# Patient Record
Sex: MALE | Race: BLACK OR AFRICAN AMERICAN | Employment: UNEMPLOYED | URBAN - METROPOLITAN AREA
[De-identification: names, ages, dates, MRNs, and addresses within clinical notes are randomized per-mention and may not be internally consistent; named-entity substitution may affect disease eponyms.]

---

## 2018-05-08 ENCOUNTER — APPOINTMENT (OUTPATIENT)
Dept: GENERAL RADIOLOGY | Age: 68
DRG: 871 | End: 2018-05-08
Attending: INTERNAL MEDICINE
Payer: MEDICARE

## 2018-05-08 ENCOUNTER — ANESTHESIA (OUTPATIENT)
Dept: ICU | Age: 68
DRG: 871 | End: 2018-05-08
Payer: MEDICARE

## 2018-05-08 ENCOUNTER — APPOINTMENT (OUTPATIENT)
Dept: MRI IMAGING | Age: 68
DRG: 871 | End: 2018-05-08
Attending: EMERGENCY MEDICINE
Payer: MEDICARE

## 2018-05-08 ENCOUNTER — APPOINTMENT (OUTPATIENT)
Dept: GENERAL RADIOLOGY | Age: 68
DRG: 871 | End: 2018-05-08
Attending: EMERGENCY MEDICINE
Payer: MEDICARE

## 2018-05-08 ENCOUNTER — HOSPITAL ENCOUNTER (INPATIENT)
Age: 68
LOS: 8 days | Discharge: NURSING, REHAB OR LONG-TERM CARE | DRG: 871 | End: 2018-05-16
Attending: EMERGENCY MEDICINE | Admitting: FAMILY MEDICINE
Payer: MEDICARE

## 2018-05-08 ENCOUNTER — APPOINTMENT (OUTPATIENT)
Dept: CT IMAGING | Age: 68
DRG: 871 | End: 2018-05-08
Attending: EMERGENCY MEDICINE
Payer: MEDICARE

## 2018-05-08 ENCOUNTER — ANESTHESIA EVENT (OUTPATIENT)
Dept: ICU | Age: 68
DRG: 871 | End: 2018-05-08
Payer: MEDICARE

## 2018-05-08 DIAGNOSIS — N17.9 ACUTE RENAL FAILURE, UNSPECIFIED ACUTE RENAL FAILURE TYPE (HCC): ICD-10-CM

## 2018-05-08 DIAGNOSIS — I63.9 ACUTE CVA (CEREBROVASCULAR ACCIDENT) (HCC): Primary | ICD-10-CM

## 2018-05-08 DIAGNOSIS — T68.XXXA HYPOTHERMIA, INITIAL ENCOUNTER: ICD-10-CM

## 2018-05-08 DIAGNOSIS — N19 UREMIA: ICD-10-CM

## 2018-05-08 DIAGNOSIS — J96.01 ACUTE RESPIRATORY FAILURE WITH HYPOXIA (HCC): ICD-10-CM

## 2018-05-08 DIAGNOSIS — E87.1 HYPONATREMIA: ICD-10-CM

## 2018-05-08 PROBLEM — J96.90 RESPIRATORY FAILURE (HCC): Status: ACTIVE | Noted: 2018-05-08

## 2018-05-08 PROBLEM — E87.20 LACTIC ACIDOSIS: Status: ACTIVE | Noted: 2018-05-08

## 2018-05-08 PROBLEM — E86.0 DEHYDRATION: Status: ACTIVE | Noted: 2018-05-08

## 2018-05-08 PROBLEM — G93.40 ENCEPHALOPATHY: Status: ACTIVE | Noted: 2018-05-08

## 2018-05-08 PROBLEM — E87.0 HYPERNATREMIA: Status: ACTIVE | Noted: 2018-05-08

## 2018-05-08 LAB
ABO + RH BLD: NORMAL
ALBUMIN SERPL-MCNC: 1.5 G/DL (ref 3.4–5)
ALBUMIN/GLOB SERPL: 0.3 {RATIO} (ref 0.8–1.7)
ALP SERPL-CCNC: 104 U/L (ref 45–117)
ALT SERPL-CCNC: 42 U/L (ref 16–61)
AMPHET UR QL SCN: NEGATIVE
ANION GAP BLD CALC-SCNC: 18 MMOL/L (ref 10–20)
ANION GAP SERPL CALC-SCNC: 7 MMOL/L (ref 3–18)
APPEARANCE UR: CLEAR
ARTERIAL PATENCY WRIST A: ABNORMAL
ASPIRIN TEST, ASPIRN: 415 ARU (ref 620–672)
AST SERPL-CCNC: 72 U/L (ref 15–37)
BACTERIA URNS QL MICRO: NEGATIVE /HPF
BARBITURATES UR QL SCN: NEGATIVE
BASE DEFICIT BLD-SCNC: 3 MMOL/L
BDY SITE: ABNORMAL
BENZODIAZ UR QL: NEGATIVE
BILIRUB SERPL-MCNC: 0.6 MG/DL (ref 0.2–1)
BILIRUB UR QL: ABNORMAL
BLOOD GROUP ANTIBODIES SERPL: NORMAL
BODY TEMPERATURE: 94.7
BUN BLD-MCNC: 101 MG/DL (ref 7–18)
BUN SERPL-MCNC: 105 MG/DL (ref 7–18)
BUN/CREAT SERPL: 29 (ref 12–20)
CA-I BLD-MCNC: 1.2 MMOL/L (ref 1.12–1.32)
CALCIUM SERPL-MCNC: 8.5 MG/DL (ref 8.5–10.1)
CANNABINOIDS UR QL SCN: NEGATIVE
CHLORIDE BLD-SCNC: 121 MMOL/L (ref 100–108)
CHLORIDE SERPL-SCNC: 125 MMOL/L (ref 100–108)
CO2 BLD-SCNC: 25 MMOL/L (ref 19–24)
CO2 SERPL-SCNC: 26 MMOL/L (ref 21–32)
COCAINE UR QL SCN: NEGATIVE
COLOR UR: ABNORMAL
CREAT SERPL-MCNC: 3.56 MG/DL (ref 0.6–1.3)
CREAT UR-MCNC: 3.3 MG/DL (ref 0.6–1.3)
EPITH CASTS URNS QL MICRO: NORMAL /LPF (ref 0–5)
ERYTHROCYTE [DISTWIDTH] IN BLOOD BY AUTOMATED COUNT: 15.3 % (ref 11.6–14.5)
FIBRINOGEN PPP-MCNC: 441 MG/DL (ref 210–451)
GAS FLOW.O2 O2 DELIVERY SYS: ABNORMAL L/MIN
GAS FLOW.O2 SETTING OXYMISER: 15 BPM
GLOBULIN SER CALC-MCNC: 5.3 G/DL (ref 2–4)
GLUCOSE BLD STRIP.AUTO-MCNC: 180 MG/DL (ref 74–106)
GLUCOSE SERPL-MCNC: 177 MG/DL (ref 74–99)
GLUCOSE UR STRIP.AUTO-MCNC: NEGATIVE MG/DL
HCO3 BLD-SCNC: 22.5 MMOL/L (ref 22–26)
HCT VFR BLD AUTO: 37 % (ref 36–48)
HCT VFR BLD CALC: 39 % (ref 36–49)
HDSCOM,HDSCOM: NORMAL
HGB BLD-MCNC: 11.7 G/DL (ref 13–16)
HGB BLD-MCNC: 13.3 G/DL (ref 12–16)
HGB UR QL STRIP: NEGATIVE
INR PPP: 1.2 (ref 0.8–1.2)
INSPIRATION.DURATION SETTING TIME VENT: 0.9 SEC
KETONES UR QL STRIP.AUTO: NEGATIVE MG/DL
LACTATE BLD-SCNC: 3.1 MMOL/L (ref 0.4–2)
LEUKOCYTE ESTERASE UR QL STRIP.AUTO: ABNORMAL
MCH RBC QN AUTO: 28.5 PG (ref 24–34)
MCHC RBC AUTO-ENTMCNC: 31.6 G/DL (ref 31–37)
MCV RBC AUTO: 90.2 FL (ref 74–97)
METHADONE UR QL: NEGATIVE
NITRITE UR QL STRIP.AUTO: NEGATIVE
O2/TOTAL GAS SETTING VFR VENT: 0.5 %
OPIATES UR QL: NEGATIVE
PCO2 BLD: 35.2 MMHG (ref 35–45)
PCP UR QL: NEGATIVE
PEEP RESPIRATORY: 7 CMH2O
PH BLD: 7.41 [PH] (ref 7.35–7.45)
PH UR STRIP: 5 [PH] (ref 5–8)
PIP ISTAT,IPIP: 21
PLATELET # BLD AUTO: 133 K/UL (ref 135–420)
PMV BLD AUTO: 11.1 FL (ref 9.2–11.8)
PO2 BLD: 112 MMHG (ref 80–100)
POTASSIUM BLD-SCNC: 4.7 MMOL/L (ref 3.5–5.5)
POTASSIUM SERPL-SCNC: 4.7 MMOL/L (ref 3.5–5.5)
PROT SERPL-MCNC: 6.8 G/DL (ref 6.4–8.2)
PROT UR STRIP-MCNC: 30 MG/DL
PROTHROMBIN TIME: 14.7 SEC (ref 11.5–15.2)
RBC # BLD AUTO: 4.1 M/UL (ref 4.7–5.5)
RBC #/AREA URNS HPF: 0 /HPF (ref 0–5)
SAO2 % BLD: 99 % (ref 92–97)
SERVICE CMNT-IMP: ABNORMAL
SODIUM BLD-SCNC: 159 MMOL/L (ref 136–145)
SODIUM SERPL-SCNC: 158 MMOL/L (ref 136–145)
SP GR UR REFRACTOMETRY: 1.02 (ref 1–1.03)
SPECIMEN EXP DATE BLD: NORMAL
SPECIMEN TYPE: ABNORMAL
THROMBIN TIME: 18.5 SECS (ref 13.8–18.2)
TOTAL RESP. RATE, ITRR: 15
UROBILINOGEN UR QL STRIP.AUTO: 1 EU/DL (ref 0.2–1)
VENTILATION MODE VENT: ABNORMAL
VOLUME CONTROL PLUS IVLCP: YES
VT SETTING VENT: 450 ML
WBC # BLD AUTO: 12.7 K/UL (ref 4.6–13.2)
WBC URNS QL MICRO: NORMAL /HPF (ref 0–4)

## 2018-05-08 PROCEDURE — 74011250637 HC RX REV CODE- 250/637: Performed by: EMERGENCY MEDICINE

## 2018-05-08 PROCEDURE — 05HN33Z INSERTION OF INFUSION DEVICE INTO LEFT INTERNAL JUGULAR VEIN, PERCUTANEOUS APPROACH: ICD-10-PCS | Performed by: FAMILY MEDICINE

## 2018-05-08 PROCEDURE — 83605 ASSAY OF LACTIC ACID: CPT

## 2018-05-08 PROCEDURE — 74011000258 HC RX REV CODE- 258: Performed by: FAMILY MEDICINE

## 2018-05-08 PROCEDURE — 80053 COMPREHEN METABOLIC PANEL: CPT | Performed by: EMERGENCY MEDICINE

## 2018-05-08 PROCEDURE — 80047 BASIC METABLC PNL IONIZED CA: CPT

## 2018-05-08 PROCEDURE — 81001 URINALYSIS AUTO W/SCOPE: CPT | Performed by: EMERGENCY MEDICINE

## 2018-05-08 PROCEDURE — 85027 COMPLETE CBC AUTOMATED: CPT | Performed by: EMERGENCY MEDICINE

## 2018-05-08 PROCEDURE — 85384 FIBRINOGEN ACTIVITY: CPT | Performed by: EMERGENCY MEDICINE

## 2018-05-08 PROCEDURE — 74011250636 HC RX REV CODE- 250/636: Performed by: INTERNAL MEDICINE

## 2018-05-08 PROCEDURE — 36415 COLL VENOUS BLD VENIPUNCTURE: CPT | Performed by: FAMILY MEDICINE

## 2018-05-08 PROCEDURE — 87077 CULTURE AEROBIC IDENTIFY: CPT | Performed by: EMERGENCY MEDICINE

## 2018-05-08 PROCEDURE — 77030008683 HC TU ET CUF COVD -A

## 2018-05-08 PROCEDURE — 94002 VENT MGMT INPAT INIT DAY: CPT

## 2018-05-08 PROCEDURE — 5A1945Z RESPIRATORY VENTILATION, 24-96 CONSECUTIVE HOURS: ICD-10-PCS | Performed by: ANESTHESIOLOGY

## 2018-05-08 PROCEDURE — 70450 CT HEAD/BRAIN W/O DYE: CPT

## 2018-05-08 PROCEDURE — 0BH17EZ INSERTION OF ENDOTRACHEAL AIRWAY INTO TRACHEA, VIA NATURAL OR ARTIFICIAL OPENING: ICD-10-PCS | Performed by: ANESTHESIOLOGY

## 2018-05-08 PROCEDURE — 74011250636 HC RX REV CODE- 250/636: Performed by: FAMILY MEDICINE

## 2018-05-08 PROCEDURE — 31500 INSERT EMERGENCY AIRWAY: CPT

## 2018-05-08 PROCEDURE — 51702 INSERT TEMP BLADDER CATH: CPT

## 2018-05-08 PROCEDURE — 82803 BLOOD GASES ANY COMBINATION: CPT

## 2018-05-08 PROCEDURE — 65610000006 HC RM INTENSIVE CARE

## 2018-05-08 PROCEDURE — 82550 ASSAY OF CK (CPK): CPT | Performed by: INTERNAL MEDICINE

## 2018-05-08 PROCEDURE — 99285 EMERGENCY DEPT VISIT HI MDM: CPT

## 2018-05-08 PROCEDURE — 96368 THER/DIAG CONCURRENT INF: CPT

## 2018-05-08 PROCEDURE — 96365 THER/PROPH/DIAG IV INF INIT: CPT

## 2018-05-08 PROCEDURE — 77030013079 HC BLNKT BAIR HGGR 3M -A

## 2018-05-08 PROCEDURE — 71045 X-RAY EXAM CHEST 1 VIEW: CPT

## 2018-05-08 PROCEDURE — 74011000250 HC RX REV CODE- 250: Performed by: INTERNAL MEDICINE

## 2018-05-08 PROCEDURE — C1751 CATH, INF, PER/CENT/MIDLINE: HCPCS | Performed by: ANESTHESIOLOGY

## 2018-05-08 PROCEDURE — 84443 ASSAY THYROID STIM HORMONE: CPT | Performed by: FAMILY MEDICINE

## 2018-05-08 PROCEDURE — 85610 PROTHROMBIN TIME: CPT | Performed by: EMERGENCY MEDICINE

## 2018-05-08 PROCEDURE — 94762 N-INVAS EAR/PLS OXIMTRY CONT: CPT

## 2018-05-08 PROCEDURE — 93005 ELECTROCARDIOGRAM TRACING: CPT

## 2018-05-08 PROCEDURE — 65610000009 HC RM ICU NEURO

## 2018-05-08 PROCEDURE — 36600 WITHDRAWAL OF ARTERIAL BLOOD: CPT

## 2018-05-08 PROCEDURE — 87040 BLOOD CULTURE FOR BACTERIA: CPT | Performed by: EMERGENCY MEDICINE

## 2018-05-08 PROCEDURE — 84439 ASSAY OF FREE THYROXINE: CPT | Performed by: FAMILY MEDICINE

## 2018-05-08 PROCEDURE — 85576 BLOOD PLATELET AGGREGATION: CPT | Performed by: EMERGENCY MEDICINE

## 2018-05-08 PROCEDURE — 80307 DRUG TEST PRSMV CHEM ANLYZR: CPT | Performed by: EMERGENCY MEDICINE

## 2018-05-08 PROCEDURE — 86900 BLOOD TYPING SEROLOGIC ABO: CPT | Performed by: EMERGENCY MEDICINE

## 2018-05-08 PROCEDURE — 83605 ASSAY OF LACTIC ACID: CPT | Performed by: FAMILY MEDICINE

## 2018-05-08 PROCEDURE — 85670 THROMBIN TIME PLASMA: CPT | Performed by: EMERGENCY MEDICINE

## 2018-05-08 PROCEDURE — 87186 SC STD MICRODIL/AGAR DIL: CPT | Performed by: EMERGENCY MEDICINE

## 2018-05-08 PROCEDURE — 74011250636 HC RX REV CODE- 250/636: Performed by: EMERGENCY MEDICINE

## 2018-05-08 PROCEDURE — 83735 ASSAY OF MAGNESIUM: CPT | Performed by: FAMILY MEDICINE

## 2018-05-08 PROCEDURE — 74011000258 HC RX REV CODE- 258: Performed by: INTERNAL MEDICINE

## 2018-05-08 PROCEDURE — 74011000258 HC RX REV CODE- 258: Performed by: EMERGENCY MEDICINE

## 2018-05-08 PROCEDURE — 77030005534 HC CATH URETH FOL TEMP SENS SIMS -B

## 2018-05-08 RX ORDER — FENTANYL CITRATE 50 UG/ML
50 INJECTION, SOLUTION INTRAMUSCULAR; INTRAVENOUS
Status: DISCONTINUED | OUTPATIENT
Start: 2018-05-08 | End: 2018-05-16 | Stop reason: HOSPADM

## 2018-05-08 RX ORDER — DEXTROSE MONOHYDRATE AND SODIUM CHLORIDE 5; .45 G/100ML; G/100ML
125 INJECTION, SOLUTION INTRAVENOUS CONTINUOUS
Status: DISCONTINUED | OUTPATIENT
Start: 2018-05-08 | End: 2018-05-09

## 2018-05-08 RX ORDER — HYDROCORTISONE SODIUM SUCCINATE 100 MG/2ML
50 INJECTION, POWDER, FOR SOLUTION INTRAMUSCULAR; INTRAVENOUS EVERY 6 HOURS
Status: DISCONTINUED | OUTPATIENT
Start: 2018-05-08 | End: 2018-05-10

## 2018-05-08 RX ORDER — ASPIRIN 300 MG/1
300 SUPPOSITORY RECTAL DAILY
Status: DISCONTINUED | OUTPATIENT
Start: 2018-05-09 | End: 2018-05-11

## 2018-05-08 RX ORDER — SODIUM CHLORIDE 9 MG/ML
125 INJECTION, SOLUTION INTRAVENOUS CONTINUOUS
Status: DISCONTINUED | OUTPATIENT
Start: 2018-05-08 | End: 2018-05-08

## 2018-05-08 RX ORDER — SODIUM CHLORIDE 9 MG/ML
1000 INJECTION, SOLUTION INTRAVENOUS ONCE
Status: COMPLETED | OUTPATIENT
Start: 2018-05-08 | End: 2018-05-08

## 2018-05-08 RX ORDER — NOREPINEPHRINE BIT/0.9 % NACL 8 MG/250ML
2-16 INFUSION BOTTLE (ML) INTRAVENOUS
Status: DISCONTINUED | OUTPATIENT
Start: 2018-05-08 | End: 2018-05-08

## 2018-05-08 RX ORDER — MIDAZOLAM HYDROCHLORIDE 1 MG/ML
1 INJECTION, SOLUTION INTRAMUSCULAR; INTRAVENOUS
Status: DISCONTINUED | OUTPATIENT
Start: 2018-05-08 | End: 2018-05-13

## 2018-05-08 RX ORDER — SODIUM CHLORIDE 9 MG/ML
1000 INJECTION, SOLUTION INTRAVENOUS ONCE
Status: COMPLETED | OUTPATIENT
Start: 2018-05-08 | End: 2018-05-12

## 2018-05-08 RX ORDER — NOREPINEPHRINE BIT/0.9 % NACL 8 MG/250ML
2-30 INFUSION BOTTLE (ML) INTRAVENOUS
Status: DISCONTINUED | OUTPATIENT
Start: 2018-05-08 | End: 2018-05-10

## 2018-05-08 RX ORDER — SODIUM CHLORIDE 0.9 % (FLUSH) 0.9 %
5-10 SYRINGE (ML) INJECTION AS NEEDED
Status: DISCONTINUED | OUTPATIENT
Start: 2018-05-08 | End: 2018-05-16 | Stop reason: HOSPADM

## 2018-05-08 RX ORDER — HEPARIN SODIUM 5000 [USP'U]/ML
5000 INJECTION, SOLUTION INTRAVENOUS; SUBCUTANEOUS EVERY 8 HOURS
Status: DISCONTINUED | OUTPATIENT
Start: 2018-05-08 | End: 2018-05-14

## 2018-05-08 RX ORDER — ASPIRIN 300 MG/1
300 SUPPOSITORY RECTAL
Status: COMPLETED | OUTPATIENT
Start: 2018-05-08 | End: 2018-05-08

## 2018-05-08 RX ORDER — SODIUM CHLORIDE 0.9 % (FLUSH) 0.9 %
5-10 SYRINGE (ML) INJECTION EVERY 8 HOURS
Status: DISCONTINUED | OUTPATIENT
Start: 2018-05-08 | End: 2018-05-16 | Stop reason: HOSPADM

## 2018-05-08 RX ORDER — NOREPINEPHRINE BIT/0.9 % NACL 8 MG/250ML
INFUSION BOTTLE (ML) INTRAVENOUS
Status: DISPENSED
Start: 2018-05-08 | End: 2018-05-09

## 2018-05-08 RX ORDER — LEVOFLOXACIN 5 MG/ML
750 INJECTION, SOLUTION INTRAVENOUS
Status: DISCONTINUED | OUTPATIENT
Start: 2018-05-08 | End: 2018-05-10

## 2018-05-08 RX ORDER — VASOPRESSIN 20 U/ML
INJECTION PARENTERAL
Status: DISPENSED
Start: 2018-05-08 | End: 2018-05-09

## 2018-05-08 RX ORDER — PHENYLEPHRINE HCL IN 0.9% NACL 30MG/250ML
10-300 PLASTIC BAG, INJECTION (ML) INTRAVENOUS
Status: DISCONTINUED | OUTPATIENT
Start: 2018-05-08 | End: 2018-05-10

## 2018-05-08 RX ADMIN — SODIUM CHLORIDE 1000 ML: 900 INJECTION, SOLUTION INTRAVENOUS at 19:11

## 2018-05-08 RX ADMIN — NOREPINEPHRINE BITARTRATE 30 MCG/MIN: 1 INJECTION INTRAVENOUS at 23:34

## 2018-05-08 RX ADMIN — HEPARIN SODIUM 5000 UNITS: 5000 INJECTION, SOLUTION INTRAVENOUS; SUBCUTANEOUS at 21:07

## 2018-05-08 RX ADMIN — Medication 10 ML: at 21:01

## 2018-05-08 RX ADMIN — DEXTROSE MONOHYDRATE AND SODIUM CHLORIDE 125 ML/HR: 5; .45 INJECTION, SOLUTION INTRAVENOUS at 21:01

## 2018-05-08 RX ADMIN — LEVOFLOXACIN 750 MG: 5 INJECTION, SOLUTION INTRAVENOUS at 15:47

## 2018-05-08 RX ADMIN — ASPIRIN 300 MG: 300 SUPPOSITORY RECTAL at 15:33

## 2018-05-08 RX ADMIN — SODIUM CHLORIDE 1000 ML: 900 INJECTION, SOLUTION INTRAVENOUS at 23:20

## 2018-05-08 RX ADMIN — NOREPINEPHRINE BITARTRATE 30 MCG/MIN: 1 INJECTION INTRAVENOUS at 23:23

## 2018-05-08 RX ADMIN — Medication 30 MCG/MIN: at 19:48

## 2018-05-08 RX ADMIN — HYDROCORTISONE SODIUM SUCCINATE 50 MG: 100 INJECTION, POWDER, FOR SOLUTION INTRAMUSCULAR; INTRAVENOUS at 23:19

## 2018-05-08 RX ADMIN — NOREPINEPHRINE BITARTRATE 30 MCG/MIN: 1 INJECTION INTRAVENOUS at 23:50

## 2018-05-08 RX ADMIN — PIPERACILLIN SODIUM,TAZOBACTAM SODIUM 3.38 G: 3; .375 INJECTION, POWDER, FOR SOLUTION INTRAVENOUS at 21:08

## 2018-05-08 RX ADMIN — SODIUM CHLORIDE 1000 MG: 900 INJECTION, SOLUTION INTRAVENOUS at 15:46

## 2018-05-08 RX ADMIN — FAMOTIDINE 20 MG: 10 INJECTION INTRAVENOUS at 21:07

## 2018-05-08 RX ADMIN — SODIUM CHLORIDE 1000 ML: 900 INJECTION, SOLUTION INTRAVENOUS at 14:54

## 2018-05-08 RX ADMIN — SODIUM CHLORIDE 2178 ML: 900 INJECTION, SOLUTION INTRAVENOUS at 15:15

## 2018-05-08 RX ADMIN — SODIUM CHLORIDE 750 MG: 900 INJECTION, SOLUTION INTRAVENOUS at 17:38

## 2018-05-08 RX ADMIN — VASOPRESSIN 0.04 UNITS/MIN: 20 INJECTION INTRAVENOUS at 20:37

## 2018-05-08 RX ADMIN — PIPERACILLIN SODIUM,TAZOBACTAM SODIUM 3.38 G: 3; .375 INJECTION, POWDER, FOR SOLUTION INTRAVENOUS at 15:36

## 2018-05-08 NOTE — ED NOTES
Initial delay in transfer related to needing family member for MRI screening. After finding family member, patient was noted to have low systolic BP. Awaited return call per hospitalist for orders. Bolus administered. Patient has been positively responsive to fluids. Will transfer to MRI then to ICU after that. Patient remains intubated with no new issues noted. Will continue to monitor.

## 2018-05-08 NOTE — IP AVS SNAPSHOT
[address] [address] 
[phone] Patient: Eli Tan MRN: [id number] TMD:0/8/5631 About your hospitalization You were admitted on: May 8, 2018 You last received care in the:  [address] NEURO [facility] You were discharged on:  May 16, 2018 Why you were hospitalized Your primary diagnosis was:  Encephalopathy Your diagnoses also included:  Respiratory Failure (Hcc), Dehydration, Lactic Acidosis, Hypernatremia, Rohan (Acute Kidney Injury) (Hcc), Cva (Cerebral Vascular Accident) (Hcc), Sepsis (Hcc), Elevated Troponin, Postprocedural Pneumothorax, Hyperglycemia, Elevated Ck, Bacteremia, Advance Care Planning, Altered Mental Status, Peg (Percutaneous Endoscopic Gastrostomy) Status (Hcc), Debility, Incontinence Of Feces, Malnutrition (Hcc), Dependent For Personal Hygiene, Palliative Care Encounter Follow-up Information Follow up With Details Comments Contact Info Chacho Mayorgamyriam ([facility])  on arrival  [address] [facility] [phone] [id number] [phone] Rosa Ernst MD In 1 week Reveiw recent hospitalization [phone] Discharge Orders None A check familia indicates which time of day the medication should be taken. My Medications START taking these medications Instructions Each Dose to Equal  
 Morning Noon Evening Bedtime  
 amLODIPine 10 mg tablet Commonly known as:  Caralee Dupes Start taking on:  5/17/2018 Your last dose was: Your next dose is: Take 1 Tab by mouth daily. 10 mg  
    
   
   
   
  
 apixaban 5 mg tablet Commonly known as:  Shreya Foster Your last dose was: Your next dose is: Take 10 mg BID x 7 days then 5 mg BID  
     
   
   
   
  
 aspirin 325 mg tablet Commonly known as:  ASPIRIN Start taking on:  5/17/2018 Your last dose was: Your next dose is: Take 1 Tab by mouth daily. 325 mg  
    
   
   
   
  
 collagenase 250 unit/gram ointment Commonly known as:  SANTYL Start taking on:  5/17/2018 Your last dose was: Your next dose is:    
   
   
 Apply  to affected area daily. predniSONE 20 mg tablet Commonly known as:  Emely Burgos Your last dose was: Your next dose is: Take 2 Tabs by mouth daily (with breakfast) for 5 days. 40 mg Where to Get Your Medications Information on where to get these meds will be given to you by the nurse or doctor. ! Ask your nurse or doctor about these medications  
  amLODIPine 10 mg tablet  
 apixaban 5 mg tablet  
 aspirin 325 mg tablet  
 collagenase 250 unit/gram ointment  
 predniSONE 20 mg tablet Discharge Instructions DISCHARGE SUMMARY from Nurse PATIENT INSTRUCTIONS: 
 
 
F-face looks uneven A-arms unable to move or move unevenly S-speech slurred or non-existent T-time-call 911 as soon as signs and symptoms begin-DO NOT go Back to bed or wait to see if you get better-TIME IS BRAIN. Warning Signs of HEART ATTACK Call 911 if you have these symptoms: 
? Chest discomfort. Most heart attacks involve discomfort in the center of the chest that lasts more than a few minutes, or that goes away and comes back. It can feel like uncomfortable pressure, squeezing, fullness, or pain. ? Discomfort in other areas of the upper body. Symptoms can include pain or discomfort in one or both arms, the back, neck, jaw, or stomach. ? Shortness of breath with or without chest discomfort. ? Other signs may include breaking out in a cold sweat, nausea, or lightheadedness. Don't wait more than five minutes to call [address]! Fast action can save your life. Calling 911 is almost always the fastest way to get lifesaving treatment. Emergency Medical Services staff can begin treatment when they arrive  up to an hour sooner than if someone gets to the hospital by car. The discharge information has been reviewed with the patient. The patient verbalize understanding but no signs of learning. Discharge medications reviewed with the patient and appropriate educational materials and side effects teaching were provided. Patient armband removed and shredded Altered Mental Status: Care Instructions Your Care Instructions Altered mental status is a change in how well your brain is working. As a result, you may be confused, be less alert than usual, or act in odd ways. This may include seeing or hearing things that aren't really there (hallucinations). A mental status change has many possible causes. For example, it may be the result of an infection, an imbalance of chemicals in the body, or a chronic disease such as diabetes or COPD. It can also be caused by things such as a head injury, taking certain medicines, or using alcohol or drugs. The doctor may do tests to look for the cause. These tests may include urine tests, blood tests, and imaging tests such as a CT scan. Sometimes a clear cause isn't found. But tests can help the doctor rule out a serious cause of your symptoms. A change in mental status can be scary. But mental status will often return to normal when the cause is treated. So it is important to get any follow-up testing or treatment the doctor has suggested. The doctor has checked you carefully, but problems can develop later. If you notice any problems or new symptoms, get medical treatment right away. Follow-up care is a key part of your treatment and safety. Be sure to make and go to all appointments, and call your doctor if you are having problems. It's also a good idea to know your test results and keep a list of the medicines you take. How can you care for yourself at home? · Be safe with medicines. Take your medicines exactly as prescribed. Call your doctor if you think you are having a problem with your medicine. · Have another adult stay with you until you are better. This can help keep you safe. Ask that person to watch for signs that your mental status is getting worse. When should you call for help? Call 911 anytime you think you may need emergency care. For example, call if: 
· You passed out (lost consciousness). Call your doctor now or seek immediate medical care if: 
· Your mental status is getting worse. · You have new symptoms, such as a fever, chills, or shortness of breath. · You do not feel safe. Watch closely for changes in your health, and be sure to contact your doctor if: 
· You do not get better as expected. Where can you learn more? Go to [employer].be Enter P165 in the search box to learn more about \"Altered Mental Status: Care Instructions. \"  
© 0699-0207 Healthwise, Incorporated. Care instructions adapted under license by [facility] [employer] [facility] (which disclaims liability or warranty for this information). This care instruction is for use with your licensed healthcare professional. If you have questions about a medical condition or this instruction, always ask your healthcare professional. [address] [place] [address] any warranty or liability for your use of this information. Content Version: 58.3.686034; Current as of: November 20, 2015 Dehydration: Care Instructions Your Care Instructions Dehydration happens when your body loses too much fluid.  This might happen when you do not drink enough water or you lose large amounts of fluids from your body because of diarrhea, vomiting, or sweating. Severe dehydration can be life-threatening. Water and minerals called electrolytes help put your body fluids back in balance. Learn the early signs of fluid loss, and drink more fluids to prevent dehydration. Follow-up care is a key part of your treatment and safety. Be sure to make and go to all appointments, and call your doctor if you are having problems. It's also a good idea to know your test results and keep a list of the medicines you take. How can you care for yourself at home? · To prevent dehydration, drink plenty of fluids, enough so that your urine is light yellow or clear like water. Choose water and other caffeine-free clear liquids until you feel better. If you have kidney, heart, or liver disease and have to limit fluids, talk with your doctor before you increase the amount of fluids you drink. · If you do not feel like eating or drinking, try taking small sips of water, sports drinks, or other rehydration drinks. · Get plenty of rest. 
To prevent dehydration · Add more fluids to your diet and daily routine, unless your doctor has told you not to. · During hot weather, drink more fluids. Drink even more fluids if you exercise a lot. Stay away from drinks with alcohol or caffeine. · Watch for the symptoms of dehydration. These include: ¨ A dry, sticky mouth. ¨ Dark yellow urine, and not much of it. ¨ Dry and sunken eyes. ¨ Feeling very tired. · Learn what problems can lead to dehydration. These include: ¨ Diarrhea, fever, and vomiting. ¨ Any illness with a fever, such as pneumonia or the flu. ¨ Activities that cause heavy sweating, such as endurance races and heavy outdoor work in hot or humid weather. ¨ Alcohol or drug abuse or withdrawal. 
¨ Certain medicines, such as cold and allergy pills (antihistamines), diet pills (diuretics), and laxatives. ¨ Certain diseases, such as diabetes, cancer, and heart or kidney disease. When should you call for help? Call 911 anytime you think you may need emergency care. For example, call if: 
? · You passed out (lost consciousness). ?Call your doctor now or seek immediate medical care if: 
? · You are confused and cannot think clearly. ? · You are dizzy or lightheaded, or you feel like you may faint. ? · You have signs of needing more fluids. You have sunken eyes and a dry mouth, and you pass only a little dark urine. ? · You cannot keep fluids down. ? Watch closely for changes in your health, and be sure to contact your doctor if: 
? · You are not making tears. ? · Your skin is very dry and sags slowly back into place after you pinch it. ? · Your mouth and eyes are very dry. Where can you learn more? Go to http://roxy-paulino.info/. Enter A642 in the search box to learn more about \"Dehydration: Care Instructions. \" Current as of: March 20, 2017 Content Version: 11.4 © 0818-4452 [employer]. Care instructions adapted under license by [facility] (which disclaims liability or warranty for this information). If you have questions about a medical condition or this instruction, always ask your healthcare professional. [address] any warranty or liability for your use of this information. Electrolyte Imbalance: Care Instructions Your Care Instructions Electrolytes are minerals in your blood. They include sodium, potassium, calcium, and magnesium. When they are not at the right levels, you can feel very ill. You may not know what is causing it, but you know something is wrong. You may feel weak or numb, have muscle spasms, or twitch. Your heart may beat fast. Symptoms are different with each mineral. Too much is as bad as too little. Minerals help keep your body working as it should. Vomiting, diarrhea, and fever can cause you to lose minerals.  A problem with your kidneys can tip a mineral out of balance. So can taking certain medicines. Your doctor may do more tests. He or she may change your medicine and diet. If you are low in one or more minerals, they may be given through a tube into your vein (IV). Your doctor may have you take or drink special fluids or pills. If your kidneys are failing, your blood may be filtered. This is called dialysis. It can put the minerals back in balance. Follow-up care is a key part of your treatment and safety. Be sure to make and go to all appointments, and call your doctor if you are having problems. It's also a good idea to know your test results and keep a list of the medicines you take. How can you care for yourself at home? · Take your medicines exactly as prescribed. Call your doctor if you have any problems with your medicines. You will get more details on the specific medicines your doctor prescribes. · Do not take any medicine without talking to your doctor first. This includes prescription, over-the-counter, and herbal medicines. · If you have kidney, heart, or liver disease and have to limit fluids, talk with your doctor before you increase the amount of fluids you drink. · Your doctor or dietitian may give you a diet plan to help balance your minerals. Follow the diet carefully. When should you call for help? Call 911 anytime you think you may need emergency care. For example, call if: 
? · You passed out (lost consciousness). ? · Your heartbeat seems to be irregular. It might be speeding up and then slowing down or skipping beats. ?Call your doctor now or seek immediate medical care if: 
? · You have muscle aches. ? · You feel very weak. ? · You are confused or cannot think clearly. ? Watch closely for changes in your health, and be sure to contact your doctor if: 
? · You do not get better as expected. Where can you learn more? Go to http://roxy-paulino.info/. Enter N201 in the search box to learn more about \"Electrolyte Imbalance: Care Instructions. \" Current as of: May 12, 2017 Content Version: 11.4 © 2439-6096 [employer]. Care instructions adapted under license by [facility] (which disclaims liability or warranty for this information). If you have questions about a medical condition or this instruction, always ask your healthcare professional. [address] any warranty or liability for your use of this information. Hyponatremia: Care Instructions Your Care Instructions Hyponatremia (say \"bf-rx-mgj-TREE-ian-uh\") means that you don't have enough sodium in your blood. It can cause nausea, vomiting, and headaches. Or you may not feel hungry. In serious cases, it can cause seizures, a coma, or even death. Hyponatremia is not a disease. It is a problem caused by something else, such as medicines or exercising for a long time in hot weather. You can get hyponatremia if you lose a lot of fluids and then you drink a lot of water or other liquids that don't have much sodium. You can also get it if you have kidney, liver, heart, or other health problems. Treatment is focused on getting your sodium levels back to normal. 
Follow-up care is a key part of your treatment and safety. Be sure to make and go to all appointments, and call your doctor if you are having problems. It's also a good idea to know your test results and keep a list of the medicines you take. How can you care for yourself at home? · If your doctor recommends it, drink fluids that have sodium. Sports drinks are a good choice. Or you can eat salty foods. · If your doctor recommends it, limit the amount of water you drink. And limit fluids that are mostly water. These include tea, coffee, and juice. · Take your medicines exactly as prescribed. Call your doctor if you have any problems with your medicine. · Get your sodium levels tested when your doctor tells you to. When should you call for help? Call 911 anytime you think you may need emergency care. For example, call if: 
? · You have a seizure. ? · You passed out (lost consciousness). ?Call your doctor now or seek immediate medical care if: 
? · You are confused or it is hard to focus. ? · You have little or no appetite. ? · You feel sick to your stomach or you vomit. ? · You have a headache. ? · You have mood changes. ? · You feel more tired than usual. ? Watch closely for changes in your health, and be sure to contact your doctor if: 
? · You do not get better as expected. Where can you learn more? Go to http://roxy-paulino.info/. Enter F869 in the search box to learn more about \"Hyponatremia: Care Instructions. \" Current as of: October 14, 2016 Content Version: 11.4 © 9874-8555 [employer]. Care instructions adapted under license by [employer] (which disclaims liability or warranty for this information). If you have questions about a medical condition or this instruction, always ask your healthcare professional. [place] [address] any warranty or liability for your use of this information. Learning About an Ischemic Stroke What is an ischemic stroke? An ischemic (say \"bpx-CJW-fodv\") stroke occurs when a blood clot blocks a blood vessel in the brain. This means that blood cannot flow to some part of the brain. Without blood and the oxygen it carries, this part of the brain starts to die. The part of the body controlled by the damaged area of the brain cannot work properly. This is different from a hemorrhagic (say \"msc-umc-QC-jick\") stroke, which happens when a blood vessel in the brain has burst open or has started to leak. The brain damage from a stroke starts within minutes. Quick treatment can help limit damage to the brain and make recovery more likely. People who have had a stroke may have a hard time talking, understanding things, and making decisions. They may have to relearn daily activities, such as how to eat, bathe, and dress. How well someone recovers from a stroke depends on how quickly the person gets to the hospital, where in the brain the stroke happened, and how severe it was. Training and therapy also make a difference in how well people recover. What are the symptoms? If you have any of these symptoms, call 911 or other emergency services right away: 
· You have symptoms of a stroke. These may include: 
¨ Sudden numbness, tingling, weakness, or loss of movement in your face, arm, or leg, especially on only one side of your body. ¨ Sudden vision changes. ¨ Sudden trouble speaking. ¨ Sudden confusion or trouble understanding simple statements. ¨ Sudden problems with walking or balance. ¨ A sudden, severe headache that is different from past headaches. See your doctor if you have symptoms that seem like a stroke, even if they go away quickly. You may have had a transient ischemic attack (TIA), sometimes called a mini-stroke. A TIA is a warning that a stroke may happen soon. Getting early treatment for a TIA can help prevent a stroke. What causes an ischemic stroke? An ischemic stroke is caused by a blood clot that blocks blood flow in the brain. The most common causes of blood clots include: 
· Hardening of the arteries (atherosclerosis). This is caused by high blood pressure, diabetes, high cholesterol, or smoking. · Atrial fibrillation. How is ischemic stroke treated? You may have to take several medicines, depending on what caused your stroke. Ask your doctor if a stroke rehab program is right for you. Rehab increases your chances of getting back some of the abilities you lost. 
How can you prevent another stroke? · Work with your doctor to treat any health problems you have.  High blood pressure, high cholesterol, atrial fibrillation, and diabetes all raise your chances of having a stroke. · Be safe with medicines. Take your medicine exactly as prescribed. Call your doctor if you think you are having a problem with your medicine. · Have a healthy lifestyle. ¨ Do not smoke or allow others to smoke around you. If you need help quitting, talk to your doctor about stop-smoking programs and medicines. These can increase your chances of quitting for good. Smoking makes a stroke more likely. ¨ Limit alcohol to 2 drinks a day for men and 1 drink a day for women. ¨ Lose weight if you need to. A healthy weight will help you keep your heart and body healthy. ¨ Be active. Ask your doctor what type and level of activity is safe for you. ¨ Eat heart-healthy foods, like fruits, vegetables, and high-fiber foods. Follow-up care is a key part of your treatment and safety. Be sure to make and go to all appointments, and call your doctor if you are having problems. It's also a good idea to know your test results and keep a list of the medicines you take. Where can you learn more? Go to http://roxyGlacier Baypaulino.info/. Enter D161 in the search box to learn more about \"Learning About an Ischemic Stroke. \" Current as of: March 20, 2017 Content Version: 11.4 © 5061-1262 Healthwise, [email or web address]. Care instructions adapted under license by [facility] (which disclaims liability or warranty for this information). If you have questions about a medical condition or this instruction, always ask your healthcare professional. [place] [address] any warranty or liability for your use of this information. Acute Kidney Injury: Care Instructions Your Care Instructions Acute kidney injury (SIMIN) is a sudden decrease in kidney function. This can happen over a period of hours, days or, in some cases, weeks.  SIMIN used to be called acute renal failure, but kidney failure doesn't always happen with SIMIN. Common causes of SIMIN are dehydration, blood loss, and medicines. When SIMIN happens, the kidneys have trouble removing waste and excess fluids from the body. The waste and fluids build up and become harmful. Kidney function may return to normal if the cause of SIMIN is treated quickly. Your chance of a full recovery depends on what caused the problem, how quickly the cause was treated, and what other medical problems you have. A machine may be used to help your kidneys remove waste and fluids for a short period of time. This is called dialysis. Follow-up care is a key part of your treatment and safety. Be sure to make and go to all appointments, and call your doctor if you are having problems. It's also a good idea to know your test results and keep a list of the medicines you take. How can you care for yourself at home? · Talk to your doctor about how much fluid you should drink. · Eat a balanced diet. Talk to your doctor or a dietitian about what type of diet may be best for you. You may need to limit sodium, potassium, and phosphorus. · If you need dialysis, follow the instructions and schedule for dialysis that your doctor gives you. · Do not smoke. Smoking can make your condition worse. If you need help quitting, talk to your doctor about stop-smoking programs and medicines. These can increase your chances of quitting for good. · Do not drink alcohol. · Review all of your medicines with your doctor. Do not take any medicines, including nonsteroidal anti-inflammatory drugs (NSAIDs) such as ibuprofen (Advil, Motrin) or naproxen (Aleve), unless your doctor says it is safe for you to do so. · Make sure that anyone treating you for any health problem knows that you have had SIMIN. When should you call for help? Call 911 anytime you think you may need emergency care. For example, call if: ? · You passed out (lost consciousness). ?Call your doctor now or seek immediate medical care if: 
? · You have new or worse nausea and vomiting. ? · You have much less urine than normal, or you have no urine. ? · You are feeling confused or cannot think clearly. ? · You have new or more blood in your urine. ? · You have new swelling. ? · You are dizzy or lightheaded, or feel like you may faint. ? Watch closely for changes in your health, and be sure to contact your doctor if: 
? · You do not get better as expected. Where can you learn more? Go to http://roxy-paulino.info/. Enter M371 in the search box to learn more about \"Acute Kidney Injury: Care Instructions. \" Current as of: May 12, 2017 Content Version: 11.4 © 8718-7714 [employer]. Care instructions adapted under license by [employer] (which disclaims liability or warranty for this information). If you have questions about a medical condition or this instruction, always ask your healthcare professional. [place] [address] any warranty or liability for your use of this information. Learning About Long-Term Care for Stroke What is long-term care for stroke? Long-term care for stroke is care for people who are leaving the hospital after a stroke but who still need some medical care or other help while they work on getting better. Choosing the right type of long-term care is a very personal decision. It's important to look carefully at your options. You want to be sure that the level of care is right and that you will feel comfortable. Your doctor or other members of your medical team can help you find which type of long-term care would be best for you. What are the types of long-term care for stroke patients? Each type of care center offers a different level of care. These centers may have shared or private rooms. An assisted living center or residential care center: · Has a range of services. These may include meals, cleaning, and laundry. And they may offer help with personal needs like bathing, grooming, and dressing. · Often includes oversight by a nurse. You may be able to get help with basic care, such as getting medicines. A skilled nursing center: · Offers nursing care up to 24 hours a day. · Provides meals and laundry. · Provides help with dressing, bathing, using the toilet, and other daily tasks. · Offers rehab therapy. A long-term acute care hospital: · Is for stroke patients who have special medical problems. This may include things like chronic pain or not being able to breathe on your own. Follow-up care is a key part of your treatment and safety. Be sure to make and go to all appointments, and call your doctor if you are having problems. It's also a good idea to know your test results and keep a list of the medicines you take. Where can you learn more? Go to http://roxy-paulino.info/. Enter X804 in the search box to learn more about \"Learning About Long-Term Care for Stroke. \" Current as of: March 20, 2017 Content Version: 11.4 © 0436-0293 Healthwise, [employer]. Care instructions adapted under license by [employer] (which disclaims liability or warranty for this information). If you have questions about a medical condition or this instruction, always ask your healthcare professional. [address] any warranty or liability for your use of this information. ___________________________________________________________________________________________________________________________________ MyChart Announcement We are excited to announce that we are making your provider's discharge notes available to you in Neventumhart.   You will see these notes when they are completed and signed by the physician that discharged you from your recent hospital stay. If you have any questions or concerns about any information you see in VetCompare, please call the Health Information Department where you were seen or reach out to your Primary Care Provider for more information about your plan of care. Introducing [facility]! [employer] introduces VetCompare patient portal. Now you can access parts of your medical record, email your doctor's office, and request medication refills online. 1. In your internet browser, go to [email or web address] [email or web address] Software 2. Click on the First Time User? Click Here link in the Sign In box. You will see the New Member Sign Up page. 3. Enter your VetCompare Access Code exactly as it appears below. You will not need to use this code after youve completed the sign-up process. If you do not sign up before the expiration date, you must request a new code. · VetCompare Access Code: [email or web address] Expires: 8/6/2018  2:11 PM 
 
4. Enter the last four digits of your Social Security Number (xxxx) and Date of Birth (mm/dd/yyyy) as indicated and click Submit. You will be taken to the next sign-up page. 5. Create a VetCompare ID. This will be your VetCompare login ID and cannot be changed, so think of one that is secure and easy to remember. 6. Create a VetCompare password. You can change your password at any time. 7. Enter your Password Reset Question and Answer. This can be used at a later time if you forget your password. 8. Enter your e-mail address. You will receive e-mail notification when new information is available in [address] 9. Click Sign Up. You can now view and download portions of your medical record. 10. Click the Download Summary menu link to download a portable copy of your medical information. If you have questions, please visit the Frequently Asked Questions section of the VetCompare website. Remember, VetCompare is NOT to be used for urgent needs. For medical emergencies, dial 911. Now available from your iPhone and Android! Introducing Hilton Joshi As a Rumson Lesschanel patient, I wanted to make you aware of our electronic visit tool called Hilton Joshi. Rodney Joe 24/7 allows you to connect within minutes with a medical provider 24 hours a day, seven days a week via a mobile device or tablet or logging into a secure website from your computer. You can access Hilton Joshi from anywhere in the [place]. A virtual visit might be right for you when you have a simple condition and feel like you just dont want to get out of bed, or cant get away from work for an appointment, when your regular Rodney Lessen provider is not available (evenings, weekends or holidays), or when youre out of town and need minor care. Electronic visits cost only $49 and if the Rodneygayle Joe 24/7 provider determines a prescription is needed to treat your condition, one can be electronically transmitted to a nearby pharmacy*. Please take a moment to enroll today if you have not already done so. The enrollment process is free and takes just a few minutes. To enroll, please download the Rodney Leevia 24/7 vanessa to your tablet or phone, or visit [email or web address] to enroll on your computer. And, as an [address] [place] [address] patient with a CultureMap account, the results of your visits will be scanned into your electronic medical record and your primary care provider will be able to view the scanned results. We urge you to continue to see your regular Rumson Lessen provider for your ongoing medical care. And while your primary care provider may not be the one available when you seek a Hilton Joshi virtual visit, the peace of mind you get from getting a real diagnosis real time can be priceless. For more information on Hilton Joshi, view our Frequently Asked Questions (FAQs) at [email or web address]. Sincerely, 
 
Atiya Lewis MD 
Chief Medical Officer Vanessa Rivas *:  certain medications cannot be prescribed via Hilton Joshi Unresulted Labs-Please follow up with your PCP about these lab tests Order Current Status CULTURE, BLOOD Preliminary result CULTURE, BLOOD Preliminary result Providers Seen During Your Hospitalization Provider Specialty Primary office phone Mitch Bella MD Emergency Medicine [phone] Mihaela Martinez MD Family Practice [phone] Emir Riley, [address] [place] [address] Internal Medicine [phone] Valentino Soles, DO Internal Medicine [phone] Your Primary Care Physician (PCP) Primary Care Physician Office Phone Office Fax Marly Sotelo [phone] You are allergic to the following No active allergies Recent Documentation Height Weight BMI  
  
  
 1.829 m 79.4 kg 23.73 kg/m2 Emergency Contacts Name Discharge Info Relation Home Work Mobile [facility] DISCHARGE CAREGIVER [3] Child [2]   [phone] Chava Primer CAREGIVER [3] Sister [23]   [phone] Kevin Alexandre     [phone] Vergil Bur     [phone] Patient Belongings The following personal items are in your possession at time of discharge: 
                             
 
  
  
 Please provide this summary of care documentation to your next provider. Signatures-by signing, you are acknowledging that this After Visit Summary has been reviewed with you and you have received a copy. Patient Signature:  ____________________________________________________________ Date:  ____________________________________________________________  
  
Larri Hazard Provider Signature:  ____________________________________________________________ Date:  ____________________________________________________________

## 2018-05-08 NOTE — PROGRESS NOTES
responded to Code Stemi Call for patient. Provided support for family. See Flow Sheet for to the inverventions.  Chaplains will continue to follow and will provide pastoral careChaplain  on an as needed/requested basis    [address] [place] [address]   Staff [address] [place] [address]   [phone]

## 2018-05-08 NOTE — ROUTINE PROCESS
1950: Assisting Dr. Hugo Frost with Emergent CVL placement. 2300: Spoke with Dr. Alicia Sharma Radiology Resident. Stated there is a 4cm Left lung base pneumothorax. Patient is asymptomatic. Notified Dr. Hugo Frost. 2350: Patient transferred to [address] [place] [address]. Report given to Nicol Hoffman RN. Family at bedside, updated, 4 digit privacy code provided. Bedside shift change report given to Nicol Hoffman RN (oncoming nurse) by [facility], RN (offgoing nurse). Report included the following information SBAR, Kardex, ED Summary, Procedure Summary, Intake/Output, MAR, Accordion, Recent Results and Cardiac Rhythm NSR.

## 2018-05-08 NOTE — PROGRESS NOTES
Patient came in and intubated by EMS. Pt taken to CT then placed on vent AC/VC+ 15/450/7/100% and is tolerating well. Will continue to monitor. 1600 called to ED bed 14 to advance ET tube 1cm as per MD. Michelle Almanzar at 27 at the lips size 7.0mm size tube.

## 2018-05-08 NOTE — ED TRIAGE NOTES
Patient arrived from nursing facility. Unknown last known well time. Patient was intubated by EMS in the field. Level 1 stroke upgraded to level 2.  Immediate transport to CT per MD.

## 2018-05-08 NOTE — H&P
History and Physical    Patient: Baljeet Leavitt               Sex: male          DOA: 5/8/2018       YOB: 1950      Age:  76 y.o.        LOS:  LOS: 0 days        Chief Complaint   [facility]         HPI:     Baljeet Leavitt is a 76 y.o. male who presents with via EMS unresponsive . Per report he is resident at [address] with hx stroke with Left hemiparesis and G tube. He was sating 40's in the field and was intubated by EMS PTA. In the ED code S was called. Seen by tele neurology. CT head was suspect for acute/subacute infarct. Patient MRI brain pending . Patient will be admitted to ICU for further stroke work up. Patient was also noted to be dehydrated , hypernatremic and acute renal failure . He was given bolus IVF 30 ml/kg stat by ED and started on antibiotics for possible sepsis . No past medical history on file. Domitila Thorne No past surgical history on file. No current facility-administered medications on file prior to encounter. No current outpatient prescriptions on file prior to encounter. Social History     Social History    Marital status:      Spouse name: N/A    Number of children: N/A    Years of education: N/A     Occupational History    Not on file. Social History Main Topics    Smoking status: Not on file    Smokeless tobacco: Not on file    Alcohol use Not on file    Drug use: Not on file    Sexual activity: Not on file     Other Topics Concern    Not on file     Social History Narrative       None       No family history on file. No Known Allergies    Review of Systems: Unable to obtain Unresponsive       Physical Exam:       Visit Vitals    BP 98/67    Pulse 61    Temp 95 °F (35 °C)    Resp 12    Ht 6' (1.829 m)    Wt 72.6 kg (160 lb)    SpO2 100%    BMI 21.7 kg/m2       Physical Exam   Constitutional: He is intubated. HENT:   Head: Normocephalic. Eyes: Conjunctivae are normal. No scleral icterus. Cardiovascular: Normal rate, regular rhythm and normal heart sounds. No murmur heard. Pulmonary/Chest: Effort normal and breath sounds normal. He is intubated. Abdominal: Soft. Bowel sounds are normal.   Musculoskeletal: He exhibits no edema. Neurological: He is unresponsive. Skin: Skin is warm. No rash noted. No erythema. No pallor. Psychiatric:   Unresponsive        Ancillary Studies: All lab and imaging reviewed for the past 24 hours. Recent Results (from the past 24 hour(s))   TYPE & SCREEN    Collection Time: 05/08/18  2:15 PM   Result Value Ref Range    Crossmatch Expiration 05/11/2018     ABO/Rh(D) A POSITIVE     Antibody screen NEG    POC CHEM8    Collection Time: 05/08/18  2:43 PM   Result Value Ref Range    CO2, POC 25 (H) 19 - 24 MMOL/L    Glucose,  (H) 74 - 106 MG/DL    BUN,  (H) 7 - 18 MG/DL    Creatinine, POC 3.3 (H) 0.6 - 1.3 MG/DL    GFRAA, POC 23 (L) >60 ml/min/1.73m2    GFRNA, POC 19 (L) >60 ml/min/1.73m2    Sodium,  (H) 136 - 145 MMOL/L    Potassium, POC 4.7 3.5 - 5.5 MMOL/L    Calcium, ionized (POC) 1.20 1. 12 - 1.32 mmol/L    Chloride,  (H) 100 - 108 MMOL/L    Anion gap, POC 18 10 - 20      Hematocrit, POC 39 36 - 49 %    Hemoglobin, POC 13.3 12 - 16 G/DL   CBC W/O DIFF    Collection Time: 05/08/18  2:44 PM   Result Value Ref Range    WBC 12.7 4.6 - 13.2 K/uL    RBC 4.10 (L) 4.70 - 5.50 M/uL    HGB 11.7 (L) 13.0 - 16.0 g/dL    HCT 37.0 36.0 - 48.0 %    MCV 90.2 74.0 - 97.0 FL    MCH 28.5 24.0 - 34.0 PG    MCHC 31.6 31.0 - 37.0 g/dL    RDW 15.3 (H) 11.6 - 14.5 %    PLATELET 803 (L) 252 - 420 K/uL    MPV 11.1 9.2 - 49.2 FL   METABOLIC PANEL, COMPREHENSIVE    Collection Time: 05/08/18  2:44 PM   Result Value Ref Range    Sodium 158 (H) 136 - 145 mmol/L    Potassium 4.7 3.5 - 5.5 mmol/L    Chloride 125 (H) 100 - 108 mmol/L    CO2 26 21 - 32 mmol/L    Anion gap 7 3.0 - 18 mmol/L    Glucose 177 (H) 74 - 99 mg/dL     (H) 7.0 - 18 MG/DL    Creatinine 3.56 (H) 0.6 - 1.3 MG/DL    BUN/Creatinine ratio 29 (H) 12 - 20      GFR est AA 21 (L) >60 ml/min/1.73m2    GFR est non-AA 17 (L) >60 ml/min/1.73m2    Calcium 8.5 8.5 - 10.1 MG/DL    Bilirubin, total 0.6 0.2 - 1.0 MG/DL    ALT (SGPT) 42 16 - 61 U/L    AST (SGOT) 72 (H) 15 - 37 U/L    Alk.  phosphatase 104 45 - 117 U/L    Protein, total 6.8 6.4 - 8.2 g/dL    Albumin 1.5 (L) 3.4 - 5.0 g/dL    Globulin 5.3 (H) 2.0 - 4.0 g/dL    A-G Ratio 0.3 (L) 0.8 - 1.7     PROTHROMBIN TIME + INR    Collection Time: 05/08/18  2:44 PM   Result Value Ref Range    Prothrombin time 14.7 11.5 - 15.2 sec    INR 1.2 0.8 - 1.2     THROMBIN TIME    Collection Time: 05/08/18  2:44 PM   Result Value Ref Range    Thrombin time 18.5 (H) 13.8 - 18.2 SECS   FIBRINOGEN    Collection Time: 05/08/18  2:44 PM   Result Value Ref Range    Fibrinogen 441 210 - 451 mg/dL   ASPIRIN TEST    Collection Time: 05/08/18  2:44 PM   Result Value Ref Range    Aspirin test 415 (L) 620 - 672 ARU   POC LACTIC ACID    Collection Time: 05/08/18  2:45 PM   Result Value Ref Range    Lactic Acid (POC) 3.1 (HH) 0.4 - 2.0 mmol/L   URINALYSIS W/ RFLX MICROSCOPIC    Collection Time: 05/08/18  3:00 PM   Result Value Ref Range    Color DARK YELLOW      Appearance CLEAR      Specific gravity 1.023 1.005 - 1.030      pH (UA) 5.0 5.0 - 8.0      Protein 30 (A) NEG mg/dL    Glucose NEGATIVE  NEG mg/dL    Ketone NEGATIVE  NEG mg/dL    Bilirubin SMALL (A) NEG      Blood NEGATIVE  NEG      Urobilinogen 1.0 0.2 - 1.0 EU/dL    Nitrites NEGATIVE  NEG      Leukocyte Esterase TRACE (A) NEG     DRUG SCREEN, URINE    Collection Time: 05/08/18  3:00 PM   Result Value Ref Range    BENZODIAZEPINES NEGATIVE  NEG      BARBITURATES NEGATIVE  NEG      THC (TH-CANNABINOL) NEGATIVE  NEG      OPIATES NEGATIVE  NEG      PCP(PHENCYCLIDINE) NEGATIVE  NEG      COCAINE NEGATIVE  NEG      AMPHETAMINES NEGATIVE  NEG      METHADONE NEGATIVE  NEG      HDSCOM (NOTE)    URINE MICROSCOPIC ONLY    Collection Time: 05/08/18  3:00 PM   Result Value Ref Range    WBC 0 to 2 0 - 4 /hpf    RBC 0 0 - 5 /hpf    Epithelial cells FEW 0 - 5 /lpf    Bacteria NEGATIVE  NEG /hpf   POC G3    Collection Time: 05/08/18  3:02 PM   Result Value Ref Range    Device: VENT      FIO2 (POC) 0.50 %    pH (POC) 7.405 7.35 - 7.45      pCO2 (POC) 35.2 35.0 - 45.0 MMHG    pO2 (POC) 112 (H) 80 - 100 MMHG    HCO3 (POC) 22.5 22 - 26 MMOL/L    sO2 (POC) 99 (H) 92 - 97 %    Base deficit (POC) 3 mmol/L    Mode ASSIST CONTROL      Tidal volume 450 ml    Set Rate 15 bpm    PEEP/CPAP (POC) 7 cmH2O    PIP (POC) 21      Allens test (POC) N/A      Inspiratory Time 0.90 sec    Total resp. rate 15      Site LEFT RADIAL      Patient temp.  94.7      Specimen type (POC) ARTERIAL      Performed by Demetri Bhatti     Volume control plus YES     EKG, 12 LEAD, INITIAL    Collection Time: 05/08/18  3:06 PM   Result Value Ref Range    Ventricular Rate 65 BPM    Atrial Rate 65 BPM    P-R Interval 162 ms    QRS Duration 102 ms    Q-T Interval 462 ms    QTC Calculation (Bezet) 480 ms    Calculated P Axis 76 degrees    Calculated R Axis -4 degrees    Calculated T Axis 55 degrees    Diagnosis       Sinus rhythm with occasional premature ventricular complexes  Possible Left atrial enlargement  Septal infarct , age undetermined  Abnormal ECG  No previous ECGs available         Assessment/Plan     Principal Problem:    Encephalopathy (5/8/2018)    Active Problems:    Respiratory failure ([address] (5/8/2018)      Dehydration (5/8/2018)      Lactic acidosis (5/8/2018)      Hypernatremia (5/8/2018)      SIMIN (acute kidney injury) ([address] (5/8/2018)      CVA (cerebral vascular accident) ([facility] (5/8/2018)        PLAN:    Encephalopathy   - possible metabolic vs stroke   - CT head shows acute / subacute stroke   - MRI brain , MRA brain and neck pending   - ECHO ordered  - Neurology consult in AM     CVA   - CT head noted   - MRI brain pending  - echo PENDING   - Permissive BP for 24 hr  - Aspirin supp       Respiratory Failure   - intubated PTA   - Intensivist consulted     Dehydration   - IVF   - BMP in AM     Hypernatremia   - 2/2 above   - Dextrose in 1/2 NS   - Monitor Na level q6     Lactic acidosis   - trend   - 2/2 hypovolemia , sepsis not likely     SIMIN  - Prerenal   - IVF  - Recheck BMP in AM     Elevated troponin  - trend     Hx stroke     DVT prophylaxis     Full code       Yojana Michael MD  5/8/2018  4:02 PM

## 2018-05-08 NOTE — ED PROVIDER NOTES
EMERGENCY DEPARTMENT HISTORY AND PHYSICAL EXAM    2:04 PM      Date: 5/8/2018  Patient Name: Marly Coyle    History of Presenting Illness     Chief Complaint   Patient presents with    Unresponsive         History Provided By: EMS; limited by pt's condition: unresponsive    Chief Complaint: Unresponsive  Duration: 1 Hours  Timing:  Acute  Location: N/A  Quality: N/A  Severity: N/A  Modifying Factors: N/A  Associated Symptoms: N/A      Additional History (Context): Marly Coyle is a 76 y.o. male with stroke who presents unresponsive per EMS from [facility] starting approx 1 hr PTA. On EMS arrival pt with NSR, spO2 64% with short, shallow, irregular breathing. Pt intubated with 7.0 ET, 22 at lips. EMS report fixed dilated R pupils, so Code S activated. Per EMS pt is full code. Hx CVA. Hx drug abuse per pt's daughter. PCP: Mic So MD        Past History     Past Medical History:  No past medical history on file. Past Surgical History:  No past surgical history on file. Family History:  No family history on file. Social History:  Social History   Substance Use Topics    Smoking status: Not on file    Smokeless tobacco: Not on file    Alcohol use Not on file       Allergies:  No Known Allergies      Review of Systems       Review of Systems   Unable to perform ROS: Patient unresponsive         Physical Exam     Visit Vitals    /81    Pulse 64    Temp 95 °F (35 °C)    Resp 15    Ht 6' (1.829 m)    Wt 72.6 kg (160 lb)    SpO2 (!) 88%    BMI 21.7 kg/m2         Physical Exam   Constitutional: He is chemically paralyzed and intubated. HENT:   Head: Normocephalic and atraumatic. Eyes: Conjunctivae are normal. No scleral icterus. Pinpoint pupils   Neck: Neck supple. Cardiovascular: Normal rate and normal heart sounds. No murmur heard. Pulmonary/Chest: Breath sounds normal. He is intubated. Abdominal: Soft.  Bowel sounds are normal.   G tube in place Musculoskeletal: He exhibits no edema. Lymphadenopathy:     He has no cervical adenopathy. Neurological: He is unresponsive. Skin: Skin is dry. No rash noted. Nursing note and vitals reviewed. Exam limited by pt's condition: unresponsive, chemically paralized. Diagnostic Study Results     Labs -  Recent Results (from the past 12 hour(s))   TYPE & SCREEN    Collection Time: 05/08/18  2:15 PM   Result Value Ref Range    Crossmatch Expiration 05/11/2018     ABO/Rh(D) PENDING     Antibody screen PENDING    POC CHEM8    Collection Time: 05/08/18  2:43 PM   Result Value Ref Range    CO2, POC 25 (H) 19 - 24 MMOL/L    Glucose,  (H) 74 - 106 MG/DL    BUN,  (H) 7 - 18 MG/DL    Creatinine, POC 3.3 (H) 0.6 - 1.3 MG/DL    GFRAA, POC 23 (L) >60 ml/min/1.73m2    GFRNA, POC 19 (L) >60 ml/min/1.73m2    Sodium,  (H) 136 - 145 MMOL/L    Potassium, POC 4.7 3.5 - 5.5 MMOL/L    Calcium, ionized (POC) 1.20 1. 12 - 1.32 mmol/L    Chloride,  (H) 100 - 108 MMOL/L    Anion gap, POC 18 10 - 20      Hematocrit, POC 39 36 - 49 %    Hemoglobin, POC 13.3 12 - 16 G/DL   CBC W/O DIFF    Collection Time: 05/08/18  2:44 PM   Result Value Ref Range    WBC 12.7 4.6 - 13.2 K/uL    RBC 4.10 (L) 4.70 - 5.50 M/uL    HGB 11.7 (L) 13.0 - 16.0 g/dL    HCT 37.0 36.0 - 48.0 %    MCV 90.2 74.0 - 97.0 FL    MCH 28.5 24.0 - 34.0 PG    MCHC 31.6 31.0 - 37.0 g/dL    RDW 15.3 (H) 11.6 - 14.5 %    PLATELET 070 (L) 529 - 420 K/uL    MPV 11.1 9.2 - 44.6 FL   METABOLIC PANEL, COMPREHENSIVE    Collection Time: 05/08/18  2:44 PM   Result Value Ref Range    Sodium 158 (H) 136 - 145 mmol/L    Potassium 4.7 3.5 - 5.5 mmol/L    Chloride 125 (H) 100 - 108 mmol/L    CO2 26 21 - 32 mmol/L    Anion gap 7 3.0 - 18 mmol/L    Glucose 177 (H) 74 - 99 mg/dL     (H) 7.0 - 18 MG/DL    Creatinine 3.56 (H) 0.6 - 1.3 MG/DL    BUN/Creatinine ratio 29 (H) 12 - 20      GFR est AA 21 (L) >60 ml/min/1.73m2    GFR est non-AA 17 (L) >60 ml/min/1.73m2    Calcium 8.5 8.5 - 10.1 MG/DL    Bilirubin, total 0.6 0.2 - 1.0 MG/DL    ALT (SGPT) 42 16 - 61 U/L    AST (SGOT) 72 (H) 15 - 37 U/L    Alk. phosphatase 104 45 - 117 U/L    Protein, total 6.8 6.4 - 8.2 g/dL    Albumin 1.5 (L) 3.4 - 5.0 g/dL    Globulin 5.3 (H) 2.0 - 4.0 g/dL    A-G Ratio 0.3 (L) 0.8 - 1.7     PROTHROMBIN TIME + INR    Collection Time: 05/08/18  2:44 PM   Result Value Ref Range    Prothrombin time 14.7 11.5 - 15.2 sec    INR 1.2 0.8 - 1.2     THROMBIN TIME    Collection Time: 05/08/18  2:44 PM   Result Value Ref Range    Thrombin time 18.5 (H) 13.8 - 18.2 SECS   FIBRINOGEN    Collection Time: 05/08/18  2:44 PM   Result Value Ref Range    Fibrinogen 441 210 - 451 mg/dL   ASPIRIN TEST    Collection Time: 05/08/18  2:44 PM   Result Value Ref Range    Aspirin test 415 (L) 620 - 672 ARU   POC LACTIC ACID    Collection Time: 05/08/18  2:45 PM   Result Value Ref Range    Lactic Acid (POC) 3.1 (HH) 0.4 - 2.0 mmol/L   POC G3    Collection Time: 05/08/18  3:02 PM   Result Value Ref Range    Device: VENT      FIO2 (POC) 0.50 %    pH (POC) 7.405 7.35 - 7.45      pCO2 (POC) 35.2 35.0 - 45.0 MMHG    pO2 (POC) 112 (H) 80 - 100 MMHG    HCO3 (POC) 22.5 22 - 26 MMOL/L    sO2 (POC) 99 (H) 92 - 97 %    Base deficit (POC) 3 mmol/L    Mode ASSIST CONTROL      Tidal volume 450 ml    Set Rate 15 bpm    PEEP/CPAP (POC) 7 cmH2O    PIP (POC) 21      Allens test (POC) N/A      Inspiratory Time 0.90 sec    Total resp. rate 15      Site LEFT RADIAL      Patient temp.  94.7      Specimen type (POC) ARTERIAL      Performed by Lazarus Bras     Volume control plus YES     EKG, 12 LEAD, INITIAL    Collection Time: 05/08/18  3:06 PM   Result Value Ref Range    Ventricular Rate 65 BPM    Atrial Rate 65 BPM    P-R Interval 162 ms    QRS Duration 102 ms    Q-T Interval 462 ms    QTC Calculation (Bezet) 480 ms    Calculated P Axis 76 degrees    Calculated R Axis -4 degrees    Calculated T Axis 55 degrees    Diagnosis Sinus rhythm with occasional premature ventricular complexes  Possible Left atrial enlargement  Septal infarct , age undetermined  Abnormal ECG  No previous ECGs available         Radiologic Studies -     Ct Head Wo Cont    Result Date: 5/8/2018  EXAM: CT head INDICATION: Unresponsive COMPARISON: No prior comparison study. TECHNIQUE: Axial CT imaging of the head was performed without intravenous contrast.  Additional coronal and sagittal reconstructions were performed. One or more dose reduction techniques were used on this CT: automated exposure control, adjustment of the mAs and/or kVp according to patient's size, and iterative reconstruction techniques. The specific techniques utilized on this CT exam have been documented in the patient's electronic medical record. _______________ FINDINGS: VENTRICLES/EXTRA-AXIAL SPACES: The ventricles and sulci are normal in their size and configuration. BRAIN PARENCHYMA: There is a large confluent area of abnormal parenchymal hypodensity involving much of the right cerebral hemisphere involving both cortex and white matter. There is involvement of right occipital lobe extending into the parietal lobe and perhaps portion of the posterior temporal lobe. Some of this area has suggestion of cortical volume loss, although not diffusely. There may be mild amount of adjacent sulcal effacement in the parietal region. More clearly delineated and cephalized changes are seen more anteriorly in the right frontotemporal region with cortical atrophy and volume loss and underlying hypodensity. Elongated hypodensity is seen involving the lateral aspect of the right basal ganglia involving external capsule, portion of the lentiform nucleus, and extending into the adjacent corona radiata suggesting chronic infarct. Discrete small area of encephalomalacia seen in the contralateral posterior left frontal cortex towards the vertex, such as axial image 26 suggesting small chronic cortical infarct. Elongated hypodensity is seen involving the right central diana suggesting axonal degeneration from chronic infarct. There is no evidence of acute intracranial hemorrhage, midline shift, or herniation. ORBITS: The visualized orbits are unremarkable. PARANASAL SINUSES/MASTOIDS: Visualized paranasal sinuses are clear. Visualized mastoid air cells are clear. OSSEOUS STRUCTURES: No fracture is seen. OTHER: None.  _______________     IMPRESSION: 1. No acute intracranial hemorrhage, midline shift, or herniation. 2. Right frontotemporal and lateral basal ganglia encephalomalacic changes most likely from chronic infarct. Additional small chronic posterior left frontal cortical infarct was the vertex. 3. Right parieto-occipital parenchymal hypodensity very suspicious for infarct, although more age indeterminate, suspect portions acute/subacute in age, although there may be small areas of more chronic infarct present. No prior comparison study. Please note that noncontrast head CT may be normal in early acute infarct. 4. Mild nonspecific white matter disease likely representing chronic small vessel changes. CRITICAL RESULT:  These critical results on this CODE S patient were directly communicated to Dr. Jo Lux at 2:21 PM on 5/8/2018. Results understood and acknowledged. Xr Chest Port    Result Date: 5/8/2018  Portable Chest  History: Suspected stroke, unresponsive Comparison: No prior study Portable view of the chest demonstrates the cardiomediastinal silhouette is within normal limits. Endotracheal tube is present with estimated just below the level the clavicular heads within the tracheal airway in satisfactory position. Mild linear opacities are seen in the left lung base suggesting subsegmental atelectasis or scarring. Calcified granuloma is seen in the right lung base, alternatively in the hepatic dome. Right costophrenic angle is slightly blunted potentially representing small effusion.  No consolidation or pneumothorax is seen. Degenerative changes are seen in the spine. IMPRESSION: 1. No focal consolidation. Probable left lung base subsegmental atelectasis versus scarring. 2. Possible very small right pleural effusion. 3. Endotracheal tube in satisfactory radiographic position. Medical Decision Making   I am the first provider for this patient. I reviewed the vital signs, available nursing notes, past medical history, past surgical history, family history and social history. Vital Signs-Reviewed the patient's vital signs. Pulse Oximetry Analysis -  VentIlated     Cardiac Monitor:  Rate: 65      Records Reviewed: Nursing Notes and Old Medical Records (Time of Review: 2:04 PM)    ED Course: Progress Notes, Reevaluation, and Consults:    14:24 Consult:  Discussed care with Dr. Claude Gay (Teleneurology). Standard discussion; including history of patients chief complaint, available diagnostic results, and treatment course. Recommends MRI/MRA, no intervention due to age and unknown time. 14:39 Pt's daughter reports L sided residual weakness from prior CVA. 15:10 Temperature 95F rectal.     15:30 ET tube a little too high, will advance 1 cm. 15:50 Consult:  Discussed care with Dr. Jesi Zamudio (Intensivist). Standard discussion; including history of patients chief complaint, available diagnostic results, and treatment course. Will consult. 15:57 Consult:  Discussed care with Dr. Louise Xiong (Hospitalist). Standard discussion; including history of patients chief complaint, available diagnostic results, and treatment course. Accepts for admission. Provider Notes (Medical Decision Making):   MDM  Number of Diagnoses or Management Options  Acute CVA (cerebrovascular accident) [facility]):   Acute renal failure, unspecified acute renal failure type [facility]):   Acute respiratory failure with hypoxia ([facility] [address]):    Hyponatremia:   Hypothermia, initial encounter:   Uremia:   Diagnosis management comments: Unresponsive in ED intubated for respiratory failure by ems with sat's 42's code S onarrival with Ct possible acute vs subacute CVA R parietal/occipital asa rectal given , noted hypothermic fluids abx started for possible sepsis , sawant placed for renal failure     ett adjusted IV X 2 established with US guidance pt admitted for further care       Procedures:     Indication: unable to get IV access/blood  Skin Preparation: Hand hygiene performed prior to venous catheter insertion. The area was cleansed and prepped with alcohol. Procedure: 18 gauge IV placed in R AC location. Vascular probe used in live manner. Vein identified. 1 attempts. IV secured. Good venous flow. No complication. Assessment: Good patency and blood return. Post-procedure: Patient tolerated the procedure well with no immediate complications. Performed by Augusto Burgess MD 14:30    Indication: unable to get IV access/blood  Skin Preparation: Hand hygiene performed prior to venous catheter insertion. The area was cleansed and prepped with alcohol. Procedure: 18 gauge IV placed in L AC location. Vascular probe used in live manner. Vein identified. 1 attempts. IV secured. Good venous flow. No complication. Assessment: Good patency and blood return. Post-procedure: Patient tolerated the procedure well with no immediate complications. Performed by Augusto Burgess MD 14:41      Core Measures:     Code S initiated at [id number]. Critical Care Time:    CRITICAL CARE:  15:45  I have spent 60 minutes of critical care time involved in lab review, consultations with specialist, family decision-making, and documentation. During this entire length of time I was immediately available to the patient. Critical Care:   The reason for providing this level of medical care for this critically ill patient was due a critical illness that impaired one or more vital organ systems such that there was a high probability of imminent or life threatening deterioration in the patients condition. This care involved high complexity decision making to assess, manipulate, and support vital system functions, to treat this degreee vital organ system failure and to prevent further life threatening deterioration of the patients condition. For Hospitalized Patients:    1. Hospitalization Decision Time:  The decision to hospitalize the patient was made by Dr. Ely Golden at 14:10 on 5/8/2018    2. Aspirin: Aspirin was given. Diagnosis     Clinical Impression: No diagnosis found. Disposition: Admit          Patient's Medications    No medications on file     _______________________________    Attestations:  Scribe Attestation     Marsha Liu acting as a scribe for and in the presence of Carlo Gerard MD      May 08, 2018 at 4:08 PM       Provider Attestation:      I personally performed the services described in the documentation, reviewed the documentation, as recorded by the scribe in my presence, and it accurately and completely records my words and actions.  May 08, 2018 at 4:08 PM - Carlo Gerard MD    _______________________________

## 2018-05-08 NOTE — IP AVS SNAPSHOT
[address] [address] 
[phone] Patient: Kwaku Russo MRN: [id number] JDO:[birth year] A check familia indicates which time of day the medication should be taken. My Medications START taking these medications Instructions Each Dose to Equal  
 Morning Noon Evening Bedtime  
 amLODIPine 10 mg tablet Commonly known as:  Raji Albert Start taking on:  5/17/2018 Your last dose was: Your next dose is: Take 1 Tab by mouth daily. 10 mg  
    
   
   
   
  
 apixaban 5 mg tablet Commonly known as:  Huntervictor m Valenzuela Your last dose was: Your next dose is: Take 10 mg BID x 7 days then 5 mg BID  
     
   
   
   
  
 aspirin 325 mg tablet Commonly known as:  ASPIRIN Start taking on:  5/17/2018 Your last dose was: Your next dose is: Take 1 Tab by mouth daily. 325 mg  
    
   
   
   
  
 collagenase 250 unit/gram ointment Commonly known as:  SANTYL Start taking on:  5/17/2018 Your last dose was: Your next dose is:    
   
   
 Apply  to affected area daily. predniSONE 20 mg tablet Commonly known as:  Esperanza Jean Your last dose was: Your next dose is: Take 2 Tabs by mouth daily (with breakfast) for 5 days. 40 mg Where to Get Your Medications Information on where to get these meds will be given to you by the nurse or doctor. ! Ask your nurse or doctor about these medications  
  amLODIPine 10 mg tablet  
 apixaban 5 mg tablet  
 aspirin 325 mg tablet  
 collagenase 250 unit/gram ointment  
 predniSONE 20 mg tablet

## 2018-05-08 NOTE — PROGRESS NOTES
Pharmacy Dosing Services: Vancomycin    Indication: Sepsis of Unknown Etiology    Day of therapy: 0    Other Antimicrobials (Include dose, start day & day of therapy):  Piperacillin-Tazobactam 3.375 grams every 6 hours, 2018 ER patient, renally adjusted from 4.5 grams every 6 hours    Loading dose (date given): 1,750 mg  Current Maintenance dose: New Start    Goal Vancomycin Level: 15-20 mcg/mL  (Trough 15-20 for most infections, 20 for meningitis/osteomyelitis, pre-HD level ~25)    Vancomycin Level (if drawn): N/A     Significant Cultures:   N/A    Renal function stable? (unstable defined as SCr increase of 0.5 mg/dL or > 50% increase from baseline, whichever is greater) (Y/N): N     CAPD, Hemodialysis or Renal Replacement Therapy (Y/N): N     Recent Labs      18   1444   CREA  3.56*   BUN  105*   WBC  12.7     Temp (24hrs), Av.1 °F (35.1 °C), Min:94.9 °F (34.9 °C), Max:95.4 °F (35.2 °C)    Creatinine Clearance (Creatinine Clearance (ml/min)): 20.4     Regimen assessment: New start sepsis patient  Maintenance dose: Pharmacy dosing per level  Next scheduled level: 2018 at 1500       Pharmacy will follow daily and adjust medications as appropriate for renal function and/or serum levels.     Thank you,  Reggie Atkins, PHARMD

## 2018-05-08 NOTE — ED NOTES
Due to MRI being occupied at time of transfer, this patient was taken to ICU 2604. Report provided to Darrell Gonzalez [place]. Patient remains only responsive to tactile stimuli, intubated, pressure is above 90 SBP, per orders, Norepinephrine is to be hung if SBP is less than 90. Bag provided to oncoming ICU nurse. Patient's skin reviewed and notification of 1500 draw lactic acid given with 4 hour repeat time. Temp sawant in place. Patient's temperature has begun to rise, now 96.2 F, from 95 prior reading.

## 2018-05-08 NOTE — PROGRESS NOTES
2320 Received patient from the ED, on vent, plugged into red outlet. Current vent settings: ACVC+, RATE 15, , FiO2 35%, PEEP 7. SpO2 97%. Suction setup and ambu bag at bedside.  -PSS

## 2018-05-08 NOTE — ED NOTES
This nurse [facility] and notified [facility]coming ICU nurse that patient needs MRI. Screening completed with patient's daughter.

## 2018-05-08 NOTE — PROGRESS NOTES
Patient not candidate for tPA due to unknown last known normal (facility states a range of last known normal) or intervention due to high pre-hospital mRS

## 2018-05-08 NOTE — CONSULTS
[facility]  Pulmonary, Critical Care, and Sleep Medicine    Name: Delroy Nolasco MRN: [id number]  : 1950 Hospital: [address] [place] [address]  Date: 2018       [facility] Initial Patient Consult                                              Consult requesting physician: Shelley Burgess MD    Reason for Consult: VDRF    I have reviewed the flowsheet and notes. Events, vitals, medications and notes from last 24 hours reviewed. Care plan discussed with staff    Subjective:  2018     IMPRESSION:  VDRF - Pt's ABG on the vent looks ok. Cont current settings. Cxr shows ETT is in good position. No acute infiltrate on Cxr  Encephalopathy - ? Etiology. Pt's CT head shows poss new CVA. MRI/MRA brain already ordered, still pending. Tele neurology has been consulted by ER MD.  CVA - Pt has hx of previous CVA. CT head shows poss acute CVA. Pt is scheduled for MRI/MRA brain. He has received ASA and Echo has been ordered. Defer to neurology  Elevated LA - Pt has been treated with IVF and will trend LA  Sepsis - Pt has been empirically started on broad spectrum abx. No inf on Cxr, U/a looks negative, Pt has multiple sores which could be the source. SIMIN with Hypernatremia - This appears to be due to dehydration. Continue hydration with IVF  Hypotension - this is likely due to hypovolemia. Give more boluses of NSS  DVT and GI proph - treat with heparin and pepcid    Code status: Full    OTHER:  Glycemic Control. Glucose stabilizer per ICU protocol when on insulin drip. Maintain blood glucose 140-180. Replace electrolytes per ICU electrolyte replacement protocol  Ventilator bundle & Sedation protocol followed. Daily morning sedation holiday, assessment for readiness for SBT & weaning from ventilator; and then re-titrate if required. Aim to keep peak plateau pressure 96-03TD H2O in ARDS patient. Crosby tube to suction at 20-30 cm H2O, Maintain Crosby tube with 5-10ml air every 4 hours.  Chlorhexidine mouth washes and routine oral care every 4 hours. Stress ulcer and DVT prophylaxis. HOB >=30 degree elevation all the time. HOB >=30 degree elevation all the time. Aggressive pulmonary toileting. Incentive spirometry when appropriate. Aspiration precautions. Sepsis bundle and protocol followed. Follow serial lactic acid when >2, fluid resuscitation. Draw cultures before antibiotic. Follow cultures. Antibiotic choice. Administer antibiotic within 1 hr ICU admission and 3 hours of ED arrival. Deescalate antibiotic when appropriate. Vasopressor when appropriate with MAP goal >65 mmHg. Sawant bundle followed, remove sawant catheter when not critically ill. Skin & Wound care. PT/OT eval and treat. OOB/IS when appropriate. Further recommendations will be based on the patient's response to recommended treatment and results of the investigation ordered. Quality Care: Stress ulcer prophylaxis, DVT prophylaxis, HOB elevated, Infection control all reviewed and addressed. Events and notes from last 24 hours reviewed. Care plan discussed with nursing. See my orders for detail. CC TIME: >50 min   Further management depending on test results and work up as outlined above. History of Present Illness:    Sivakumar Bridges has been seen and evaluated in ICU. Patient is a 76 y.o. male with PMHx significant for CVA, Peg tube who presents via EMS with Altered mental status . Per report he is resident at [address] with hx stroke with Left hemiparesis and G tube. He was sating 40's in the field and was intubated by EMS PTA. In the ED, code S was called. Seen by tele neurology. CT head was suspect for acute/subacute infarct. Patient MRI brain pending. On my assessment, pt is intubated on the vent. No response to voice. The patient is critically ill and can not provide additional history due to Ventilated.    History taken from chart    Review of Systems:  Review of systems not obtained due to patient factors. Medication Reviewed      No Known Allergies   No past medical history on file. No past surgical history on file. Social History   Substance Use Topics    Smoking status: Not on file    Smokeless tobacco: Not on file    Alcohol use Not on file      No family history on file.    Prior to Admission medications    Not on File     Current Facility-Administered Medications   Medication Dose Route Frequency    Please update patient allergy  1 Each Other DAILY    levoFLOXacin (LEVAQUIN) 750 mg in D5W IVPB  750 mg IntraVENous Q48H    piperacillin-tazobactam (ZOSYN) 3.375 g in 0.9% sodium chloride (MBP/ADV) 100 mL MBP  3.375 g IntraVENous Q6H    vancomycin (VANCOCIN) 750 mg in 0.9% sodium chloride (MBP/ADV) 250 mL ADV  750 mg IntraVENous ONCE    [START ON 5/9/2018] VANCOMYCIN INFORMATION NOTE   Other ONCE    Vancomyicn pharmacy dosing per level   Other Rx Dosing/Monitoring    sodium chloride (NS) flush 5-10 mL  5-10 mL IntraVENous Q8H    heparin (porcine) injection 5,000 Units  5,000 Units SubCUTAneous Q8H    dextrose 5 % - 0.45% NaCl infusion  125 mL/hr IntraVENous CONTINUOUS    [START ON 5/9/2018] aspirin (ASA) suppository 300 mg  300 mg Rectal DAILY    NOREPINephrine (LEVOPHED) 8 mg in 0.9% NS 250ml infusion  2-16 mcg/min IntraVENous TITRATE    Norepinephrine Bitartrate (LEVOPHED) 8 mg/250 mL (32 mcg/mL) in NS 0.9% 250 ml infusion          Peripheral Intravenous Line:  Peripheral IV 05/08/18 Right Antecubital (Active)   Site Assessment Clean, dry, & intact 5/8/2018  4:51 PM   Phlebitis Assessment 0 5/8/2018  4:51 PM   Infiltration Assessment 0 5/8/2018  4:51 PM   Dressing Status Clean, dry, & intact 5/8/2018  4:51 PM   Hub Color/Line Status Green 5/8/2018  4:51 PM       Peripheral IV 05/08/18 Left Antecubital (Active)   Site Assessment Clean, dry, & intact 5/8/2018  4:51 PM   Phlebitis Assessment 0 5/8/2018  4:51 PM   Infiltration Assessment 0 5/8/2018  4:51 PM   Dressing Status Clean, dry, & intact 2018  4:51 PM   Hub Color/Line Status Green 2018  4:51 PM     Drain(s):  PEG/Gastrostomy Tube 18 (Active)     Airway:  Airway - Endotracheal Tube 18 Oral (Active)   Insertion Depth (cm) 27 cm 2018  4:06 PM   Line Gee Lips 2018  4:06 PM   Side Secured Right 2018  4:06 PM   Site Assessment Clean, dry, & intact 2018  4:06 PM       Objective:  Vital Signs:    Visit Vitals    /65    Pulse 71    Temp 96.4 °F (35.8 °C)    Resp 21    Ht 6' (1.829 m)    Wt 72.6 kg (160 lb)    SpO2 98%    BMI 21.7 kg/m2            Temp (24hrs), Av.3 °F (35.2 °C), Min:94.9 °F (34.9 °C), Max:96.4 °F (35.8 °C)    Ventilator Settings:  Mode Rate Tidal Volume Pressure FiO2 PEEP  Assist control, VC+   450 ml    30 % (titrated down from 40% fio2) 7 cm H20    Peak airway pressure: 19 cm H2O   Plateau pressure:    Tidal volume:   Minute ventilation: 6.94 l/min  SPO2     ARDS network Guidelines: Lung protective strategy and Plateau pressure goals less than or equal to 30      Physical Exam:   General/Neurology: On vent, no response to voice commands  Head:   Normocephalic, without obvious abnormality  Eye:   no scleral icterus, no pallor  Oral:   Mucus membranes moist  Neck:   Supple  Lung:   B/l air entry is fair  Heart:   Regular rate & rhythm. S1 S2 present.   Abdomen: Soft, non tender, BS+nt  Extremities:  No pedal edema    Data:      Recent Results (from the past 24 hour(s))   TYPE & SCREEN    Collection Time: 18  2:15 PM   Result Value Ref Range    Crossmatch Expiration 2018     ABO/Rh(D) A POSITIVE     Antibody screen NEG    POC CHEM8    Collection Time: 18  2:43 PM   Result Value Ref Range    CO2, POC 25 (H) 19 - 24 MMOL/L    Glucose,  (H) 74 - 106 MG/DL    BUN,  (H) 7 - 18 MG/DL    Creatinine, POC 3.3 (H) 0.6 - 1.3 MG/DL    GFRAA, POC 23 (L) >60 ml/min/1.73m2    GFRNA, POC 19 (L) >60 ml/min/1.73m2    Sodium,  (H) 136 - 145 MMOL/L    Potassium, POC 4.7 3.5 - 5.5 MMOL/L    Calcium, ionized (POC) 1.20 1. 12 - 1.32 mmol/L    Chloride,  (H) 100 - 108 MMOL/L    Anion gap, POC 18 10 - 20      Hematocrit, POC 39 36 - 49 %    Hemoglobin, POC 13.3 12 - 16 G/DL   CBC W/O DIFF    Collection Time: 05/08/18  2:44 PM   Result Value Ref Range    WBC 12.7 4.6 - 13.2 K/uL    RBC 4.10 (L) 4.70 - 5.50 M/uL    HGB 11.7 (L) 13.0 - 16.0 g/dL    HCT 37.0 36.0 - 48.0 %    MCV 90.2 74.0 - 97.0 FL    MCH 28.5 24.0 - 34.0 PG    MCHC 31.6 31.0 - 37.0 g/dL    RDW 15.3 (H) 11.6 - 14.5 %    PLATELET 059 (L) 105 - 420 K/uL    MPV 11.1 9.2 - 92.8 FL   METABOLIC PANEL, COMPREHENSIVE    Collection Time: 05/08/18  2:44 PM   Result Value Ref Range    Sodium 158 (H) 136 - 145 mmol/L    Potassium 4.7 3.5 - 5.5 mmol/L    Chloride 125 (H) 100 - 108 mmol/L    CO2 26 21 - 32 mmol/L    Anion gap 7 3.0 - 18 mmol/L    Glucose 177 (H) 74 - 99 mg/dL     (H) 7.0 - 18 MG/DL    Creatinine 3.56 (H) 0.6 - 1.3 MG/DL    BUN/Creatinine ratio 29 (H) 12 - 20      GFR est AA 21 (L) >60 ml/min/1.73m2    GFR est non-AA 17 (L) >60 ml/min/1.73m2    Calcium 8.5 8.5 - 10.1 MG/DL    Bilirubin, total 0.6 0.2 - 1.0 MG/DL    ALT (SGPT) 42 16 - 61 U/L    AST (SGOT) 72 (H) 15 - 37 U/L    Alk.  phosphatase 104 45 - 117 U/L    Protein, total 6.8 6.4 - 8.2 g/dL    Albumin 1.5 (L) 3.4 - 5.0 g/dL    Globulin 5.3 (H) 2.0 - 4.0 g/dL    A-G Ratio 0.3 (L) 0.8 - 1.7     PROTHROMBIN TIME + INR    Collection Time: 05/08/18  2:44 PM   Result Value Ref Range    Prothrombin time 14.7 11.5 - 15.2 sec    INR 1.2 0.8 - 1.2     THROMBIN TIME    Collection Time: 05/08/18  2:44 PM   Result Value Ref Range    Thrombin time 18.5 (H) 13.8 - 18.2 SECS   FIBRINOGEN    Collection Time: 05/08/18  2:44 PM   Result Value Ref Range    Fibrinogen 441 210 - 451 mg/dL   ASPIRIN TEST    Collection Time: 05/08/18  2:44 PM   Result Value Ref Range    Aspirin test 415 (L) 620 - 672 ARU   POC LACTIC ACID    Collection Time: 05/08/18  2:45 PM   Result Value Ref Range    Lactic Acid (POC) 3.1 (HH) 0.4 - 2.0 mmol/L   URINALYSIS W/ RFLX MICROSCOPIC    Collection Time: 05/08/18  3:00 PM   Result Value Ref Range    Color DARK YELLOW      Appearance CLEAR      Specific gravity 1.023 1.005 - 1.030      pH (UA) 5.0 5.0 - 8.0      Protein 30 (A) NEG mg/dL    Glucose NEGATIVE  NEG mg/dL    Ketone NEGATIVE  NEG mg/dL    Bilirubin SMALL (A) NEG      Blood NEGATIVE  NEG      Urobilinogen 1.0 0.2 - 1.0 EU/dL    Nitrites NEGATIVE  NEG      Leukocyte Esterase TRACE (A) NEG     DRUG SCREEN, URINE    Collection Time: 05/08/18  3:00 PM   Result Value Ref Range    BENZODIAZEPINES NEGATIVE  NEG      BARBITURATES NEGATIVE  NEG      THC (TH-CANNABINOL) NEGATIVE  NEG      OPIATES NEGATIVE  NEG      PCP(PHENCYCLIDINE) NEGATIVE  NEG      COCAINE NEGATIVE  NEG      AMPHETAMINES NEGATIVE  NEG      METHADONE NEGATIVE  NEG      HDSCOM (NOTE)    URINE MICROSCOPIC ONLY    Collection Time: 05/08/18  3:00 PM   Result Value Ref Range    WBC 0 to 2 0 - 4 /hpf    RBC 0 0 - 5 /hpf    Epithelial cells FEW 0 - 5 /lpf    Bacteria NEGATIVE  NEG /hpf   POC G3    Collection Time: 05/08/18  3:02 PM   Result Value Ref Range    Device: VENT      FIO2 (POC) 0.50 %    pH (POC) 7.405 7.35 - 7.45      pCO2 (POC) 35.2 35.0 - 45.0 MMHG    pO2 (POC) 112 (H) 80 - 100 MMHG    HCO3 (POC) 22.5 22 - 26 MMOL/L    sO2 (POC) 99 (H) 92 - 97 %    Base deficit (POC) 3 mmol/L    Mode ASSIST CONTROL      Tidal volume 450 ml    Set Rate 15 bpm    PEEP/CPAP (POC) 7 cmH2O    PIP (POC) 21      Allens test (POC) N/A      Inspiratory Time 0.90 sec    Total resp. rate 15      Site LEFT RADIAL      Patient temp.  94.7      Specimen type (POC) ARTERIAL      Performed by Carlene Enriquez     Volume control plus YES     EKG, 12 LEAD, INITIAL    Collection Time: 05/08/18  3:06 PM   Result Value Ref Range    Ventricular Rate 65 BPM    Atrial Rate 65 BPM    P-R Interval 162 ms    QRS Duration 102 ms    Q-T Interval 462 ms    QTC Calculation (Bezet) 480 ms    Calculated P Axis 76 degrees    Calculated R Axis -4 degrees    Calculated T Axis 55 degrees    Diagnosis       Sinus rhythm with occasional premature ventricular complexes  Possible Left atrial enlargement  Septal infarct , age undetermined  Abnormal ECG  No previous ECGs available           Chemistry Recent Labs      05/08/18   1444   GLU  177*   NA  158*   K  4.7   CL  125*   CO2  26   BUN  105*   CREA  3.56*   CA  8.5   AGAP  7   BUCR  29*   AP  104   TP  6.8   ALB  1.5*   GLOB  5.3*   AGRAT  0.3*       CBC w/Diff Recent Labs      05/08/18   1444   WBC  12.7   RBC  4.10*   HGB  11.7*   HCT  37.0   PLT  133*       ABG  Recent Labs      05/08/18   1502   PHI  7.405   PCO2I  35.2   PO2I  112*   HCO3I  22.5   FIO2I  0.50     CT (Most Recent)    Results from Hospital Encounter encounter on 05/08/18   CT HEAD WO CONT   Narrative EXAM: CT head    INDICATION: Unresponsive    COMPARISON: No prior comparison study. TECHNIQUE: Axial CT imaging of the head was performed without intravenous  contrast.  Additional coronal and sagittal reconstructions were performed. One  or more dose reduction techniques were used on this CT: automated exposure  control, adjustment of the mAs and/or kVp according to patient's size, and  iterative reconstruction techniques. The specific techniques utilized on this CT  exam have been documented in the patient's electronic medical record.  _______________    FINDINGS:    VENTRICLES/EXTRA-AXIAL SPACES: The ventricles and sulci are normal in their size  and configuration. BRAIN PARENCHYMA: There is a large confluent area of abnormal parenchymal  hypodensity involving much of the right cerebral hemisphere involving both  cortex and white matter. There is involvement of right occipital lobe extending  into the parietal lobe and perhaps portion of the posterior temporal lobe. Some  of this area has suggestion of cortical volume loss, although not diffusely.   There may be mild amount of adjacent sulcal effacement in the parietal region. More clearly delineated and cephalized changes are seen more anteriorly in the  right frontotemporal region with cortical atrophy and volume loss and underlying  hypodensity. Elongated hypodensity is seen involving the lateral aspect of the  right basal ganglia involving external capsule, portion of the lentiform  nucleus, and extending into the adjacent corona radiata suggesting chronic  infarct. Discrete small area of encephalomalacia seen in the contralateral posterior left  frontal cortex towards the vertex, such as axial image 26 suggesting small  chronic cortical infarct. Elongated hypodensity is seen involving the right  central diana suggesting axonal degeneration from chronic infarct. There is no evidence of acute intracranial hemorrhage, midline shift, or  herniation. ORBITS: The visualized orbits are unremarkable. PARANASAL SINUSES/MASTOIDS: Visualized paranasal sinuses are clear. Visualized  mastoid air cells are clear. OSSEOUS STRUCTURES: No fracture is seen. OTHER: None.      _______________         Impression IMPRESSION:    1. No acute intracranial hemorrhage, midline shift, or herniation. 2. Right frontotemporal and lateral basal ganglia encephalomalacic changes most  likely from chronic infarct. Additional small chronic posterior left frontal  cortical infarct was the vertex. 3. Right parieto-occipital parenchymal hypodensity very suspicious for infarct,  although more age indeterminate, suspect portions acute/subacute in age,  although there may be small areas of more chronic infarct present. No prior  comparison study. Please note that noncontrast head CT may be normal in early  acute infarct. 4. Mild nonspecific white matter disease likely representing chronic small  vessel changes.      CRITICAL RESULT:    These critical results on this CODE S patient were directly communicated to Dr. Mackenzie Ott at 2:21 PM on 5/8/2018. Results understood and acknowledged. XR (Most Recent). CXR reviewed by me and compared with previous CXR   Results from Hospital Encounter encounter on 05/08/18   XR CHEST PORT   Narrative Portable Chest      History: Suspected stroke, unresponsive    Comparison: No prior study    Portable view of the chest demonstrates the cardiomediastinal silhouette is  within normal limits. Endotracheal tube is present with estimated just below the  level the clavicular heads within the tracheal airway in satisfactory position. Mild linear opacities are seen in the left lung base suggesting subsegmental  atelectasis or scarring. Calcified granuloma is seen in the right lung base,  alternatively in the hepatic dome. Right costophrenic angle is slightly blunted  potentially representing small effusion. No consolidation or pneumothorax is  seen. Degenerative changes are seen in the spine. Impression IMPRESSION:    1. No focal consolidation. Probable left lung base subsegmental atelectasis  versus scarring. 2. Possible very small right pleural effusion. 3. Endotracheal tube in satisfactory radiographic position. High complexity decision making was performed during the evaluation of this patient at high risk for decompensation with multiple organ involvement     Above mentioned total time spent on reviewing the case/medical record/data/notes/EMR/patient examination/documentation/coordinating care with nurse/consultants, exclusive of procedures with complex decision making performed and > 50% time spent in face to face evaluation. Scooter Decker MD  5/8/2018     Addendum - 10pm - Pt re-evaluated multiple times. Pt is now on 3 pressors, Levophed, khai and Vaso maxed out on all 3 pressors. This is likely septic shock. 3 sets of cardiac enzymes ordered. Echo is also ordered.    MRI/MRA are still pending as pt is not hemodynamically stable to go down for the MRI    I talked to patients daughter and updated her about pt's condition. I informed her that he is critically ill and has a very high risk of mortality.    Additional critical care time - 60mins

## 2018-05-09 ENCOUNTER — APPOINTMENT (OUTPATIENT)
Dept: GENERAL RADIOLOGY | Age: 68
DRG: 871 | End: 2018-05-09
Attending: SURGERY
Payer: MEDICARE

## 2018-05-09 ENCOUNTER — APPOINTMENT (OUTPATIENT)
Dept: MRI IMAGING | Age: 68
DRG: 871 | End: 2018-05-09
Attending: EMERGENCY MEDICINE
Payer: MEDICARE

## 2018-05-09 ENCOUNTER — APPOINTMENT (OUTPATIENT)
Dept: GENERAL RADIOLOGY | Age: 68
DRG: 871 | End: 2018-05-09
Attending: INTERNAL MEDICINE
Payer: MEDICARE

## 2018-05-09 PROBLEM — J95.811 POSTPROCEDURAL PNEUMOTHORAX: Status: ACTIVE | Noted: 2018-05-09

## 2018-05-09 PROBLEM — R77.8 ELEVATED TROPONIN: Status: ACTIVE | Noted: 2018-05-09

## 2018-05-09 PROBLEM — A41.9 SEPSIS (HCC): Status: ACTIVE | Noted: 2018-05-09

## 2018-05-09 PROBLEM — R73.9 HYPERGLYCEMIA: Status: ACTIVE | Noted: 2018-05-09

## 2018-05-09 PROBLEM — R74.8 ELEVATED CK: Status: ACTIVE | Noted: 2018-05-09

## 2018-05-09 LAB
AMMONIA PLAS-SCNC: 21 UMOL/L (ref 11–32)
ANION GAP SERPL CALC-SCNC: 11 MMOL/L (ref 3–18)
ARTERIAL PATENCY WRIST A: ABNORMAL
ATRIAL RATE: 64 BPM
ATRIAL RATE: 65 BPM
BASE DEFICIT BLD-SCNC: 7 MMOL/L
BASOPHILS # BLD: 0 K/UL (ref 0–0.06)
BASOPHILS NFR BLD: 0 % (ref 0–2)
BDY SITE: ABNORMAL
BODY TEMPERATURE: 37.5
BUN SERPL-MCNC: 95 MG/DL (ref 7–18)
BUN/CREAT SERPL: 34 (ref 12–20)
CALCIUM SERPL-MCNC: 7.1 MG/DL (ref 8.5–10.1)
CALCULATED P AXIS, ECG09: 76 DEGREES
CALCULATED P AXIS, ECG09: 78 DEGREES
CALCULATED R AXIS, ECG10: -4 DEGREES
CALCULATED R AXIS, ECG10: -6 DEGREES
CALCULATED T AXIS, ECG11: 55 DEGREES
CALCULATED T AXIS, ECG11: 55 DEGREES
CHLORIDE SERPL-SCNC: 126 MMOL/L (ref 100–108)
CK MB CFR SERPL CALC: 0.9 % (ref 0–4)
CK MB CFR SERPL CALC: 1.1 % (ref 0–4)
CK MB CFR SERPL CALC: 1.2 % (ref 0–4)
CK MB CFR SERPL CALC: 1.2 % (ref 0–4)
CK MB SERPL-MCNC: 10.9 NG/ML (ref 5–25)
CK MB SERPL-MCNC: 3.8 NG/ML (ref 5–25)
CK MB SERPL-MCNC: 5.4 NG/ML (ref 5–25)
CK MB SERPL-MCNC: 7.6 NG/ML (ref 5–25)
CK SERPL-CCNC: 405 U/L (ref 39–308)
CK SERPL-CCNC: 510 U/L (ref 39–308)
CK SERPL-CCNC: 611 U/L (ref 39–308)
CK SERPL-CCNC: 874 U/L (ref 39–308)
CO2 SERPL-SCNC: 18 MMOL/L (ref 21–32)
CREAT SERPL-MCNC: 2.79 MG/DL (ref 0.6–1.3)
CREAT UR-MCNC: 123 MG/DL (ref 30–125)
DIAGNOSIS, 93000: NORMAL
DIAGNOSIS, 93000: NORMAL
DIFFERENTIAL METHOD BLD: ABNORMAL
EOSINOPHIL # BLD: 0 K/UL (ref 0–0.4)
EOSINOPHIL NFR BLD: 0 % (ref 0–5)
ERYTHROCYTE [DISTWIDTH] IN BLOOD BY AUTOMATED COUNT: 15.8 % (ref 11.6–14.5)
EST. AVERAGE GLUCOSE BLD GHB EST-MCNC: 140 MG/DL
GAS FLOW.O2 O2 DELIVERY SYS: ABNORMAL L/MIN
GAS FLOW.O2 SETTING OXYMISER: 15 BPM
GLUCOSE BLD STRIP.AUTO-MCNC: 133 MG/DL (ref 70–110)
GLUCOSE BLD STRIP.AUTO-MCNC: 155 MG/DL (ref 70–110)
GLUCOSE BLD STRIP.AUTO-MCNC: 234 MG/DL (ref 70–110)
GLUCOSE BLD STRIP.AUTO-MCNC: 262 MG/DL (ref 70–110)
GLUCOSE SERPL-MCNC: 209 MG/DL (ref 74–99)
HBA1C MFR BLD: 6.5 % (ref 4.2–5.6)
HCO3 BLD-SCNC: 17.8 MMOL/L (ref 22–26)
HCT VFR BLD AUTO: 35.5 % (ref 36–48)
HGB BLD-MCNC: 10.9 G/DL (ref 13–16)
INSPIRATION.DURATION SETTING TIME VENT: 0.9 SEC
LACTATE SERPL-SCNC: 2.1 MMOL/L (ref 0.4–2)
LACTATE SERPL-SCNC: 2.2 MMOL/L (ref 0.4–2)
LACTATE SERPL-SCNC: 2.2 MMOL/L (ref 0.4–2)
LACTATE SERPL-SCNC: 2.4 MMOL/L (ref 0.4–2)
LACTATE SERPL-SCNC: 2.4 MMOL/L (ref 0.4–2)
LACTATE SERPL-SCNC: 9.5 MMOL/L (ref 0.4–2)
LYMPHOCYTES # BLD: 0.8 K/UL (ref 0.9–3.6)
LYMPHOCYTES NFR BLD: 7 % (ref 21–52)
MAGNESIUM SERPL-MCNC: 3 MG/DL (ref 1.6–2.6)
MCH RBC QN AUTO: 27.9 PG (ref 24–34)
MCHC RBC AUTO-ENTMCNC: 30.7 G/DL (ref 31–37)
MCV RBC AUTO: 91 FL (ref 74–97)
MONOCYTES # BLD: 0.5 K/UL (ref 0.05–1.2)
MONOCYTES NFR BLD: 4 % (ref 3–10)
NEUTS SEG # BLD: 11.5 K/UL (ref 1.8–8)
NEUTS SEG NFR BLD: 89 % (ref 40–73)
O2/TOTAL GAS SETTING VFR VENT: 0.35 %
P-R INTERVAL, ECG05: 162 MS
P-R INTERVAL, ECG05: 164 MS
PCO2 BLD: 31.6 MMHG (ref 35–45)
PEEP RESPIRATORY: 7 CMH2O
PH BLD: 7.36 [PH] (ref 7.35–7.45)
PIP ISTAT,IPIP: 18
PLATELET # BLD AUTO: 128 K/UL (ref 135–420)
PMV BLD AUTO: 11.4 FL (ref 9.2–11.8)
PO2 BLD: 168 MMHG (ref 80–100)
POTASSIUM SERPL-SCNC: 4.8 MMOL/L (ref 3.5–5.5)
Q-T INTERVAL, ECG07: 462 MS
Q-T INTERVAL, ECG07: 466 MS
QRS DURATION, ECG06: 102 MS
QRS DURATION, ECG06: 96 MS
QTC CALCULATION (BEZET), ECG08: 480 MS
QTC CALCULATION (BEZET), ECG08: 480 MS
RBC # BLD AUTO: 3.9 M/UL (ref 4.7–5.5)
SAO2 % BLD: 99 % (ref 92–97)
SERVICE CMNT-IMP: ABNORMAL
SODIUM SERPL-SCNC: 154 MMOL/L (ref 136–145)
SODIUM SERPL-SCNC: 155 MMOL/L (ref 136–145)
SODIUM SERPL-SCNC: 156 MMOL/L (ref 136–145)
SODIUM UR-SCNC: 14 MMOL/L (ref 20–110)
SPECIMEN TYPE: ABNORMAL
T4 FREE SERPL-MCNC: 1.7 NG/DL (ref 0.7–1.5)
TOTAL RESP. RATE, ITRR: 25
TROPONIN I SERPL-MCNC: 0.05 NG/ML (ref 0–0.04)
TROPONIN I SERPL-MCNC: 0.05 NG/ML (ref 0–0.04)
TROPONIN I SERPL-MCNC: 0.06 NG/ML (ref 0–0.04)
TROPONIN I SERPL-MCNC: 0.08 NG/ML (ref 0–0.04)
TSH SERPL DL<=0.05 MIU/L-ACNC: 1.07 UIU/ML (ref 0.36–3.74)
VANCOMYCIN SERPL-MCNC: 14.8 UG/ML (ref 5–40)
VENTILATION MODE VENT: ABNORMAL
VENTRICULAR RATE, ECG03: 64 BPM
VENTRICULAR RATE, ECG03: 65 BPM
VOLUME CONTROL PLUS IVLCP: YES
VT SETTING VENT: 450 ML
WBC # BLD AUTO: 12.8 K/UL (ref 4.6–13.2)

## 2018-05-09 PROCEDURE — 74011636637 HC RX REV CODE- 636/637: Performed by: FAMILY MEDICINE

## 2018-05-09 PROCEDURE — 70544 MR ANGIOGRAPHY HEAD W/O DYE: CPT

## 2018-05-09 PROCEDURE — 80048 BASIC METABOLIC PNL TOTAL CA: CPT | Performed by: FAMILY MEDICINE

## 2018-05-09 PROCEDURE — 74011250637 HC RX REV CODE- 250/637: Performed by: FAMILY MEDICINE

## 2018-05-09 PROCEDURE — 82570 ASSAY OF URINE CREATININE: CPT | Performed by: INTERNAL MEDICINE

## 2018-05-09 PROCEDURE — 77030020253 HC SOL INJ D545NS .05 DEX .45 SAL

## 2018-05-09 PROCEDURE — 84300 ASSAY OF URINE SODIUM: CPT | Performed by: INTERNAL MEDICINE

## 2018-05-09 PROCEDURE — 74011000250 HC RX REV CODE- 250: Performed by: INTERNAL MEDICINE

## 2018-05-09 PROCEDURE — 71045 X-RAY EXAM CHEST 1 VIEW: CPT

## 2018-05-09 PROCEDURE — 74011000258 HC RX REV CODE- 258: Performed by: FAMILY MEDICINE

## 2018-05-09 PROCEDURE — 77030011256 HC DRSG MEPILEX <16IN NO BORD MOLN -A

## 2018-05-09 PROCEDURE — 93306 TTE W/DOPPLER COMPLETE: CPT

## 2018-05-09 PROCEDURE — 77030018846 HC SOL IRR STRL H20 ICUM -A

## 2018-05-09 PROCEDURE — 74011250636 HC RX REV CODE- 250/636: Performed by: INTERNAL MEDICINE

## 2018-05-09 PROCEDURE — 65610000006 HC RM INTENSIVE CARE

## 2018-05-09 PROCEDURE — 70547 MR ANGIOGRAPHY NECK W/O DYE: CPT

## 2018-05-09 PROCEDURE — 36600 WITHDRAWAL OF ARTERIAL BLOOD: CPT

## 2018-05-09 PROCEDURE — 84295 ASSAY OF SERUM SODIUM: CPT | Performed by: FAMILY MEDICINE

## 2018-05-09 PROCEDURE — 36415 COLL VENOUS BLD VENIPUNCTURE: CPT | Performed by: FAMILY MEDICINE

## 2018-05-09 PROCEDURE — 74011000258 HC RX REV CODE- 258: Performed by: EMERGENCY MEDICINE

## 2018-05-09 PROCEDURE — 77030037878 HC DRSG MEPILEX >48IN BORD MOLN -B

## 2018-05-09 PROCEDURE — 74011250636 HC RX REV CODE- 250/636: Performed by: FAMILY MEDICINE

## 2018-05-09 PROCEDURE — 82140 ASSAY OF AMMONIA: CPT | Performed by: HOSPITALIST

## 2018-05-09 PROCEDURE — 85025 COMPLETE CBC W/AUTO DIFF WBC: CPT | Performed by: FAMILY MEDICINE

## 2018-05-09 PROCEDURE — 36591 DRAW BLOOD OFF VENOUS DEVICE: CPT

## 2018-05-09 PROCEDURE — 82962 GLUCOSE BLOOD TEST: CPT

## 2018-05-09 PROCEDURE — 83605 ASSAY OF LACTIC ACID: CPT | Performed by: FAMILY MEDICINE

## 2018-05-09 PROCEDURE — 83036 HEMOGLOBIN GLYCOSYLATED A1C: CPT | Performed by: FAMILY MEDICINE

## 2018-05-09 PROCEDURE — 94003 VENT MGMT INPAT SUBQ DAY: CPT

## 2018-05-09 PROCEDURE — 74011000258 HC RX REV CODE- 258: Performed by: INTERNAL MEDICINE

## 2018-05-09 PROCEDURE — 77030020250 HC SOL INJ D 5% LFCR 1000ML BG LF

## 2018-05-09 PROCEDURE — 82553 CREATINE MB FRACTION: CPT | Performed by: FAMILY MEDICINE

## 2018-05-09 PROCEDURE — 82803 BLOOD GASES ANY COMBINATION: CPT

## 2018-05-09 PROCEDURE — 80202 ASSAY OF VANCOMYCIN: CPT | Performed by: EMERGENCY MEDICINE

## 2018-05-09 PROCEDURE — 70551 MRI BRAIN STEM W/O DYE: CPT

## 2018-05-09 PROCEDURE — 74011250636 HC RX REV CODE- 250/636: Performed by: EMERGENCY MEDICINE

## 2018-05-09 PROCEDURE — 83605 ASSAY OF LACTIC ACID: CPT | Performed by: INTERNAL MEDICINE

## 2018-05-09 RX ORDER — MAGNESIUM SULFATE 100 %
4 CRYSTALS MISCELLANEOUS AS NEEDED
Status: DISCONTINUED | OUTPATIENT
Start: 2018-05-09 | End: 2018-05-16 | Stop reason: HOSPADM

## 2018-05-09 RX ORDER — DEXTROSE 50 % IN WATER (D50W) INTRAVENOUS SYRINGE
25-50 AS NEEDED
Status: DISCONTINUED | OUTPATIENT
Start: 2018-05-09 | End: 2018-05-16 | Stop reason: HOSPADM

## 2018-05-09 RX ORDER — INSULIN GLARGINE 100 [IU]/ML
6 INJECTION, SOLUTION SUBCUTANEOUS DAILY
Status: DISCONTINUED | OUTPATIENT
Start: 2018-05-09 | End: 2018-05-10

## 2018-05-09 RX ORDER — INSULIN LISPRO 100 [IU]/ML
INJECTION, SOLUTION INTRAVENOUS; SUBCUTANEOUS
Status: DISCONTINUED | OUTPATIENT
Start: 2018-05-09 | End: 2018-05-09

## 2018-05-09 RX ORDER — DEXTROSE MONOHYDRATE 50 MG/ML
35 INJECTION, SOLUTION INTRAVENOUS CONTINUOUS
Status: DISCONTINUED | OUTPATIENT
Start: 2018-05-09 | End: 2018-05-12

## 2018-05-09 RX ORDER — INSULIN LISPRO 100 [IU]/ML
INJECTION, SOLUTION INTRAVENOUS; SUBCUTANEOUS EVERY 6 HOURS
Status: DISCONTINUED | OUTPATIENT
Start: 2018-05-10 | End: 2018-05-16 | Stop reason: HOSPADM

## 2018-05-09 RX ADMIN — INSULIN GLARGINE 6 UNITS: 100 INJECTION, SOLUTION SUBCUTANEOUS at 17:49

## 2018-05-09 RX ADMIN — HEPARIN SODIUM 5000 UNITS: 5000 INJECTION, SOLUTION INTRAVENOUS; SUBCUTANEOUS at 18:31

## 2018-05-09 RX ADMIN — PIPERACILLIN SODIUM,TAZOBACTAM SODIUM 3.38 G: 3; .375 INJECTION, POWDER, FOR SOLUTION INTRAVENOUS at 08:46

## 2018-05-09 RX ADMIN — ASPIRIN 300 MG: 300 SUPPOSITORY RECTAL at 08:46

## 2018-05-09 RX ADMIN — Medication 10 ML: at 07:34

## 2018-05-09 RX ADMIN — Medication 10 ML: at 13:48

## 2018-05-09 RX ADMIN — INSULIN LISPRO 3 UNITS: 100 INJECTION, SOLUTION INTRAVENOUS; SUBCUTANEOUS at 17:52

## 2018-05-09 RX ADMIN — PIPERACILLIN SODIUM,TAZOBACTAM SODIUM 3.38 G: 3; .375 INJECTION, POWDER, FOR SOLUTION INTRAVENOUS at 03:29

## 2018-05-09 RX ADMIN — HEPARIN SODIUM 5000 UNITS: 5000 INJECTION, SOLUTION INTRAVENOUS; SUBCUTANEOUS at 11:07

## 2018-05-09 RX ADMIN — DEXTROSE MONOHYDRATE 125 ML/HR: 5 INJECTION, SOLUTION INTRAVENOUS at 11:07

## 2018-05-09 RX ADMIN — INSULIN LISPRO 6 UNITS: 100 INJECTION, SOLUTION INTRAVENOUS; SUBCUTANEOUS at 11:19

## 2018-05-09 RX ADMIN — FAMOTIDINE 20 MG: 10 INJECTION INTRAVENOUS at 08:46

## 2018-05-09 RX ADMIN — DEXTROSE MONOHYDRATE 50 ML/HR: 5 INJECTION, SOLUTION INTRAVENOUS at 18:30

## 2018-05-09 RX ADMIN — SODIUM CHLORIDE 1250 MG: 900 INJECTION, SOLUTION INTRAVENOUS at 20:29

## 2018-05-09 RX ADMIN — FENTANYL CITRATE 50 MCG: 50 INJECTION, SOLUTION INTRAMUSCULAR; INTRAVENOUS at 15:45

## 2018-05-09 RX ADMIN — HYDROCORTISONE SODIUM SUCCINATE 50 MG: 100 INJECTION, POWDER, FOR SOLUTION INTRAMUSCULAR; INTRAVENOUS at 22:20

## 2018-05-09 RX ADMIN — HYDROCORTISONE SODIUM SUCCINATE 50 MG: 100 INJECTION, POWDER, FOR SOLUTION INTRAMUSCULAR; INTRAVENOUS at 05:00

## 2018-05-09 RX ADMIN — FAMOTIDINE 20 MG: 10 INJECTION INTRAVENOUS at 20:15

## 2018-05-09 RX ADMIN — HYDROCORTISONE SODIUM SUCCINATE 50 MG: 100 INJECTION, POWDER, FOR SOLUTION INTRAMUSCULAR; INTRAVENOUS at 11:06

## 2018-05-09 RX ADMIN — Medication 10 ML: at 22:25

## 2018-05-09 RX ADMIN — HYDROCORTISONE SODIUM SUCCINATE 50 MG: 100 INJECTION, POWDER, FOR SOLUTION INTRAMUSCULAR; INTRAVENOUS at 17:49

## 2018-05-09 RX ADMIN — HEPARIN SODIUM 5000 UNITS: 5000 INJECTION, SOLUTION INTRAVENOUS; SUBCUTANEOUS at 03:00

## 2018-05-09 RX ADMIN — Medication 10 ML: at 22:19

## 2018-05-09 RX ADMIN — DEXTROSE MONOHYDRATE AND SODIUM CHLORIDE 125 ML/HR: 5; .45 INJECTION, SOLUTION INTRAVENOUS at 06:32

## 2018-05-09 RX ADMIN — MIDAZOLAM HYDROCHLORIDE 1 MG: 1 INJECTION, SOLUTION INTRAMUSCULAR; INTRAVENOUS at 13:46

## 2018-05-09 RX ADMIN — PIPERACILLIN SODIUM,TAZOBACTAM SODIUM 3.38 G: 3; .375 INJECTION, POWDER, FOR SOLUTION INTRAVENOUS at 17:49

## 2018-05-09 NOTE — PROGRESS NOTES
Problem: Patient Education: Go to Patient Education Activity  Goal: Patient/Family Education  Outcome: Progressing Towards Goal  Teaching to daughter and to pt in hope he understands, unalbe to assess pt does not interact with nurse other than grabbing    Problem: Falls - Risk of  Goal: *Absence of Falls  Document Blanca Fall Risk and appropriate interventions in the flowsheet. Outcome: Progressing Towards Goal  Fall Risk Interventions:            Medication Interventions: Bed/chair exit alarm, Patient to call before getting OOB, Teach patient to arise slowly, Evaluate medications/consider consulting pharmacy    Elimination Interventions: Bed/chair exit alarm, Call light in reach, Toileting schedule/hourly rounds             Problem: Patient Education: Go to Patient Education Activity  Goal: Patient/Family Education  Outcome: Progressing Towards Goal  daughter    Problem: Pressure Injury - Risk of  Goal: *Prevention of pressure injury  Document Rene Scale and appropriate interventions in the flowsheet. Outcome: Progressing Towards Goal  Pressure Injury Interventions:  Sensory Interventions: Assess changes in LOC, Float heels, Keep linens dry and wrinkle-free, Maintain/enhance activity level, Minimize linen layers, Monitor skin under medical devices, Pressure redistribution bed/mattress (bed type), Pad between skin to skin    Moisture Interventions: Absorbent underpads, Minimize layers, Moisture barrier, Maintain skin hydration (lotion/cream), Apply protective barrier, creams and emollients, Assess need for specialty bed, Check for incontinence Q2 hours and as needed, Contain wound drainage (called for speciality bed)    Activity Interventions: PT/OT evaluation, Pressure redistribution bed/mattress(bed type), Assess need for specialty bed    Mobility Interventions: HOB 30 degrees or less, Float heels, Pressure redistribution bed/mattress (bed type), Suspension boots, Turn and reposition approx.  every two hours(pillow and wedges)    Nutrition Interventions: Document food/fluid/supplement intake    Friction and Shear Interventions: Apply protective barrier, creams and emollients, Foam dressings/transparent film/skin sealants, HOB 30 degrees or less, Minimize layers, Transferring/repositioning devices

## 2018-05-09 NOTE — PROGRESS NOTES
Progress Note      Patient: Rahcele Pradhan               Sex: male          DOA: 5/8/2018       YOB: 1950      Age:  76 y.o.        LOS:  LOS: 1 day               Subjective:   Rachele Pradhan is a 76 y.o. male who presents with via EMS unresponsive . Per report he is resident at [address] with hx stroke with Left hemiparesis and G tube. He was sating 40's in the field and was intubated by EMS PTA. In the ED code S was called. Seen by tele neurology. CT head was suspect for acute/subacute infarct. Patient MRI brain pending . Patient will be admitted to ICU for further stroke work up. Patient was also noted to be dehydrated , hypernatremic and acute renal failure . He was given bolus IVF 30 ml/kg stat by ED and started on antibiotics for possible sepsis . Patient was noted to have pneumothorax post intubation. Surgery called for emergent chest tube. Objective:      Visit Vitals    BP (!) 184/95    Pulse 80    Temp (!) 37.4 °F (3 °C)    Resp 18    Ht 6' (1.829 m)    Wt 72.6 kg (160 lb)    SpO2 100%    BMI 21.7 kg/m2         Physical Exam   Constitutional: He is intubated. HENT:   Head: Normocephalic. Eyes: No scleral icterus. Cardiovascular: Normal rate, regular rhythm and normal heart sounds. No murmur heard. Pulmonary/Chest: Effort normal and breath sounds normal. He is intubated. Abdominal: Soft. Bowel sounds are normal.   Musculoskeletal: He exhibits no edema. Neurological: He is unresponsive. Skin: Skin is warm. No rash noted. No erythema. No pallor.    Psychiatric:   unresponsive       Intake and Output:  Current Shift:     Last three shifts:  05/07 1901 - 05/09 0700  In: 1425.6 [I.V.:1425.6]  Out: 220 [Urine:220]    Recent Results (from the past 48 hour(s))   TYPE & SCREEN    Collection Time: 05/08/18  2:15 PM   Result Value Ref Range    Crossmatch Expiration 05/11/2018     ABO/Rh(D) A POSITIVE     Antibody screen NEG    CULTURE, BLOOD    Collection Time: 05/08/18  2:30 PM   Result Value Ref Range    Special Requests: NO SPECIAL REQUESTS      Culture result: NO GROWTH AFTER 15 HOURS     POC CHEM8    Collection Time: 05/08/18  2:43 PM   Result Value Ref Range    CO2, POC 25 (H) 19 - 24 MMOL/L    Glucose,  (H) 74 - 106 MG/DL    BUN,  (H) 7 - 18 MG/DL    Creatinine, POC 3.3 (H) 0.6 - 1.3 MG/DL    GFRAA, POC 23 (L) >60 ml/min/1.73m2    GFRNA, POC 19 (L) >60 ml/min/1.73m2    Sodium,  (H) 136 - 145 MMOL/L    Potassium, POC 4.7 3.5 - 5.5 MMOL/L    Calcium, ionized (POC) 1.20 1. 12 - 1.32 mmol/L    Chloride,  (H) 100 - 108 MMOL/L    Anion gap, POC 18 10 - 20      Hematocrit, POC 39 36 - 49 %    Hemoglobin, POC 13.3 12 - 16 G/DL   CBC W/O DIFF    Collection Time: 05/08/18  2:44 PM   Result Value Ref Range    WBC 12.7 4.6 - 13.2 K/uL    RBC 4.10 (L) 4.70 - 5.50 M/uL    HGB 11.7 (L) 13.0 - 16.0 g/dL    HCT 37.0 36.0 - 48.0 %    MCV 90.2 74.0 - 97.0 FL    MCH 28.5 24.0 - 34.0 PG    MCHC 31.6 31.0 - 37.0 g/dL    RDW 15.3 (H) 11.6 - 14.5 %    PLATELET 964 (L) 566 - 420 K/uL    MPV 11.1 9.2 - 12.4 FL   METABOLIC PANEL, COMPREHENSIVE    Collection Time: 05/08/18  2:44 PM   Result Value Ref Range    Sodium 158 (H) 136 - 145 mmol/L    Potassium 4.7 3.5 - 5.5 mmol/L    Chloride 125 (H) 100 - 108 mmol/L    CO2 26 21 - 32 mmol/L    Anion gap 7 3.0 - 18 mmol/L    Glucose 177 (H) 74 - 99 mg/dL     (H) 7.0 - 18 MG/DL    Creatinine 3.56 (H) 0.6 - 1.3 MG/DL    BUN/Creatinine ratio 29 (H) 12 - 20      GFR est AA 21 (L) >60 ml/min/1.73m2    GFR est non-AA 17 (L) >60 ml/min/1.73m2    Calcium 8.5 8.5 - 10.1 MG/DL    Bilirubin, total 0.6 0.2 - 1.0 MG/DL    ALT (SGPT) 42 16 - 61 U/L    AST (SGOT) 72 (H) 15 - 37 U/L    Alk.  phosphatase 104 45 - 117 U/L    Protein, total 6.8 6.4 - 8.2 g/dL    Albumin 1.5 (L) 3.4 - 5.0 g/dL    Globulin 5.3 (H) 2.0 - 4.0 g/dL    A-G Ratio 0.3 (L) 0.8 - 1.7     PROTHROMBIN TIME + INR    Collection Time: 05/08/18  2:44 PM   Result Value Ref Range    Prothrombin time 14.7 11.5 - 15.2 sec    INR 1.2 0.8 - 1.2     THROMBIN TIME    Collection Time: 05/08/18  2:44 PM   Result Value Ref Range    Thrombin time 18.5 (H) 13.8 - 18.2 SECS   FIBRINOGEN    Collection Time: 05/08/18  2:44 PM   Result Value Ref Range    Fibrinogen 441 210 - 451 mg/dL   ASPIRIN TEST    Collection Time: 05/08/18  2:44 PM   Result Value Ref Range    Aspirin test 415 (L) 620 - 672 ARU   POC LACTIC ACID    Collection Time: 05/08/18  2:45 PM   Result Value Ref Range    Lactic Acid (POC) 3.1 (HH) 0.4 - 2.0 mmol/L   URINALYSIS W/ RFLX MICROSCOPIC    Collection Time: 05/08/18  3:00 PM   Result Value Ref Range    Color DARK YELLOW      Appearance CLEAR      Specific gravity 1.023 1.005 - 1.030      pH (UA) 5.0 5.0 - 8.0      Protein 30 (A) NEG mg/dL    Glucose NEGATIVE  NEG mg/dL    Ketone NEGATIVE  NEG mg/dL    Bilirubin SMALL (A) NEG      Blood NEGATIVE  NEG      Urobilinogen 1.0 0.2 - 1.0 EU/dL    Nitrites NEGATIVE  NEG      Leukocyte Esterase TRACE (A) NEG     DRUG SCREEN, URINE    Collection Time: 05/08/18  3:00 PM   Result Value Ref Range    BENZODIAZEPINES NEGATIVE  NEG      BARBITURATES NEGATIVE  NEG      THC (TH-CANNABINOL) NEGATIVE  NEG      OPIATES NEGATIVE  NEG      PCP(PHENCYCLIDINE) NEGATIVE  NEG      COCAINE NEGATIVE  NEG      AMPHETAMINES NEGATIVE  NEG      METHADONE NEGATIVE  NEG      HDSCOM (NOTE)    URINE MICROSCOPIC ONLY    Collection Time: 05/08/18  3:00 PM   Result Value Ref Range    WBC 0 to 2 0 - 4 /hpf    RBC 0 0 - 5 /hpf    Epithelial cells FEW 0 - 5 /lpf    Bacteria NEGATIVE  NEG /hpf   POC G3    Collection Time: 05/08/18  3:02 PM   Result Value Ref Range    Device: VENT      FIO2 (POC) 0.50 %    pH (POC) 7.405 7.35 - 7.45      pCO2 (POC) 35.2 35.0 - 45.0 MMHG    pO2 (POC) 112 (H) 80 - 100 MMHG    HCO3 (POC) 22.5 22 - 26 MMOL/L    sO2 (POC) 99 (H) 92 - 97 %    Base deficit (POC) 3 mmol/L    Mode ASSIST CONTROL      Tidal volume 450 ml    Set Rate 15 bpm PEEP/CPAP (POC) 7 cmH2O    PIP (POC) 21      Allens test (POC) N/A      Inspiratory Time 0.90 sec    Total resp. rate 15      Site LEFT RADIAL      Patient temp.  94.7      Specimen type (POC) ARTERIAL      Performed by Sarah Saldana     Volume control plus YES     EKG, 12 LEAD, INITIAL    Collection Time: 05/08/18  3:06 PM   Result Value Ref Range    Ventricular Rate 65 BPM    Atrial Rate 65 BPM    P-R Interval 162 ms    QRS Duration 102 ms    Q-T Interval 462 ms    QTC Calculation (Bezet) 480 ms    Calculated P Axis 76 degrees    Calculated R Axis -4 degrees    Calculated T Axis 55 degrees    Diagnosis       Sinus rhythm with occasional premature ventricular complexes  Possible Left atrial enlargement  Septal infarct , age undetermined  Abnormal ECG  No previous ECGs available     TSH 3RD GENERATION    Collection Time: 05/08/18 11:20 PM   Result Value Ref Range    TSH 1.07 0.36 - 3.74 uIU/mL   T4, FREE    Collection Time: 05/08/18 11:20 PM   Result Value Ref Range    T4, Free 1.7 (H) 0.7 - 1.5 NG/DL   MAGNESIUM    Collection Time: 05/08/18 11:20 PM   Result Value Ref Range    Magnesium 3.0 (H) 1.6 - 2.6 mg/dL   LACTIC ACID    Collection Time: 05/08/18 11:20 PM   Result Value Ref Range    Lactic acid 9.5 (HH) 0.4 - 2.0 MMOL/L   CARDIAC PANEL,(CK, CKMB & TROPONIN)    Collection Time: 05/08/18 11:20 PM   Result Value Ref Range     (H) 39 - 308 U/L    CK - MB 10.9 (H) <3.6 ng/ml    CK-MB Index 1.2 0.0 - 4.0 %    Troponin-I, Qt. 0.08 (H) 0.0 - 0.045 NG/ML   GLUCOSE, POC    Collection Time: 05/09/18 12:40 AM   Result Value Ref Range    Glucose (POC) 133 (H) 70 - 568 mg/dL   METABOLIC PANEL, BASIC    Collection Time: 05/09/18  3:55 AM   Result Value Ref Range    Sodium 155 (H) 136 - 145 mmol/L    Potassium 4.8 3.5 - 5.5 mmol/L    Chloride 126 (H) 100 - 108 mmol/L    CO2 18 (L) 21 - 32 mmol/L    Anion gap 11 3.0 - 18 mmol/L    Glucose 209 (H) 74 - 99 mg/dL    BUN 95 (H) 7.0 - 18 MG/DL    Creatinine 2.79 (H) 0.6 - 1.3 MG/DL    BUN/Creatinine ratio 34 (H) 12 - 20      GFR est AA 28 (L) >60 ml/min/1.73m2    GFR est non-AA 23 (L) >60 ml/min/1.73m2    Calcium 7.1 (L) 8.5 - 10.1 MG/DL   CBC WITH AUTOMATED DIFF    Collection Time: 05/09/18  3:55 AM   Result Value Ref Range    WBC 12.8 4.6 - 13.2 K/uL    RBC 3.90 (L) 4.70 - 5.50 M/uL    HGB 10.9 (L) 13.0 - 16.0 g/dL    HCT 35.5 (L) 36.0 - 48.0 %    MCV 91.0 74.0 - 97.0 FL    MCH 27.9 24.0 - 34.0 PG    MCHC 30.7 (L) 31.0 - 37.0 g/dL    RDW 15.8 (H) 11.6 - 14.5 %    PLATELET 399 (L) 569 - 420 K/uL    MPV 11.4 9.2 - 11.8 FL    NEUTROPHILS 89 (H) 40 - 73 %    LYMPHOCYTES 7 (L) 21 - 52 %    MONOCYTES 4 3 - 10 %    EOSINOPHILS 0 0 - 5 %    BASOPHILS 0 0 - 2 %    ABS. NEUTROPHILS 11.5 (H) 1.8 - 8.0 K/UL    ABS. LYMPHOCYTES 0.8 (L) 0.9 - 3.6 K/UL    ABS. MONOCYTES 0.5 0.05 - 1.2 K/UL    ABS. EOSINOPHILS 0.0 0.0 - 0.4 K/UL    ABS. BASOPHILS 0.0 0.0 - 0.06 K/UL    DF AUTOMATED     CARDIAC PANEL,(CK, CKMB & TROPONIN)    Collection Time: 05/09/18  3:55 AM   Result Value Ref Range     (H) 39 - 308 U/L    CK - MB 7.6 (H) <3.6 ng/ml    CK-MB Index 1.2 0.0 - 4.0 %    Troponin-I, Qt. 0.05 (H) 0.0 - 0.045 NG/ML   LACTIC ACID    Collection Time: 05/09/18  3:55 AM   Result Value Ref Range    Lactic acid 2.4 (HH) 0.4 - 2.0 MMOL/L   POC G3    Collection Time: 05/09/18  7:12 AM   Result Value Ref Range    Device: VENT      FIO2 (POC) 0.35 %    pH (POC) 7.361 7.35 - 7.45      pCO2 (POC) 31.6 (L) 35.0 - 45.0 MMHG    pO2 (POC) 168 (H) 80 - 100 MMHG    HCO3 (POC) 17.8 (L) 22 - 26 MMOL/L    sO2 (POC) 99 (H) 92 - 97 %    Base deficit (POC) 7 mmol/L    Mode ASSIST CONTROL      Tidal volume 450 ml    Set Rate 15 bpm    PEEP/CPAP (POC) 7 cmH2O    PIP (POC) 18      Allens test (POC) N/A      Inspiratory Time 0.90 sec    Total resp. rate 25      Site LEFT RADIAL      Patient temp.  37.5      Specimen type (POC) ARTERIAL      Performed by Alex Syracuse     Volume control plus YES     GLUCOSE, POC Collection Time: 05/09/18  7:31 AM   Result Value Ref Range    Glucose (POC) 234 (H) 70 - 110 mg/dL           XRays were reviewed in past 24 hours    Medications Reviewed      Continued hospitalization is indicated due to CVA, Encephalopathy, pneumothorax. Assessment/Plan     Principal Problem:    Encephalopathy (5/8/2018)    Active Problems:    Respiratory failure ([address] (5/8/2018)      Dehydration (5/8/2018)      Lactic acidosis (5/8/2018)      Hypernatremia (5/8/2018)      SIMIN (acute kidney injury) ([address] (5/8/2018)      CVA (cerebral vascular accident) [address] (5/8/2018)      Encephalopathy   - possible metabolic vs stroke   - CT head shows acute / subacute stroke   - MRI brain , MRA brain and neck pending   - ECHO ordered  - Neurology consult in AM      CVA   - CT head noted   - MRI brain pending  - echo PENDING   - Permissive BP for 24 hr  - Aspirin supp   - Neurology consulting.  Appreciated        Respiratory Failure   - intubated PTA   - Intensivist consulted      Dehydration   - Continue  IVF        Hypernatremia   - ->155  - 2/2 above   - Dextrose in water   - Monitor Na level q6      Lactic acidosis   - trend   - 2/2 hypovolemia , sepsis not likely      SIMIN  - Prerenal   - IVF  - Recheck BMP in AM      Elevated troponin  - demand      Hx stroke     Pneumothorax  - chest tube  - surgery following     DVT prophylaxis      Full code        Yarely Aburto MD  May 9, 2018

## 2018-05-09 NOTE — PROGRESS NOTES
9024. Received patient on full vent support. AC/VC+ RR 15, Vt 450, PEEP 7, FIO2 30%. Current vital: HR 79, Temp 99.3, SpO2 100%, RR 26, /95. Ambu bag and mask at bedside. PSS.

## 2018-05-09 NOTE — WOUND CARE
General Progress Note    Patient: Dorothy Andrew MRN: [id number] LOS: 1     YOB: 1950  Age: 76 y.o. Sex: male        Admit Date: 5/8/2018      Subjective:   Patient intubated on ventilator, chest tube present to left side. JAEL Rangel bedside to assist wound care team.  Patient resistant to turning however, able to turn with assistance for evaluation.        Report to Ariella Hills RN    Objective:     Vitals  Patient Vitals for the past 8 hrs:   BP Temp Pulse Resp SpO2   05/09/18 1200 157/83 (!) 37.4 °F (3 °C) 78 22 99 %   05/09/18 1145 151/80 (!) 37.5 °F (3.1 °C) 78 20 100 %   05/09/18 1130 146/76 (!) 37.6 °F (3.1 °C) 77 21 100 %   05/09/18 1115 152/78 (!) 37.6 °F (3.1 °C) 77 22 99 %   05/09/18 1110 - - 76 18 100 %   05/09/18 1045 (!) 157/131 (!) 37.5 °F (3.1 °C) 82 21 100 %   05/09/18 1030 148/70 (!) 37.5 °F (3.1 °C) 83 20 100 %   05/09/18 1015 146/67 (!) 37.5 °F (3.1 °C) 82 20 100 %   05/09/18 0730 (!) 184/95 99.3 °F (37.4 °C) 80 18 100 %   05/09/18 0714 - - 79 27 100 %   05/09/18 0700 168/87 99.1 °F (37.3 °C) 78 17 100 %   05/09/18 0650 101/67 99.3 °F (37.4 °C) 79 20 99 %   05/09/18 0645 (!) 113/14 99.3 °F (37.4 °C) 75 21 100 %   05/09/18 0630 - 99.3 °F (37.4 °C) 72 19 100 %   05/09/18 0600 - 99.5 °F (37.5 °C) 72 18 100 %   05/09/18 0545 - 99.7 °F (37.6 °C) 81 21 100 %   05/09/18 0530 170/70 99.8 °F (37.7 °C) 78 20 100 %   05/09/18 0515 (!) 161/38 - 76 19 100 %   05/09/18 0500 168/73 - 78 21 100 %       I/O Current Shift       I/O Last Three Shifts  05/07 1901 - 05/09 0700  In: 2472.1 [I.V.:2472.1]  Out: 448 [Urine:448]            Assessment:     Wound Presentation:     05/09/18 1240   Wound Sacral/coccyx   Date First Assessed/Time First Assessed: 05/08/18 1445   POA: Yes  Wound Type: Pressure injury  Location: Sacral/coccyx   DRESSING STATUS Removed   DRESSING TYPE Silicone  (Mepilex)   Pressure Injury Unstageable   Wound Length (cm) 10.3 cm   Wound Width (cm) 7.5 cm   Wound Surface area (cm^2) 77.25 cm^2   Condition of Base Slough;Eschar;Pink   Condition of Edges Closed   Tissue Type Black; Yellow;Pink   Tissue Type Percent Black 40 %   Tissue Type Percent Pink 10   Tissue Type Percent Yellow 50   Drainage Amount  Scant   Drainage Color Serous   Wound Odor None   Periwound Skin Condition Erythema, blanchable   Cleansing and Cleansing Agents  Normal saline   Dressing Type Applied Silicone  (Mepilex)   Procedure Tolerated Well   Wound Scrotum   Date First Assessed/Time First Assessed: 05/08/18 1445   POA: Yes  Wound Type: Pressure injury  Location: Scrotum   DRESSING STATUS Removed   DRESSING TYPE Zinc based paste   Pressure Injury Stage ll   Wound Length (cm) 2.3 cm   Wound Width (cm) 1.6 cm   Wound Depth (cm) 0.2   Wound Surface area (cm^2) 3.68 cm^2   Condition of Base Pink   Condition of Edges Open   Tissue Type Pink   Tissue Type Percent Pink 100   Drainage Amount  Scant   Drainage Color Serous   Wound Odor None   Periwound Skin Condition Intact   Cleansing and Cleansing Agents  Other (comment)  (Bath Wipe)   Dressing Type Applied Zinc based paste   Procedure Tolerated Well   Wound Thigh Right;Posterior   Date First Assessed/Time First Assessed: 05/09/18 0000   POA: Yes  Wound Type: Moisture Assoc.  Skin Damage;Pressure injury  Location: Thigh  Orientation: Right;Posterior   DRESSING STATUS Removed   DRESSING TYPE Silicone  (Mepilex)   Pressure Injury Stage lll   Wound Length (cm) 10 cm   Wound Width (cm) 3.7 cm   Wound Depth (cm) 0.2   Wound Surface area (cm^2) 37 cm^2   Condition of Base Pink   Condition of Edges Open   Tissue Type Pink;Yellow   Tissue Type Percent Pink 90   Tissue Type Percent Yellow 10   Drainage Amount  Scant   Drainage Color Serous   Wound Odor None   Periwound Skin Condition Erythema, blanchable   Cleansing and Cleansing Agents  Normal saline   Dressing Type Applied Silicone  (Mepilex)   Procedure Tolerated Well   Wound Ankle Left;Lateral   Date First Assessed: 05/09/18   POA: Yes Wound Type: Pressure injury  Location: Ankle  Orientation: Left;Lateral   DRESSING TYPE Open to air   Pressure Injury DTI   Wound Length (cm) 2.5 cm   Wound Width (cm) 0.7 cm   Wound Surface area (cm^2) 1.75 cm^2   Condition of Base Other (comment)  (Maroon/Purple)   Condition of Edges Closed   Tissue Type Maroon/purple   Tissue Type Percent Maroon/Purple 100 %   Drainage Amount  None   Wound Odor None   Periwound Skin Condition Intact   Cleansing and Cleansing Agents  Normal saline   Dressing Type Applied Silicone  (Mepilex)   Procedure Tolerated Well                             Principal Problem:    Encephalopathy (5/8/2018)    Active Problems:    Respiratory failure ([address] (5/8/2018)      Dehydration (5/8/2018)      Lactic acidosis (5/8/2018)      Hypernatremia (5/8/2018)      SIMIN (acute kidney injury) ([facility] (5/8/2018)      CVA (cerebral vascular accident) ([facility] [facility] (5/8/2018)      Sepsis ([facility] (5/9/2018)      Elevated troponin (5/9/2018)          Patient turned to Left Side side; bed rails up x3, bed low and locked, lights dimmed for comfort, and call bell within patient's reach. Patient unable to verbalize understanding to any instructions provided will need additional instruction. Plan:      Nursing/[place]:  Please order Envision Wound Surface on Vers[employer] Frame rental bed. Call [facility] at [phone] to obtain PO #, provide [facility] Rental # [id number]. Then Call [facility] at [phone] to order rental bed (notify [facility] of PO # obtained). Linen instructions: One flat sheet and one Covidien Pad. Continue to turn patient every 2 hours and float heels at all times with Prevalon boots. When therapy no longer required, please call [phone] to return rental bed to [facility].       Nursing to Perform Daily  Wound Care/Dressing change to SACRUM: Cleanse wound(s) with normal saline only (do not use dermal wound cleanser due to it inactivates santyl), apply no sting skin prep then zinc oxide cream to periwound skin, apply santyl (nickel-thickness) to entire wound, cover with folded saline-moistened 4x4 and abd pad, secure with hypafix or paper tape. Nursing to Perform Monday Wednesday Friday  Wound Care/Dressing change to Left Lateral Ankle and Right Posterior Thigh:  Cleanse wound with normal saline or dermal wound cleanser,  cover with Mepilex, may secure with hypafix tape. Nursing to Perform Each Shift and PRN  Apply zinc oxide to Left Scrotum after skin cleansing.       Bernard Long RN, [facility]

## 2018-05-09 NOTE — PROGRESS NOTES
2350 received pt from nicu into room 2701 without difficultly. Connected to cardiac monitor , pulse ox, bp cuff, and portable vent with remedios rt.family at bedside given update from [place] chute rn.  2355 assessment completed. Oral and sawant care completed. 0200 resting in bed.  0400 starting to grab with right hand, only noted to wiggle toes slightly on right to touch of right foot. No response from left upper or lower extrmities. 0400 reassessment completed. Oral and sawant care completed. 0401 cxr being completed. 0367 cxr portable - pneumo thorax on left side larger than previous cxr 5 hours ago, no signs of tension pneumo. Read by dr MARTINEZ([id number]). Viktor 68 called back and states apon further exam pneumo looks unchange or only slight bigger, DR Margaret Mina notified, and called Les Cannon. 0530 time out completed. [address] at bedside inserting chest tube left lower lateral position. 0256 cxr completed. 0700 soft wrist restrain applied to right wrist moving arm about bed,does not follow commands or focus on nurse. 0730 echo tech in room. 2139 Bedside shift change report given to Disha Barroso  (oncoming nurse) by Cheryle Keener (offgoing nurse). Report included the following information SBAR, Kardex and Recent Results hob elevated 30 degrees. Call light within reach. Side rails up  X 3. Guillermo Saunas

## 2018-05-09 NOTE — DIABETES MGMT
NUTRITIONAL ASSESSMENT GLYCEMIC CONTROL/ PLAN OF CARE     Joan Amador           76 y.o.           5/8/2018                 1. Acute CVA (cerebrovascular accident) ([facility]    2. Hyponatremia    3. Uremia    4. Acute renal failure, unspecified acute renal failure type ([address]    5. Acute respiratory failure with hypoxia (HCC)    6. Hypothermia, initial encounter    DM     INTERVENTIONS/PLAN:   Recommend  Osmolite 1 neno Tubefeeding at 25ml/hr via PEG. Advance a tolerated to goal rate of 75 ml/hr. This will provide 1908 calories, 80 g protein and 1.6 liters free water/d from TF. Recommend adding Lantus to 6 units q 24 hrs. ASSESSMENT:   Nutritional Status:  Pt with increased calorie and protein requirements related to St 2 and Stage 3 pressure ulcers. Altered nutrition related lab values r/t diabetes AEB HbA1c= 6.5% and 3 BG>200mg/dl. Pt w/hypoalbuminemia as evidenced by albumin=1.5mg/dl. Pt is underweight related to inadequate caloric intake as evidenced by 90% ideal weight and BMI= 21.7 kg/m2 , no record to evaluate for unintentional weight loss. Altered nutrition related lab values r/t SIMIN  AEB BUN= 95 and Cr 2.79. Diabetes Management:   Within target range (non-ICU: <140; ICU<180): [] Yes   [x]  No  Current Insulin regimen: corrective lispro  Home medication/insulin regimen: n/a  HbA1c: 6.5% equivalent  to ave Blood Glucose of 135mg/dl for 2-3 months prior to admission  Adequate glycemic control PTA:  [x] Yes  [] No     SUBJECTIVE/OBJECTIVE:   Information obtained from: ICU rounds  Diet: npo to start TF   Pt has a PEG    No data found.   Medications: [x]                Reviewed   Recent Glucose Results:   Lab Results   Component Value Date/Time     (H) 05/09/2018 03:55 AM    GLUCPOC 262 (H) 05/09/2018 11:05 AM    GLUCPOC 234 (H) 05/09/2018 07:31 AM    GLUCPOC 133 (H) 05/09/2018 12:40 AM         Labs:   Lab Results   Component Value Date/Time    Hemoglobin A1c 6.5 (H) 05/09/2018 03:55 AM     Lab Results   Component Value Date/Time    Sodium 155 (H) 05/09/2018 03:55 AM    Potassium 4.8 05/09/2018 03:55 AM    Chloride 126 (H) 05/09/2018 03:55 AM    CO2 18 (L) 05/09/2018 03:55 AM    Anion gap 11 05/09/2018 03:55 AM    Glucose 209 (H) 05/09/2018 03:55 AM    BUN 95 (H) 05/09/2018 03:55 AM    Creatinine 2.79 (H) 05/09/2018 03:55 AM    Calcium 7.1 (L) 05/09/2018 03:55 AM    Magnesium 3.0 (H) 05/08/2018 11:20 PM    Albumin 1.5 (L) 05/08/2018 02:44 PM       Anthropometrics: IBW : 80.7 kg (178 lb), % IBW (Calculated): 89.89 %, BMI (calculated): 21.7  Wt Readings from Last 1 Encounters:   05/08/18 72.6 kg (160 lb)      Ht Readings from Last 1 Encounters:   05/09/18 6' (1.829 m)       Estimated Nutrition Needs:  2016 Kcals/day, Protein (g): 90 g Fluid (ml): 2000 ml  Based on:   [x]          Actual BW    []          ABW   []            Adjusted BW    Nutrition Diagnoses: As stated above. Nutrition Interventions: TF rec and insulin rec  Goal:   Blood glucose will be within target range of  mg/dL by 5/12/18. Intake will meet >75% of energy and protein requirements by 5/14/18.     Nutrition Monitoring and Evaluation      []     Monitor po intake on meal rounds  [x]     Continue inpatient monitoring and intervention  []     Other:      Nutrition Risk:  [x]   High     []  Moderate    []  Minimal/Uncompromised    Marquise Chaidez, YARIEL  pgr [phone]

## 2018-05-09 NOTE — ANESTHESIA PROCEDURE NOTES
Central Line Placement    Start time: 5/8/2018 10:30 PM  End time: 5/8/2018 10:50 PM  Performed by: Naomie Mclaughlin  Authorized by: Naomie Mclaughlin     Indications: vascular access, need for vasopressors and sepsis protocol  Preanesthetic Checklist: patient identified, site marked, patient being monitored and timeout performed    Timeout Time: 22:51       Pre-procedure: All elements of maximal sterile barrier technique followed? Yes    2% Chlorhexidine for cutaneous antisepsis, Hand hygiene performed prior to catheter insertion and Ultrasound guidance    Sterile Ultrasound Technique followed?: Yes        Ultrasound Image Stored? Image stored    Procedure:   Prep:  Chlorhexidine  Location:  Internal jugular  Orientation:  Left  Patient position:  Trendelenburg  Catheter type:  Triple lumen  Catheter size:  7 Fr  Catheter length:  20 cm and 16 cm  Number of attempts:  1  Successful placement: Yes      Assessment:   Post-procedure:  Catheter secured, sterile dressing applied and sterile dressing with CHG applied  Assessment:  Blood return through all ports, free fluid flow, placement verified by x-ray and guidewire removal verified  Insertion:  Uncomplicated  Patient tolerance:  Patient tolerated the procedure well with no immediate complications  Pt intubated and on ventilator throughout procedure. No complications. Ultrasound used.

## 2018-05-09 NOTE — PROGRESS NOTES
[address] Garvin IN REPORT:    Verbal report received from Rayo Victor RN(name) on Joan Amador  being received from ED (unit) for routine progression of care      Report consisted of patients Situation, Background, Assessment and   Recommendations(SBAR). Information from the following report(s) SBAR, Kardex, ED Summary, Intake/Output, MAR, Recent Results and Med Rec Status was reviewed with the receiving nurse. Opportunity for questions and clarification was provided. Assessment completed upon patients arrival to unit and care assumed. 1900 Pt. Hypotensive. Dr. Thi Delong in the room. See new orders. 1914 Levophed started. 1924 Levophed at ,max rate. Dr. Thi Delong made aware, new order for Janak synephrine was given. Same was started. 1930 Bedside and Verbal shift change report given to  Genny Blanca RN  (oncoming nurse) by Kevin Sosa   (offgoing nurse). Report included the following information SBAR, Kardex, ED Summary, Intake/Output, MAR, Recent Results and Med Rec Status.

## 2018-05-09 NOTE — PROGRESS NOTES
Speech Therapy Note:    SLP acknowledging eval & tx orders; however, pt not appropriate at this time as he is on mechanical ventilation. D/w RN, Ciro Lacy. SLP will d/c orders accordingly. Lexi Miles [facility] [address] [facility], [place]  Office: [phone]  Pager: [phone]

## 2018-05-09 NOTE — PROCEDURES
Pulmonary, Critical Care, and Sleep Medicine     Central Line Procedure with Ultrasound Guidance    Indication: Need for vasopressors    I called pt's daughter twice to get consent, went to voicemail both times. Emergent central line needed as pt is on 2 pressors already and needs a 3rd one    Daughter called later and I asked her about consent for central line. She gave informed consent for the procedure. Patient positioned in Trendelenburg. Timeout called with RN and confirmed patient ID, procedure, site, allergy, consent etc.     Central line Bundle:  Full sterile barrier precautions used. 7-Step Sterility Protocol followed. (cap, mask sterile gown, sterile gloves, large sterile sheet, hand hygiene, 2% chlorhexidine for cutaneous antisepsis)  3 mL 1% Lidocaine placed at insertion site. Using ultrasound guidance, left internal jugular cannulated x 2 attempt(s) utilizing the modified Seldinger technique. Marked difficulty. The vein was cannulated with the needle with good blood return but  unable to pass the wire beyond a certain point. Vein was punctured twice and both times the wire would not pass through. At this time, I stopped the procedure with goal to attempt at a different site. I attempted Rt femoral site. Using ultrasound guidance, right femoral vein cannulated x 2 attempt(s) utilizing the modified Seldinger technique. Unable to get good blood return even though needle was in the vein on ultrasound. I stopped the procedure at this point. I have requested anesthesia for help and they will come and attempt the line. CXR pending.       Sofía Avitia MD   5/8/2018 8:45 PM

## 2018-05-09 NOTE — PROGRESS NOTES
PCCM Progress Note                                              I have reviewed the flowsheet and previous days notes. Events, vitals, medications and notes from last 24 hours reviewed. Care plan discussed with staff and on multidisciplinary rounds. Subjective:  5/9/2018   Pt is on the vent. Opens eyes, moving his right side. This is more than what he was doing last night. Impression and Plan  VDRF - Pt's ABG on the vent looks ok. Cont current settings. Cxr shows ETT is in good position. Pt is not a candidate for weaning yet due to his mental status. Encephalopathy - ? Etiology. Pt's CT head shows poss new CVA. MRI/MRA brain already ordered, but still pending. Tele neurology has been consulted by ER MD.  CVA - Pt has hx of previous CVA. CT head shows poss acute CVA. Pt is scheduled for MRI/MRA brain. He has received ASA. Defer to neurology  Shock - Pt is on levo and Vasopressin. Pt's pressors requirements are coming down. Pt has received adequate fluid resusc. Troponin I is only mildly elevated. Titrate pressors to keep MAP >65mmhg. Echo shows EF 55%. Pt was also started on stress does steroids yesterday. Will taper it in am.   Elevated LA - This is trending down. Continue to follow till its <2  Sepsis - Pt has been empirically started on broad spectrum abx. No inf on Cxr, U/a looks negative, Pt has multiple sores and decubiti which are the likely source. Blood cx is negative so far. Send urine cx. Cont Zosyn, levaquin and vanco. Wound care has been consulted. SIMIN with Hypernatremia - This is slightly better with fluids. Will consult Nephrology. Cont D5-1/2NSS  Left small pneumothorax - CT placed by surgery overnight as pt is on vent. Cxr shows its decreasing in size. Cont with CT to suction. DVT and GI proph - treat with heparin and pepcid  Nutrition - Start tube feeds  Hyperglycemia - Start ISS    OTHER:  Glycemic Control. Glucose stabilizer per ICU protocol when on insulin drip.  Maintain blood glucose 140-180. Replace electrolytes per ICU electrolyte replacement protocol  Ventilator bundle & Sedation protocol followed. Daily morning sedation holiday, assessment for readiness for SBT & weaning from ventilator; and then re-titrate if required. Aim to keep peak plateau pressure 75-19AW H2O in [facility] patient. Jaymie tube to suction at 20-30 cm H2O, Maintain Jaymie tube with 5-10ml air every 4 hours. Chlorhexidine mouth washes and routine oral care every 4 hours. Stress ulcer and DVT prophylaxis. HOB >=30 degree elevation all the time. HOB >=30 degree elevation all the time. Aggressive pulmonary toileting. Incentive spirometry when appropriate. Aspiration precautions. Sepsis bundle and protocol followed. Deescalate antibiotic when appropriate. Vasopressor when appropriate with MAP goal >65 mmHg. Central Line bundle followed, remove when not needed. Large bore IV line or CVP when appropriate. Sawant bundle followed, remove sawant catheter when not critically ill. PT/OT eval and treat. OOB/IS when appropriate. Quality Care: Stress ulcer prophylaxis, DVT prophylaxis, HOB elevated, Infection control all reviewed and addressed. Events and notes from last 24 hours reviewed. Care plan discussed with nursing. D/w patient's family above medical problems and answered all questions to their satisfaction. CC TIME: >45 min     Medication Reviewed:    No Known Allergies   No past medical history on file. No past surgical history on file. Social History   Substance Use Topics    Smoking status: Not on file    Smokeless tobacco: Not on file    Alcohol use Not on file      No family history on file.    Prior to Admission medications    Not on File     Current Facility-Administered Medications   Medication Dose Route Frequency    piperacillin-tazobactam (ZOSYN) 3.375 g in 0.9% sodium chloride (MBP/ADV) 100 mL MBP  #EXTENDED 4-HOUR INFUSION###  3.375 g IntraVENous Q8H    insulin lispro (HUMALOG) injection SubCUTAneous AC&HS    dextrose 5% infusion  125 mL/hr IntraVENous CONTINUOUS    levoFLOXacin (LEVAQUIN) 750 mg in D5W IVPB  750 mg IntraVENous Q48H    VANCOMYCIN INFORMATION NOTE   Other ONCE    Vancomyicn pharmacy dosing per level   Other Rx Dosing/Monitoring    sodium chloride (NS) flush 5-10 mL  5-10 mL IntraVENous Q8H    heparin (porcine) injection 5,000 Units  5,000 Units SubCUTAneous Q8H    aspirin (ASA) suppository 300 mg  300 mg Rectal DAILY    NOREPINephrine (LEVOPHED) 8 mg in 0.9% NS 250ml infusion  2-30 mcg/min IntraVENous TITRATE    famotidine (PF) (PEPCID) 20 mg injection  20 mg IntraVENous Q12H    PHENYLephrine (PF)(CHARLENE-SYNEPHRINE) 30 mg in 0.9% sodium chloride 250 mL infusion   mcg/min IntraVENous TITRATE    vasopressin (VASOSTRICT) 100 Units in 0.9% sodium chloride 100 mL infusion  0-0.04 Units/min IntraVENous TITRATE    hydrocortisone Sod Succ (PF) (SOLU-CORTEF) injection 50 mg  50 mg IntraVENous Q6H       Lines: All central lines examined by me. No signs of erythema, induration, discharge. Central Venous Catheter:  Triple Lumen 05/08/18 Left Internal jugular (Active)   Central Line Being Utilized Yes 5/9/2018  4:00 AM   Criteria for Appropriate Use Hemodynamically unstable, requiring monitoring lines, vasopressors, or volume resuscitation 5/9/2018  4:00 AM   Site Assessment Clean, dry, & intact 5/9/2018  4:00 AM   Infiltration Assessment 0 5/9/2018  4:00 AM   Affected Extremity/Extremities Color distal to insertion site pink (or appropriate for race); Pulses palpable 5/9/2018  4:00 AM   Date of Last Dressing Change 05/09/18 5/9/2018  4:00 AM   Dressing Status Clean, dry, & intact 5/9/2018  4:00 AM   Dressing Type Tape;Disk with Chlorhexadine gluconate (CHG); Transparent 5/9/2018  4:00 AM   Action Taken Open ports on tubing capped 5/9/2018  4:00 AM   Proximal Hub Color/Line Status Brown; Infusing 5/9/2018  4:00 AM   Positive Blood Return (Medial Site) Yes 5/9/2018  4:00 AM Medial Hub Color/Line Status White; Infusing 2018  4:00 AM   Positive Blood Return (Lateral Site) Yes 2018  4:00 AM   Distal Hub Color/Line Status Blue;Capped 2018  4:00 AM   Positive Blood Return (Site #3) Yes 2018  4:00 AM   Alcohol Cap Used Yes 2018  4:00 AM     Peripheral Intravenous Line:  Peripheral IV 18 Left Antecubital (Active)   Site Assessment Clean, dry, & intact 2018  7:00 AM   Phlebitis Assessment 0 2018  7:00 AM   Infiltration Assessment 0 2018  7:00 AM   Dressing Status Clean, dry, & intact 2018  7:00 AM   Dressing Type Tape;Transparent 2018  4:00 AM   Hub Color/Line Status Green; Infusing 2018  4:00 AM   Action Taken Open ports on tubing capped 2018  4:00 AM   Alcohol Cap Used No 2018  4:00 AM   Drain(s):  PEG/Gastrostomy Tube 18 (Active)   Site Assessment Clean, dry, & intact 2018  4:00 AM   Dressing Status Clean, dry, & intact 2018  4:00 AM   G Port Status Clamped 2018  4:00 AM     Airway:  Airway - Endotracheal Tube 18 Oral (Active)   Insertion Depth (cm) 26 cm 2018  7:14 AM   Line Gee Lips 2018  7:14 AM   Side Secured Left 2018  7:14 AM   Cuff Pressure 28 cmH20 2018  7:14 AM   Site Assessment Clean, dry, & intact 2018  7:14 AM       Objective:  Vital Signs:    Visit Vitals    BP (!) 184/95    Pulse 80    Temp 99.3 °F (37.4 °C)    Resp 18    Ht 6' (1.829 m)    Wt 72.6 kg (160 lb)    SpO2 100%    BMI 21.7 kg/m2      O2 Device: Endotracheal tube, Ventilator     Temp (24hrs), Av °F (36.1 °C), Min:94.9 °F (34.9 °C), Max:99.8 °F (37.7 °C)      Intake/Output:   Last shift:           Last 3 shifts: 1901 - 700  In: 2472.1 [I.V.:2472.1]  Out: 448 [Urine:448]      Intake/Output Summary (Last 24 hours) at 18 1030  Last data filed at 18 0700   Gross per 24 hour   Intake          2472.11 ml   Output              448 ml   Net          2024.11 ml       Last 3 Recorded Weights in this Encounter    05/08/18 1420 05/08/18 1747   Weight: 72.6 kg (160 lb) 72.6 kg (160 lb)       Ventilator Settings:  Mode Rate Tidal Volume Pressure FiO2 PEEP  Assist control, VC+   450 ml    30 % 7 cm H20    Peak airway pressure: 17 cm H2O   Plateau pressure:    Tidal volume:   Minute ventilation: 12.1 l/min  SPO2     [facility] network Guidelines: Lung protective strategy and Plateau pressure goals less than or equal to 30    Physical Exam:     General/Neurology: On vent, does not follow voice commands, opens eyes  Head:   Normocephalic, without obvious abnormality  Eye:   PERRL, no scleral icterus, no pallor  Oral:   Mucus membranes moist  Neck:   Supple  Lung:   B/l air entry is decreased  Heart:   Regular rate & rhythm. S1 S2 present. Abdomen/: Soft, non tender, BS +nt  Extremities:  No pedal edema    Data:      Recent Results (from the past 24 hour(s))   TYPE & SCREEN    Collection Time: 05/08/18  2:15 PM   Result Value Ref Range    Crossmatch Expiration 05/11/2018     ABO/Rh(D) A POSITIVE     Antibody screen NEG    CULTURE, BLOOD    Collection Time: 05/08/18  2:30 PM   Result Value Ref Range    Special Requests: NO SPECIAL REQUESTS      Culture result: NO GROWTH AFTER 15 HOURS     POC CHEM8    Collection Time: 05/08/18  2:43 PM   Result Value Ref Range    CO2, POC 25 (H) 19 - 24 MMOL/L    Glucose,  (H) 74 - 106 MG/DL    BUN,  (H) 7 - 18 MG/DL    Creatinine, POC 3.3 (H) 0.6 - 1.3 MG/DL    GFRAA, POC 23 (L) >60 ml/min/1.73m2    GFRNA, POC 19 (L) >60 ml/min/1.73m2    Sodium,  (H) 136 - 145 MMOL/L    Potassium, POC 4.7 3.5 - 5.5 MMOL/L    Calcium, ionized (POC) 1.20 1. 12 - 1.32 mmol/L    Chloride,  (H) 100 - 108 MMOL/L    Anion gap, POC 18 10 - 20      Hematocrit, POC 39 36 - 49 %    Hemoglobin, POC 13.3 12 - 16 G/DL   CBC W/O DIFF    Collection Time: 05/08/18  2:44 PM   Result Value Ref Range    WBC 12.7 4.6 - 13.2 K/uL    RBC 4.10 (L) 4.70 - 5.50 M/uL    HGB 11.7 (L) 13.0 - 16.0 g/dL    HCT 37.0 36.0 - 48.0 %    MCV 90.2 74.0 - 97.0 FL    MCH 28.5 24.0 - 34.0 PG    MCHC 31.6 31.0 - 37.0 g/dL    RDW 15.3 (H) 11.6 - 14.5 %    PLATELET 380 (L) 547 - 420 K/uL    MPV 11.1 9.2 - 45.0 FL   METABOLIC PANEL, COMPREHENSIVE    Collection Time: 05/08/18  2:44 PM   Result Value Ref Range    Sodium 158 (H) 136 - 145 mmol/L    Potassium 4.7 3.5 - 5.5 mmol/L    Chloride 125 (H) 100 - 108 mmol/L    CO2 26 21 - 32 mmol/L    Anion gap 7 3.0 - 18 mmol/L    Glucose 177 (H) 74 - 99 mg/dL     (H) 7.0 - 18 MG/DL    Creatinine 3.56 (H) 0.6 - 1.3 MG/DL    BUN/Creatinine ratio 29 (H) 12 - 20      GFR est AA 21 (L) >60 ml/min/1.73m2    GFR est non-AA 17 (L) >60 ml/min/1.73m2    Calcium 8.5 8.5 - 10.1 MG/DL    Bilirubin, total 0.6 0.2 - 1.0 MG/DL    ALT (SGPT) 42 16 - 61 U/L    AST (SGOT) 72 (H) 15 - 37 U/L    Alk.  phosphatase 104 45 - 117 U/L    Protein, total 6.8 6.4 - 8.2 g/dL    Albumin 1.5 (L) 3.4 - 5.0 g/dL    Globulin 5.3 (H) 2.0 - 4.0 g/dL    A-G Ratio 0.3 (L) 0.8 - 1.7     PROTHROMBIN TIME + INR    Collection Time: 05/08/18  2:44 PM   Result Value Ref Range    Prothrombin time 14.7 11.5 - 15.2 sec    INR 1.2 0.8 - 1.2     THROMBIN TIME    Collection Time: 05/08/18  2:44 PM   Result Value Ref Range    Thrombin time 18.5 (H) 13.8 - 18.2 SECS   FIBRINOGEN    Collection Time: 05/08/18  2:44 PM   Result Value Ref Range    Fibrinogen 441 210 - 451 mg/dL   ASPIRIN TEST    Collection Time: 05/08/18  2:44 PM   Result Value Ref Range    Aspirin test 415 (L) 620 - 672 ARU   POC LACTIC ACID    Collection Time: 05/08/18  2:45 PM   Result Value Ref Range    Lactic Acid (POC) 3.1 (HH) 0.4 - 2.0 mmol/L   URINALYSIS W/ RFLX MICROSCOPIC    Collection Time: 05/08/18  3:00 PM   Result Value Ref Range    Color DARK YELLOW      Appearance CLEAR      Specific gravity 1.023 1.005 - 1.030      pH (UA) 5.0 5.0 - 8.0      Protein 30 (A) NEG mg/dL    Glucose NEGATIVE  NEG mg/dL    Ketone NEGATIVE  NEG mg/dL    Bilirubin SMALL (A) NEG      Blood NEGATIVE  NEG      Urobilinogen 1.0 0.2 - 1.0 EU/dL    Nitrites NEGATIVE  NEG      Leukocyte Esterase TRACE (A) NEG     DRUG SCREEN, URINE    Collection Time: 05/08/18  3:00 PM   Result Value Ref Range    BENZODIAZEPINES NEGATIVE  NEG      BARBITURATES NEGATIVE  NEG      THC (TH-CANNABINOL) NEGATIVE  NEG      OPIATES NEGATIVE  NEG      PCP(PHENCYCLIDINE) NEGATIVE  NEG      COCAINE NEGATIVE  NEG      AMPHETAMINES NEGATIVE  NEG      METHADONE NEGATIVE  NEG      HDSCOM (NOTE)    URINE MICROSCOPIC ONLY    Collection Time: 05/08/18  3:00 PM   Result Value Ref Range    WBC 0 to 2 0 - 4 /hpf    RBC 0 0 - 5 /hpf    Epithelial cells FEW 0 - 5 /lpf    Bacteria NEGATIVE  NEG /hpf   POC G3    Collection Time: 05/08/18  3:02 PM   Result Value Ref Range    Device: VENT      FIO2 (POC) 0.50 %    pH (POC) 7.405 7.35 - 7.45      pCO2 (POC) 35.2 35.0 - 45.0 MMHG    pO2 (POC) 112 (H) 80 - 100 MMHG    HCO3 (POC) 22.5 22 - 26 MMOL/L    sO2 (POC) 99 (H) 92 - 97 %    Base deficit (POC) 3 mmol/L    Mode ASSIST CONTROL      Tidal volume 450 ml    Set Rate 15 bpm    PEEP/CPAP (POC) 7 cmH2O    PIP (POC) 21      Allens test (POC) N/A      Inspiratory Time 0.90 sec    Total resp. rate 15      Site LEFT RADIAL      Patient temp.  94.7      Specimen type (POC) ARTERIAL      Performed by Jaden Dowling     Volume control plus YES     EKG, 12 LEAD, INITIAL    Collection Time: 05/08/18  3:06 PM   Result Value Ref Range    Ventricular Rate 65 BPM    Atrial Rate 65 BPM    P-R Interval 162 ms    QRS Duration 102 ms    Q-T Interval 462 ms    QTC Calculation (Bezet) 480 ms    Calculated P Axis 76 degrees    Calculated R Axis -4 degrees    Calculated T Axis 55 degrees    Diagnosis       Sinus rhythm with occasional premature ventricular complexes  Possible Left atrial enlargement  Septal infarct , age undetermined  Abnormal ECG  No previous ECGs available     TSH 3RD GENERATION    Collection Time: 05/08/18 11:20 PM   Result Value Ref Range    TSH 1.07 0.36 - 3.74 uIU/mL   T4, FREE    Collection Time: 05/08/18 11:20 PM   Result Value Ref Range    T4, Free 1.7 (H) 0.7 - 1.5 NG/DL   MAGNESIUM    Collection Time: 05/08/18 11:20 PM   Result Value Ref Range    Magnesium 3.0 (H) 1.6 - 2.6 mg/dL   LACTIC ACID    Collection Time: 05/08/18 11:20 PM   Result Value Ref Range    Lactic acid 9.5 (HH) 0.4 - 2.0 MMOL/L   CARDIAC PANEL,(CK, CKMB & TROPONIN)    Collection Time: 05/08/18 11:20 PM   Result Value Ref Range     (H) 39 - 308 U/L    CK - MB 10.9 (H) <3.6 ng/ml    CK-MB Index 1.2 0.0 - 4.0 %    Troponin-I, Qt. 0.08 (H) 0.0 - 0.045 NG/ML   GLUCOSE, POC    Collection Time: 05/09/18 12:40 AM   Result Value Ref Range    Glucose (POC) 133 (H) 70 - 201 mg/dL   METABOLIC PANEL, BASIC    Collection Time: 05/09/18  3:55 AM   Result Value Ref Range    Sodium 155 (H) 136 - 145 mmol/L    Potassium 4.8 3.5 - 5.5 mmol/L    Chloride 126 (H) 100 - 108 mmol/L    CO2 18 (L) 21 - 32 mmol/L    Anion gap 11 3.0 - 18 mmol/L    Glucose 209 (H) 74 - 99 mg/dL    BUN 95 (H) 7.0 - 18 MG/DL    Creatinine 2.79 (H) 0.6 - 1.3 MG/DL    BUN/Creatinine ratio 34 (H) 12 - 20      GFR est AA 28 (L) >60 ml/min/1.73m2    GFR est non-AA 23 (L) >60 ml/min/1.73m2    Calcium 7.1 (L) 8.5 - 10.1 MG/DL   CBC WITH AUTOMATED DIFF    Collection Time: 05/09/18  3:55 AM   Result Value Ref Range    WBC 12.8 4.6 - 13.2 K/uL    RBC 3.90 (L) 4.70 - 5.50 M/uL    HGB 10.9 (L) 13.0 - 16.0 g/dL    HCT 35.5 (L) 36.0 - 48.0 %    MCV 91.0 74.0 - 97.0 FL    MCH 27.9 24.0 - 34.0 PG    MCHC 30.7 (L) 31.0 - 37.0 g/dL    RDW 15.8 (H) 11.6 - 14.5 %    PLATELET 652 (L) 409 - 420 K/uL    MPV 11.4 9.2 - 11.8 FL    NEUTROPHILS 89 (H) 40 - 73 %    LYMPHOCYTES 7 (L) 21 - 52 %    MONOCYTES 4 3 - 10 %    EOSINOPHILS 0 0 - 5 %    BASOPHILS 0 0 - 2 %    ABS. NEUTROPHILS 11.5 (H) 1.8 - 8.0 K/UL    ABS. LYMPHOCYTES 0.8 (L) 0.9 - 3.6 K/UL    ABS. MONOCYTES 0.5 0.05 - 1.2 K/UL    ABS. EOSINOPHILS 0.0 0.0 - 0.4 K/UL    ABS. BASOPHILS 0.0 0.0 - 0.06 K/UL    DF AUTOMATED     CARDIAC PANEL,(CK, CKMB & TROPONIN)    Collection Time: 05/09/18  3:55 AM   Result Value Ref Range     (H) 39 - 308 U/L    CK - MB 7.6 (H) <3.6 ng/ml    CK-MB Index 1.2 0.0 - 4.0 %    Troponin-I, Qt. 0.05 (H) 0.0 - 0.045 NG/ML   LACTIC ACID    Collection Time: 05/09/18  3:55 AM   Result Value Ref Range    Lactic acid 2.4 (HH) 0.4 - 2.0 MMOL/L   POC G3    Collection Time: 05/09/18  7:12 AM   Result Value Ref Range    Device: VENT      FIO2 (POC) 0.35 %    pH (POC) 7.361 7.35 - 7.45      pCO2 (POC) 31.6 (L) 35.0 - 45.0 MMHG    pO2 (POC) 168 (H) 80 - 100 MMHG    HCO3 (POC) 17.8 (L) 22 - 26 MMOL/L    sO2 (POC) 99 (H) 92 - 97 %    Base deficit (POC) 7 mmol/L    Mode ASSIST CONTROL      Tidal volume 450 ml    Set Rate 15 bpm    PEEP/CPAP (POC) 7 cmH2O    PIP (POC) 18      Allens test (POC) N/A      Inspiratory Time 0.90 sec    Total resp. rate 25      Site LEFT RADIAL      Patient temp.  37.5      Specimen type (POC) ARTERIAL      Performed by Lazarus Salters     Volume control plus YES     GLUCOSE, POC    Collection Time: 05/09/18  7:31 AM   Result Value Ref Range    Glucose (POC) 234 (H) 70 - 110 mg/dL   LACTIC ACID    Collection Time: 05/09/18  8:10 AM   Result Value Ref Range    Lactic acid 2.2 (HH) 0.4 - 2.0 MMOL/L         Chemistry Recent Labs      05/09/18   0355  05/08/18   2320  05/08/18   1444   GLU  209*   --   177*   NA  155*   --   158*   K  4.8   --   4.7   CL  126*   --   125*   CO2  18*   --   26   BUN  95*   --   105*   CREA  2.79*   --   3.56*   CA  7.1*   --   8.5   MG   --   3.0*   --    AGAP  11   --   7   BUCR  34*   --   29*   AP   --    --   104   TP   --    --   6.8   ALB   --    --   1.5*   GLOB   --    --   5.3*   AGRAT   --    --   0.3*       CBC w/Diff Recent Labs      05/09/18   0355  05/08/18   1444   WBC  12.8  12.7   RBC  3.90*  4.10*   HGB  10.9*  11.7*   HCT  35.5*  37.0   PLT  128*  133*   GRANS  89*   --    LYMPH  7*   --    EOS 0   --        ABG  Recent Labs      05/09/18   0712  05/08/18   1502   PHI  7.361  7.405   PCO2I  31.6*  35.2   PO2I  168*  112*   HCO3I  17.8*  22.5   FIO2I  0.35  0.50       Micro     Recent Labs      05/08/18   1430   CULT  NO GROWTH AFTER 15 HOURS     Recent Labs      05/08/18   1430   CULT  NO GROWTH AFTER 15 HOURS       CT (Most Recent)    Results from [place] encounter on 05/08/18   CT HEAD WO CONT   Narrative EXAM: CT head    INDICATION: Unresponsive    COMPARISON: No prior comparison study. TECHNIQUE: Axial CT imaging of the head was performed without intravenous  contrast.  Additional coronal and sagittal reconstructions were performed. One  or more dose reduction techniques were used on this CT: automated exposure  control, adjustment of the mAs and/or kVp according to patient's size, and  iterative reconstruction techniques. The specific techniques utilized on this CT  exam have been documented in the patient's electronic medical record.  _______________    FINDINGS:    VENTRICLES/EXTRA-AXIAL SPACES: The ventricles and sulci are normal in their size  and configuration. BRAIN PARENCHYMA: There is a large confluent area of abnormal parenchymal  hypodensity involving much of the right cerebral hemisphere involving both  cortex and white matter. There is involvement of right occipital lobe extending  into the parietal lobe and perhaps portion of the posterior temporal lobe. Some  of this area has suggestion of cortical volume loss, although not diffusely. There may be mild amount of adjacent sulcal effacement in the parietal region. More clearly delineated and cephalized changes are seen more anteriorly in the  right frontotemporal region with cortical atrophy and volume loss and underlying  hypodensity.  Elongated hypodensity is seen involving the lateral aspect of the  right basal ganglia involving external capsule, portion of the lentiform  nucleus, and extending into the adjacent corona radiata suggesting chronic  infarct. Discrete small area of encephalomalacia seen in the contralateral posterior left  frontal cortex towards the vertex, such as axial image 26 suggesting small  chronic cortical infarct. Elongated hypodensity is seen involving the right  central diana suggesting axonal degeneration from chronic infarct. There is no evidence of acute intracranial hemorrhage, midline shift, or  herniation. ORBITS: The visualized orbits are unremarkable. PARANASAL SINUSES/MASTOIDS: Visualized paranasal sinuses are clear. Visualized  mastoid air cells are clear. OSSEOUS STRUCTURES: No fracture is seen. OTHER: None.      _______________         Impression IMPRESSION:    1. No acute intracranial hemorrhage, midline shift, or herniation. 2. Right frontotemporal and lateral basal ganglia encephalomalacic changes most  likely from chronic infarct. Additional small chronic posterior left frontal  cortical infarct was the vertex. 3. Right parieto-occipital parenchymal hypodensity very suspicious for infarct,  although more age indeterminate, suspect portions acute/subacute in age,  although there may be small areas of more chronic infarct present. No prior  comparison study. Please note that noncontrast head CT may be normal in early  acute infarct. 4. Mild nonspecific white matter disease likely representing chronic small  vessel changes. CRITICAL RESULT:    These critical results on this CODE S patient were directly communicated to Dr. Marj Diaz at 2:21 PM on 5/8/2018. Results understood and acknowledged. XR (Most Recent). CXR reviewed by me and compared with previous CXR   Results from Hospital Encounter encounter on 05/08/18   XR CHEST PORT   Narrative Portable chest 5/9/2018. History: Pneumothorax with interval chest tube placement. Technique: A semierect portable radiograph of the chest was obtained. Comparison:  5/9/2018. Findings:  There has been interval placement of a left-sided chest tube. The  endotracheal tube and left internal jugular central line are unchanged. The  cardiomediastinal contours are unchanged. The left pneumothorax has decreased  in size with only a small residual left apical pneumothorax. Right basilar  pleural thickening and/or trace effusion persists with minimal atelectasis or  scarring at the right lung base. There are no new areas of parenchymal  consolidation or collapse. There is no right pneumothorax. Impression IMPRESSION:    1. Decreasing left pneumothorax status post chest tube placement. 2.  Otherwise, no significant interval change. High complexity decision making was performed during the evaluation of this patient at high risk for decompensation with multiple organ involvement     Above mentioned total time spent on reviewing the case/medical record/data/notes/EMR/patient examination/documentation/coordinating care with nurse/consultants, exclusive of procedures with complex decision making performed and > 50% time spent in face to face evaluation.     Jonel Perkins MD  5/9/2018
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
## 2018-05-09 NOTE — PROGRESS NOTES
Physical Exam   Musculoskeletal: Normal range of motion. Skin:         Primary Nurse Mago Simms, RN and Deena kramer rn, RN performed a dual skin assessment on this patient .

## 2018-05-09 NOTE — CONSULTS
I was called early in the morning because the patient has a pneumothorax that has increased on a chest x-ray. It seems that twice and iatrogenic pneumothorax after placement of a central line. Since the patient has an increase in the size of pneumothorax I decided to do a chest tube urgently. An attempt to get a consent from the daughter was performed on the phone. I left her a message. We will perform chest tube placement in the ICU for a left pneumothorax. I reviewed the chest x-ray myself.

## 2018-05-09 NOTE — PROGRESS NOTES
VENTILATOR Care plan    Problem: Ventilator Management  Goal: *Adequate oxygenation/ ventilation/ and extubation      Patient:        Larisa Mcgill     76 y.o.   male     5/9/2018  8:48 AM  Patient Active Problem List   Diagnosis Code    [facility] J96.90    Encephalopathy G93.40    Dehydration E86.0    Lactic acidosis E87.2    Hypernatremia E87.0    SIMIN (acute kidney injury) ([facility] N17.9    CVA (cerebral vascular accident) ([facility] I63.9    Sepsis (HCC) A41.9       Encephalopathy  Respiratory failure (HCC)  Encephalopathy  Respiratory failure (HCC)  Hypernatremia  SIMIN (acute kidney injury) ([facility]  Dehydration  Lactic acidosis    Reason patient intubated: Maintain airway protection. Patient found unresponsive. Ventilator day: 2    Ventilator settings: ACVC+, RATE 15, , PEEP 7, FiO2 30%    ETT Size/Placement: 7mm, 26 @ lip       ABG:  Date:5/9/2018  Lab Results   Component Value Date/Time    PHI 7.361 05/09/2018 07:12 AM    PCO2I 31.6 (L) 05/09/2018 07:12 AM    PO2I 168 (H) 05/09/2018 07:12 AM    HCO3I 17.8 (L) 05/09/2018 07:12 AM    FIO2I 0.35 05/09/2018 07:12 AM       Chest X-ray:  Date:5/9/2018    Results from Hospital Encounter encounter on 05/08/18   XR CHEST PORT   Narrative Portable chest 5/9/2018. History: Pneumothorax with interval chest tube placement. Technique: A semierect portable radiograph of the chest was obtained. Comparison:  5/9/2018. Findings: There has been interval placement of a left-sided chest tube. The  endotracheal tube and left internal jugular central line are unchanged. The  cardiomediastinal contours are unchanged. The left pneumothorax has decreased  in size with only a small residual left apical pneumothorax. Right basilar  pleural thickening and/or trace effusion persists with minimal atelectasis or  scarring at the right lung base. There are no new areas of parenchymal  consolidation or collapse. There is no right pneumothorax. Impression IMPRESSION:    1. Decreasing left pneumothorax status post chest tube placement. 2.  Otherwise, no significant interval change.         Lab Test:  Date:5/9/2018  WBC:   Lab Results   Component Value Date/Time    WBC 12.8 05/09/2018 03:55 AM   HGB: Lab Results   Component Value Date/Time    HGB 10.9 (L) 05/09/2018 03:55 AM    PLTS: Lab Results   Component Value Date/Time    PLATELET 172 (L) 05/57/3776 03:55 AM       SaO2%/flow:   SpO2 Readings from Last 1 Encounters:   05/09/18 100%       Vital Signs:   Patient Vitals for the past 8 hrs:   Temp Pulse Resp BP SpO2   05/09/18 0730 (!) 37.4 °F (3 °C) 80 18 (!) 184/95 100 %   05/09/18 0714 - 79 27 - 100 %   05/09/18 0700 (!) 37.3 °F (2.9 °C) 78 17 168/87 100 %   05/09/18 0650 (!) 37.3 °F (2.9 °C) 79 20 101/67 99 %   05/09/18 0645 (!) 37.4 °F (3 °C) 75 21 (!) 113/14 100 %   05/09/18 0630 (!) 37.4 °F (3 °C) 72 19 - 100 %   05/09/18 0600 (!) 37.5 °F (3.1 °C) 72 18 - 100 %   05/09/18 0545 (!) 37.6 °F (3.1 °C) 81 21 - 100 %   05/09/18 0530 (!) 37.7 °F (3.2 °C) 78 20 170/70 100 %   05/09/18 0515 - 76 19 (!) 161/38 100 %   05/09/18 0500 - 78 21 168/73 100 %   05/09/18 0445 - 77 24 - 99 %   05/09/18 0430 - 78 23 154/89 99 %   05/09/18 0415 - 77 21 142/83 -   05/09/18 0400 - 78 22 - 93 %   05/09/18 0345 - 78 20 166/65 100 %   05/09/18 0330 - 78 23 166/77 100 %   05/09/18 0300 (!) 37.5 °F (3.1 °C) 78 21 158/70 100 %   05/09/18 0230 (!) 37.6 °F (3.1 °C) 78 21 135/72 100 %   05/09/18 0200 (!) 37.6 °F (3.1 °C) 79 19 151/60 -   05/09/18 0145 (!) 37.6 °F (3.1 °C) 80 15 138/55 -   05/09/18 0120 - 82 20 - 100 %   05/09/18 0115 (!) 37.4 °F (3 °C) 82 (!) 0 95/62 -   05/09/18 0100 (!) 37.4 °F (3 °C) 84 20 155/59 -       Wean Screen Pass (Yes or No): No    Wean Screen Reason for Failure: Patient on pressors    Duration of Weaning Trial: N/A    Additional Comments: AM ABG: pH 7.36/ pCO2 32/ pO2 168/ BE -7/ HCO3 18/ sO2 99%      PLAN OF CARE: Decrease PEEP to 5, per Dr. Carly Valdivia GOAL: Oxgenation/Ventilation/Maintain airway

## 2018-05-10 ENCOUNTER — APPOINTMENT (OUTPATIENT)
Dept: GENERAL RADIOLOGY | Age: 68
DRG: 871 | End: 2018-05-10
Attending: SURGERY
Payer: MEDICARE

## 2018-05-10 ENCOUNTER — APPOINTMENT (OUTPATIENT)
Dept: ULTRASOUND IMAGING | Age: 68
DRG: 871 | End: 2018-05-10
Attending: INTERNAL MEDICINE
Payer: MEDICARE

## 2018-05-10 ENCOUNTER — APPOINTMENT (OUTPATIENT)
Dept: GENERAL RADIOLOGY | Age: 68
DRG: 871 | End: 2018-05-10
Attending: INTERNAL MEDICINE
Payer: MEDICARE

## 2018-05-10 PROBLEM — R78.81 BACTEREMIA: Status: ACTIVE | Noted: 2018-05-10

## 2018-05-10 LAB
ALBUMIN SERPL-MCNC: 1.3 G/DL (ref 3.4–5)
ALBUMIN/GLOB SERPL: 0.3 {RATIO} (ref 0.8–1.7)
ALP SERPL-CCNC: 76 U/L (ref 45–117)
ALT SERPL-CCNC: 29 U/L (ref 16–61)
ANION GAP SERPL CALC-SCNC: 10 MMOL/L (ref 3–18)
APPEARANCE UR: ABNORMAL
ARTERIAL PATENCY WRIST A: ABNORMAL
ARTERIAL PATENCY WRIST A: ABNORMAL
AST SERPL-CCNC: 38 U/L (ref 15–37)
BACTERIA URNS QL MICRO: ABNORMAL /HPF
BASE DEFICIT BLD-SCNC: 7 MMOL/L
BASE DEFICIT BLD-SCNC: 9 MMOL/L
BASOPHILS # BLD: 0 K/UL (ref 0–0.06)
BASOPHILS NFR BLD: 0 % (ref 0–2)
BDY SITE: ABNORMAL
BDY SITE: ABNORMAL
BILIRUB SERPL-MCNC: 0.4 MG/DL (ref 0.2–1)
BILIRUB UR QL: NEGATIVE
BODY TEMPERATURE: 35.8
BODY TEMPERATURE: 36.1
BUN SERPL-MCNC: 97 MG/DL (ref 7–18)
BUN/CREAT SERPL: 34 (ref 12–20)
CALCIUM SERPL-MCNC: 7.6 MG/DL (ref 8.5–10.1)
CHLORIDE SERPL-SCNC: 126 MMOL/L (ref 100–108)
CK MB CFR SERPL CALC: 0.9 % (ref 0–4)
CK MB CFR SERPL CALC: 1 % (ref 0–4)
CK MB CFR SERPL CALC: 1 % (ref 0–4)
CK MB SERPL-MCNC: 2.8 NG/ML (ref 5–25)
CK MB SERPL-MCNC: 2.9 NG/ML (ref 5–25)
CK MB SERPL-MCNC: 3.4 NG/ML (ref 5–25)
CK SERPL-CCNC: 276 U/L (ref 39–308)
CK SERPL-CCNC: 292 U/L (ref 39–308)
CK SERPL-CCNC: 306 U/L (ref 39–308)
CK SERPL-CCNC: 341 U/L (ref 39–308)
CO2 SERPL-SCNC: 20 MMOL/L (ref 21–32)
COLOR UR: YELLOW
CREAT SERPL-MCNC: 2.86 MG/DL (ref 0.6–1.3)
DIFFERENTIAL METHOD BLD: ABNORMAL
EOSINOPHIL # BLD: 0 K/UL (ref 0–0.4)
EOSINOPHIL NFR BLD: 0 % (ref 0–5)
EPITH CASTS URNS QL MICRO: ABNORMAL /LPF (ref 0–5)
ERYTHROCYTE [DISTWIDTH] IN BLOOD BY AUTOMATED COUNT: 15.7 % (ref 11.6–14.5)
GAS FLOW.O2 O2 DELIVERY SYS: ABNORMAL L/MIN
GAS FLOW.O2 O2 DELIVERY SYS: ABNORMAL L/MIN
GAS FLOW.O2 SETTING OXYMISER: 15 BPM
GLOBULIN SER CALC-MCNC: 3.9 G/DL (ref 2–4)
GLUCOSE BLD STRIP.AUTO-MCNC: 151 MG/DL (ref 70–110)
GLUCOSE BLD STRIP.AUTO-MCNC: 172 MG/DL (ref 70–110)
GLUCOSE BLD STRIP.AUTO-MCNC: 209 MG/DL (ref 70–110)
GLUCOSE BLD STRIP.AUTO-MCNC: 217 MG/DL (ref 70–110)
GLUCOSE BLD STRIP.AUTO-MCNC: 60 MG/DL (ref 70–110)
GLUCOSE BLD STRIP.AUTO-MCNC: 70 MG/DL (ref 70–110)
GLUCOSE SERPL-MCNC: 181 MG/DL (ref 74–99)
GLUCOSE UR STRIP.AUTO-MCNC: 250 MG/DL
HCO3 BLD-SCNC: 17.4 MMOL/L (ref 22–26)
HCO3 BLD-SCNC: 18.5 MMOL/L (ref 22–26)
HCT VFR BLD AUTO: 29.1 % (ref 36–48)
HGB BLD-MCNC: 9.1 G/DL (ref 13–16)
HGB UR QL STRIP: ABNORMAL
INSPIRATION.DURATION SETTING TIME VENT: 0.9 SEC
KETONES UR QL STRIP.AUTO: NEGATIVE MG/DL
LACTATE SERPL-SCNC: 1.9 MMOL/L (ref 0.4–2)
LACTATE SERPL-SCNC: 2 MMOL/L (ref 0.4–2)
LACTATE SERPL-SCNC: 2 MMOL/L (ref 0.4–2)
LACTATE SERPL-SCNC: 2.2 MMOL/L (ref 0.4–2)
LACTATE SERPL-SCNC: 2.2 MMOL/L (ref 0.4–2)
LEUKOCYTE ESTERASE UR QL STRIP.AUTO: ABNORMAL
LYMPHOCYTES # BLD: 1.2 K/UL (ref 0.9–3.6)
LYMPHOCYTES NFR BLD: 12 % (ref 21–52)
MAGNESIUM SERPL-MCNC: 2.6 MG/DL (ref 1.6–2.6)
MCH RBC QN AUTO: 28.1 PG (ref 24–34)
MCHC RBC AUTO-ENTMCNC: 31.3 G/DL (ref 31–37)
MCV RBC AUTO: 89.8 FL (ref 74–97)
MONOCYTES # BLD: 0.3 K/UL (ref 0.05–1.2)
MONOCYTES NFR BLD: 3 % (ref 3–10)
NEUTS SEG # BLD: 8.8 K/UL (ref 1.8–8)
NEUTS SEG NFR BLD: 85 % (ref 40–73)
NITRITE UR QL STRIP.AUTO: NEGATIVE
O2/TOTAL GAS SETTING VFR VENT: 0.3 %
O2/TOTAL GAS SETTING VFR VENT: 30 %
PCO2 BLD: 31.9 MMHG (ref 35–45)
PCO2 BLD: 32 MMHG (ref 35–45)
PEEP RESPIRATORY: 5 CMH2O
PEEP RESPIRATORY: 5 CMH2O
PH BLD: 7.34 [PH] (ref 7.35–7.45)
PH BLD: 7.36 [PH] (ref 7.35–7.45)
PH UR STRIP: 5 [PH] (ref 5–8)
PHOSPHATE SERPL-MCNC: 4.4 MG/DL (ref 2.5–4.9)
PIP ISTAT,IPIP: 12
PLATELET # BLD AUTO: 119 K/UL (ref 135–420)
PMV BLD AUTO: 11.4 FL (ref 9.2–11.8)
PO2 BLD: 80 MMHG (ref 80–100)
PO2 BLD: 89 MMHG (ref 80–100)
POTASSIUM SERPL-SCNC: 4.1 MMOL/L (ref 3.5–5.5)
PRESSURE SUPPORT SETTING VENT: 7 CMH2O
PROT SERPL-MCNC: 5.2 G/DL (ref 6.4–8.2)
PROT UR STRIP-MCNC: 30 MG/DL
RBC # BLD AUTO: 3.24 M/UL (ref 4.7–5.5)
RBC #/AREA URNS HPF: ABNORMAL /HPF (ref 0–5)
SAO2 % BLD: 96 % (ref 92–97)
SAO2 % BLD: 97 % (ref 92–97)
SERVICE CMNT-IMP: ABNORMAL
SERVICE CMNT-IMP: ABNORMAL
SODIUM SERPL-SCNC: 152 MMOL/L (ref 136–145)
SODIUM SERPL-SCNC: 156 MMOL/L (ref 136–145)
SODIUM SERPL-SCNC: 157 MMOL/L (ref 136–145)
SODIUM SERPL-SCNC: 157 MMOL/L (ref 136–145)
SP GR UR REFRACTOMETRY: 1.03 (ref 1–1.03)
SPECIMEN TYPE: ABNORMAL
SPECIMEN TYPE: ABNORMAL
TOTAL RESP. RATE, ITRR: 14
TOTAL RESP. RATE, ITRR: 20
TROPONIN I SERPL-MCNC: 0.04 NG/ML (ref 0–0.04)
TROPONIN I SERPL-MCNC: 0.05 NG/ML (ref 0–0.04)
TROPONIN I SERPL-MCNC: 0.05 NG/ML (ref 0–0.04)
UROBILINOGEN UR QL STRIP.AUTO: 1 EU/DL (ref 0.2–1)
VANCOMYCIN SERPL-MCNC: 21.8 UG/ML (ref 5–40)
VANCOMYCIN SERPL-MCNC: 28 UG/ML (ref 5–40)
VENTILATION MODE VENT: ABNORMAL
VENTILATION MODE VENT: ABNORMAL
VOLUME CONTROL PLUS IVLCP: YES
VT SETTING VENT: 450 ML
WBC # BLD AUTO: 10.3 K/UL (ref 4.6–13.2)
WBC URNS QL MICRO: ABNORMAL /HPF (ref 0–4)

## 2018-05-10 PROCEDURE — 71045 X-RAY EXAM CHEST 1 VIEW: CPT

## 2018-05-10 PROCEDURE — 0W9B30Z DRAINAGE OF LEFT PLEURAL CAVITY WITH DRAINAGE DEVICE, PERCUTANEOUS APPROACH: ICD-10-PCS | Performed by: SURGERY

## 2018-05-10 PROCEDURE — 74011000250 HC RX REV CODE- 250: Performed by: FAMILY MEDICINE

## 2018-05-10 PROCEDURE — 84295 ASSAY OF SERUM SODIUM: CPT | Performed by: FAMILY MEDICINE

## 2018-05-10 PROCEDURE — 36415 COLL VENOUS BLD VENIPUNCTURE: CPT | Performed by: FAMILY MEDICINE

## 2018-05-10 PROCEDURE — 83605 ASSAY OF LACTIC ACID: CPT | Performed by: FAMILY MEDICINE

## 2018-05-10 PROCEDURE — 76775 US EXAM ABDO BACK WALL LIM: CPT

## 2018-05-10 PROCEDURE — 36591 DRAW BLOOD OFF VENOUS DEVICE: CPT

## 2018-05-10 PROCEDURE — 82962 GLUCOSE BLOOD TEST: CPT

## 2018-05-10 PROCEDURE — 94003 VENT MGMT INPAT SUBQ DAY: CPT

## 2018-05-10 PROCEDURE — 74011000258 HC RX REV CODE- 258: Performed by: FAMILY MEDICINE

## 2018-05-10 PROCEDURE — 74011000250 HC RX REV CODE- 250: Performed by: INTERNAL MEDICINE

## 2018-05-10 PROCEDURE — 65610000006 HC RM INTENSIVE CARE

## 2018-05-10 PROCEDURE — 85025 COMPLETE CBC W/AUTO DIFF WBC: CPT | Performed by: FAMILY MEDICINE

## 2018-05-10 PROCEDURE — 83735 ASSAY OF MAGNESIUM: CPT | Performed by: FAMILY MEDICINE

## 2018-05-10 PROCEDURE — 36592 COLLECT BLOOD FROM PICC: CPT

## 2018-05-10 PROCEDURE — 74011636637 HC RX REV CODE- 636/637: Performed by: FAMILY MEDICINE

## 2018-05-10 PROCEDURE — 74011250637 HC RX REV CODE- 250/637: Performed by: FAMILY MEDICINE

## 2018-05-10 PROCEDURE — 74011250636 HC RX REV CODE- 250/636: Performed by: INTERNAL MEDICINE

## 2018-05-10 PROCEDURE — 36600 WITHDRAWAL OF ARTERIAL BLOOD: CPT

## 2018-05-10 PROCEDURE — 81001 URINALYSIS AUTO W/SCOPE: CPT | Performed by: INTERNAL MEDICINE

## 2018-05-10 PROCEDURE — 82803 BLOOD GASES ANY COMBINATION: CPT

## 2018-05-10 PROCEDURE — 82553 CREATINE MB FRACTION: CPT | Performed by: FAMILY MEDICINE

## 2018-05-10 PROCEDURE — 80202 ASSAY OF VANCOMYCIN: CPT | Performed by: FAMILY MEDICINE

## 2018-05-10 PROCEDURE — 84100 ASSAY OF PHOSPHORUS: CPT | Performed by: FAMILY MEDICINE

## 2018-05-10 PROCEDURE — 80053 COMPREHEN METABOLIC PANEL: CPT | Performed by: FAMILY MEDICINE

## 2018-05-10 PROCEDURE — 74011250636 HC RX REV CODE- 250/636: Performed by: FAMILY MEDICINE

## 2018-05-10 RX ORDER — INSULIN GLARGINE 100 [IU]/ML
8 INJECTION, SOLUTION SUBCUTANEOUS DAILY
Status: DISCONTINUED | OUTPATIENT
Start: 2018-05-11 | End: 2018-05-10

## 2018-05-10 RX ORDER — HYDROCORTISONE SODIUM SUCCINATE 100 MG/2ML
50 INJECTION, POWDER, FOR SOLUTION INTRAMUSCULAR; INTRAVENOUS EVERY 12 HOURS
Status: DISCONTINUED | OUTPATIENT
Start: 2018-05-10 | End: 2018-05-13

## 2018-05-10 RX ORDER — INSULIN GLARGINE 100 [IU]/ML
5 INJECTION, SOLUTION SUBCUTANEOUS DAILY
Status: DISCONTINUED | OUTPATIENT
Start: 2018-05-11 | End: 2018-05-10

## 2018-05-10 RX ADMIN — HYDROCORTISONE SODIUM SUCCINATE 50 MG: 100 INJECTION, POWDER, FOR SOLUTION INTRAMUSCULAR; INTRAVENOUS at 20:30

## 2018-05-10 RX ADMIN — PIPERACILLIN SODIUM,TAZOBACTAM SODIUM 3.38 G: 3; .375 INJECTION, POWDER, FOR SOLUTION INTRAVENOUS at 10:30

## 2018-05-10 RX ADMIN — INSULIN LISPRO 4 UNITS: 100 INJECTION, SOLUTION INTRAVENOUS; SUBCUTANEOUS at 23:51

## 2018-05-10 RX ADMIN — HEPARIN SODIUM 5000 UNITS: 5000 INJECTION, SOLUTION INTRAVENOUS; SUBCUTANEOUS at 18:25

## 2018-05-10 RX ADMIN — HEPARIN SODIUM 5000 UNITS: 5000 INJECTION, SOLUTION INTRAVENOUS; SUBCUTANEOUS at 02:35

## 2018-05-10 RX ADMIN — Medication 10 ML: at 05:11

## 2018-05-10 RX ADMIN — FAMOTIDINE 20 MG: 10 INJECTION INTRAVENOUS at 09:10

## 2018-05-10 RX ADMIN — Medication 10 ML: at 17:16

## 2018-05-10 RX ADMIN — SODIUM CHLORIDE 1000 MG: 900 INJECTION, SOLUTION INTRAVENOUS at 14:41

## 2018-05-10 RX ADMIN — HEPARIN SODIUM 5000 UNITS: 5000 INJECTION, SOLUTION INTRAVENOUS; SUBCUTANEOUS at 10:31

## 2018-05-10 RX ADMIN — DEXTROSE MONOHYDRATE 12.5 G: 25 INJECTION, SOLUTION INTRAVENOUS at 13:16

## 2018-05-10 RX ADMIN — Medication 20 ML: at 00:18

## 2018-05-10 RX ADMIN — INSULIN LISPRO 3 UNITS: 100 INJECTION, SOLUTION INTRAVENOUS; SUBCUTANEOUS at 00:25

## 2018-05-10 RX ADMIN — HYDROCORTISONE SODIUM SUCCINATE 50 MG: 100 INJECTION, POWDER, FOR SOLUTION INTRAMUSCULAR; INTRAVENOUS at 05:11

## 2018-05-10 RX ADMIN — PIPERACILLIN SODIUM,TAZOBACTAM SODIUM 3.38 G: 3; .375 INJECTION, POWDER, FOR SOLUTION INTRAVENOUS at 17:17

## 2018-05-10 RX ADMIN — INSULIN GLARGINE 6 UNITS: 100 INJECTION, SOLUTION SUBCUTANEOUS at 09:10

## 2018-05-10 RX ADMIN — INSULIN LISPRO 3 UNITS: 100 INJECTION, SOLUTION INTRAVENOUS; SUBCUTANEOUS at 06:48

## 2018-05-10 RX ADMIN — COLLAGENASE SANTYL: 250 OINTMENT TOPICAL at 10:30

## 2018-05-10 RX ADMIN — PIPERACILLIN SODIUM,TAZOBACTAM SODIUM 3.38 G: 3; .375 INJECTION, POWDER, FOR SOLUTION INTRAVENOUS at 00:30

## 2018-05-10 RX ADMIN — HYDROCORTISONE SODIUM SUCCINATE 50 MG: 100 INJECTION, POWDER, FOR SOLUTION INTRAMUSCULAR; INTRAVENOUS at 10:31

## 2018-05-10 RX ADMIN — ASPIRIN 300 MG: 300 SUPPOSITORY RECTAL at 09:09

## 2018-05-10 RX ADMIN — Medication 10 ML: at 21:32

## 2018-05-10 NOTE — PROGRESS NOTES
Physical Exam   Genitourinary:         Skin:         Primary Nurse Genevieve Lagunas RN and Carmen Hollins RN performed a dual skin assessment on this patient .

## 2018-05-10 NOTE — PROGRESS NOTES
0800 Received patient on full vent support ACVC+, RATE 15, , PEEP 5, FiO2 30%. Initiated SBT @ [id number], Dr. Maren Caro notified and aware. Patient seems more awake and alert today. No resp distress noted at this time. Ambu and mask at bedside.  -PSS

## 2018-05-10 NOTE — PROGRESS NOTES
Date: 5/10/2018  Provider: Kamini Multani M.D. Neurology      ACTIVE PROBLEMS:  Dehydration , renal failure        Subjective:    Objective:      MRI reviewed: It appears the patient's stroke was not acute and his deterioration was due to dehydration/renal failure/other metabolic factors. On examination he is fully alert, using right arm and leg well. Plan:    DC neuro checks    Will be available to see again as needed.                  Kamini Multani M.D.

## 2018-05-10 NOTE — PROGRESS NOTES
PCCM Progress Note                                              I have reviewed the flowsheet and previous days notes. Events, vitals, medications and notes from last 24 hours reviewed. Care plan discussed with staff and on multidisciplinary rounds. Subjective:  5/10/2018   Pt is on the vent. Opens eyes, moving his right side. He is responding to questions today which is an improvement    Impression and Plan  VDRF - ABG today looks ok. Pt started on SBT today with PS trial. Doing well on it. Will get an ABg after 2 hrs and if it looks good plan to extubate  Encephalopathy - Improving. F/u with neurology   CVA - Pt has hx of previous CVA. MRI brain on 5/9/18 shows subacute and chronic infarcts involving the right cerebral hemisphere, Small chronic infarct left frontal lobe. MRA brain shows No gross abnormality. Defer to Neurology  Shock - Improving. Pt is off pressors now. Taper hydrocortisone   Elevated LA - This is trending down. Continue to follow till its <2  Sepsis - WBC count is lower today, in the normal range. Blood cx shows GPC. Pt is on zosyn, levaquin and Vanco. Stop levaquin  SIMIN - This is unchanged to slightly worse from yesterday. Nephrology has been consulted, defer to them  Hypernatremia - Pt is on D5W. Neurology recs to maintain mild hyponatremia, will follow their advice. Monitor sodium Q12hr  Left small pneumothorax - CT placed by surgery. Defer to them  DVT and GI proph - treat with heparin and pepcid  Nutrition - Cont tube feeds  Hyperglycemia - ISS and lantus. Nutrition consulted    OTHER:  Glycemic Control. Glucose stabilizer per ICU protocol when on insulin drip. Maintain blood glucose 140-180. Replace electrolytes per ICU electrolyte replacement protocol  Ventilator bundle & Sedation protocol followed. Daily morning sedation holiday, assessment for readiness for SBT & weaning from ventilator; and then re-titrate if required. Aim to keep peak plateau pressure 28-26NM H2O in AR[facility] patient. [place] tube to suction at 20-30 cm H2O, Maintain Jaymie tube with 5-10ml air every 4 hours. Chlorhexidine mouth washes and routine oral care every 4 hours. Stress ulcer and DVT prophylaxis. HOB >=30 degree elevation all the time. HOB >=30 degree elevation all the time. Aggressive pulmonary toileting. Incentive spirometry when appropriate. Aspiration precautions. Sepsis bundle and protocol followed. Deescalate antibiotic when appropriate. Vasopressor when appropriate with MAP goal >65 mmHg. Central Line bundle followed, remove when not needed. Large bore IV line or CVP when appropriate. Sawant bundle followed, remove sawant catheter when not critically ill. PT/OT eval and treat. OOB/IS when appropriate. Quality Care: Stress ulcer prophylaxis, DVT prophylaxis, HOB elevated, Infection control all reviewed and addressed. Events and notes from last 24 hours reviewed. Care plan discussed with nursing. D/w patient's family above medical problems and answered all questions to their satisfaction. CC TIME: >45 min     Medication Reviewed:    No Known Allergies   No past medical history on file. No past surgical history on file. Social History   Substance Use Topics    Smoking status: Not on file    Smokeless tobacco: Not on file    Alcohol use Not on file      No family history on file.    Prior to Admission medications    Not on File     Current Facility-Administered Medications   Medication Dose Route Frequency    VANCOMYCIN INFORMATION NOTE   Other ONCE    piperacillin-tazobactam (ZOSYN) 3.375 g in 0.9% sodium chloride (MBP/ADV) 100 mL MBP  #EXTENDED 4-HOUR INFUSION###  3.375 g IntraVENous Q8H    dextrose 5% infusion  75 mL/hr IntraVENous CONTINUOUS    collagenase (SANTYL) 250 unit/gram ointment   Topical DAILY    insulin glargine (LANTUS) injection 6 Units  6 Units SubCUTAneous DAILY    insulin lispro (HUMALOG) injection   SubCUTAneous Q6H    levoFLOXacin (LEVAQUIN) 750 mg in D5W IVPB  750 mg IntraVENous Q48H    Vancomyicn pharmacy dosing per level   Other Rx Dosing/Monitoring    sodium chloride (NS) flush 5-10 mL  5-10 mL IntraVENous Q8H    heparin (porcine) injection 5,000 Units  5,000 Units SubCUTAneous Q8H    aspirin (ASA) suppository 300 mg  300 mg Rectal DAILY    NOREPINephrine (LEVOPHED) 8 mg in 0.9% NS 250ml infusion  2-30 mcg/min IntraVENous TITRATE    famotidine (PF) (PEPCID) 20 mg injection  20 mg IntraVENous Q12H    PHENYLephrine (PF)(CHARLENE-SYNEPHRINE) 30 mg in 0.9% sodium chloride 250 mL infusion   mcg/min IntraVENous TITRATE    vasopressin (VASOSTRICT) 100 Units in 0.9% sodium chloride 100 mL infusion  0-0.04 Units/min IntraVENous TITRATE    hydrocortisone Sod Succ (PF) (SOLU-CORTEF) injection 50 mg  50 mg IntraVENous Q6H       Lines: All central lines examined by me. No signs of erythema, induration, discharge. Central Venous Catheter:  Triple Lumen 05/08/18 Left Internal jugular (Active)   Central Line Being Utilized Yes 5/9/2018  4:00 AM   Criteria for Appropriate Use Hemodynamically unstable, requiring monitoring lines, vasopressors, or volume resuscitation 5/9/2018  4:00 AM   Site Assessment Clean, dry, & intact 5/9/2018  4:00 AM   Infiltration Assessment 0 5/9/2018  4:00 AM   Affected Extremity/Extremities Color distal to insertion site pink (or appropriate for race); Pulses palpable 5/9/2018  4:00 AM   Date of Last Dressing Change 05/09/18 5/9/2018  4:00 AM   Dressing Status Clean, dry, & intact 5/9/2018  4:00 AM   Dressing Type Tape;Disk with Chlorhexadine gluconate (CHG); Transparent 5/9/2018  4:00 AM   Action Taken Open ports on tubing capped 5/9/2018  4:00 AM   Proximal Hub Color/Line Status Brown; Infusing 5/9/2018  4:00 AM   Positive Blood Return (Medial Site) Yes 5/9/2018  4:00 AM   Medial Hub Color/Line Status White; Infusing 5/9/2018  4:00 AM   Positive Blood Return (Lateral Site) Yes 5/9/2018  4:00 AM   Distal Hub Color/Line Status Blue;Capped 5/9/2018  4:00 AM Positive Blood Return (Site #3) Yes 2018  4:00 AM   Alcohol Cap Used Yes 2018  4:00 AM     Peripheral Intravenous Line:  Peripheral IV 18 Left Antecubital (Active)   Site Assessment Clean, dry, & intact 2018  7:00 AM   Phlebitis Assessment 0 2018  7:00 AM   Infiltration Assessment 0 2018  7:00 AM   Dressing Status Clean, dry, & intact 2018  7:00 AM   Dressing Type Tape;Transparent 2018  4:00 AM   Hub Color/Line Status Green; Infusing 2018  4:00 AM   Action Taken Open ports on tubing capped 2018  4:00 AM   Alcohol Cap Used No 2018  4:00 AM   Drain(s):  PEG/Gastrostomy Tube 18 (Active)   Site Assessment Clean, dry, & intact 2018  4:00 AM   Dressing Status Clean, dry, & intact 2018  4:00 AM   G Port Status Clamped 2018  4:00 AM     Airway:  Airway - Endotracheal Tube 18 Oral (Active)   Insertion Depth (cm) 26 cm 2018  7:14 AM   Line Gee Lips 2018  7:14 AM   Side Secured Left 2018  7:14 AM   Cuff Pressure 28 cmH20 2018  7:14 AM   Site Assessment Clean, dry, & intact 2018  7:14 AM       Objective:  Vital Signs:    Visit Vitals    /57    Pulse 76    Temp (!) 36.1 °F (2.3 °C)    Resp 17    Ht 6' (1.829 m)    Wt 75.2 kg (165 lb 12.8 oz)    SpO2 100%    BMI 22.49 kg/m2      O2 Device: Endotracheal tube, Ventilator     Temp (24hrs), Av.8 °F (23.8 °C), Min:35.8 °F (2.1 °C), Max:99.7 °F (37.6 °C)      Intake/Output:   Last shift:           Last 3 shifts:  1901 - 05/10 0700  In: 5108.7 [I.V.:4658.7]  Out: 4313 [Urine:1193]      Intake/Output Summary (Last 24 hours) at 05/10/18 0918  Last data filed at 05/10/18 0647   Gross per 24 hour   Intake          2610.85 ml   Output              690 ml   Net          1920.85 ml       Last 3 Recorded Weights in this Encounter    18 1420 18 1747 18   Weight: 72.6 kg (160 lb) 72.6 kg (160 lb) 75.2 kg (165 lb 12.8 oz)       Ventilator Settings:  Mode Rate Tidal Volume Pressure FiO2 PEEP  Spontaneous, Pressure support   450 ml  7 cm H2O 30 % 5 cm H20    Peak airway pressure: 12 cm H2O   Plateau pressure:    Tidal volume:   Minute ventilation: 8.59 l/min  SPO2     ARDS network Guidelines: Lung protective strategy and Plateau pressure goals less than or equal to 30    Physical Exam:     General/Neurology: On vent, follow voice commands, opens eyes  Head:   Normocephalic, without obvious abnormality  Eye:   PERRL, no scleral icterus, no pallor  Oral:   Mucus membranes moist  Neck:   Supple  Lung:   B/l air entry is decreased  Heart:   Regular rate & rhythm. S1 S2 present.    Abdomen/: Soft, non tender, BS +nt  Extremities:  No pedal edema    Data:      Recent Results (from the past 24 hour(s))   CARDIAC PANEL,(CK, CKMB & TROPONIN)    Collection Time: 05/09/18  9:45 AM   Result Value Ref Range     (H) 39 - 308 U/L    CK - MB 5.4 (H) <3.6 ng/ml    CK-MB Index 1.1 0.0 - 4.0 %    Troponin-I, Qt. 0.05 (H) 0.0 - 0.045 NG/ML   AMMONIA    Collection Time: 05/09/18  9:45 AM   Result Value Ref Range    Ammonia 21 11 - 32 UMOL/L   GLUCOSE, POC    Collection Time: 05/09/18 11:05 AM   Result Value Ref Range    Glucose (POC) 262 (H) 70 - 110 mg/dL   LACTIC ACID    Collection Time: 05/09/18 12:30 PM   Result Value Ref Range    Lactic acid 2.2 (HH) 0.4 - 2.0 MMOL/L   VANCOMYCIN, RANDOM    Collection Time: 05/09/18  2:40 PM   Result Value Ref Range    Vancomycin, random 14.8 5.0 - 40.0 UG/ML   SODIUM    Collection Time: 05/09/18  2:40 PM   Result Value Ref Range    Sodium 156 (H) 136 - 145 mmol/L   GLUCOSE, POC    Collection Time: 05/09/18  5:51 PM   Result Value Ref Range    Glucose (POC) 155 (H) 70 - 110 mg/dL   SODIUM    Collection Time: 05/09/18  6:16 PM   Result Value Ref Range    Sodium 154 (H) 136 - 145 mmol/L   CARDIAC PANEL,(CK, CKMB & TROPONIN)    Collection Time: 05/09/18  6:16 PM   Result Value Ref Range     (H) 39 - 308 U/L    CK - MB 3.8 (H) <3.6 ng/ml    CK-MB Index 0.9 0.0 - 4.0 %    Troponin-I, Qt. 0.06 (H) 0.0 - 0.045 NG/ML   LACTIC ACID    Collection Time: 05/09/18  6:16 PM   Result Value Ref Range    Lactic acid 2.4 (HH) 0.4 - 2.0 MMOL/L   LACTIC ACID    Collection Time: 05/09/18 10:24 PM   Result Value Ref Range    Lactic acid 2.1 (HH) 0.4 - 2.0 MMOL/L   SODIUM, UR, RANDOM    Collection Time: 05/09/18 10:26 PM   Result Value Ref Range    Sodium urine, random 14 (L) 20 - 110 MMOL/L   CREATININE, UR, RANDOM    Collection Time: 05/09/18 10:26 PM   Result Value Ref Range    Creatinine, urine 123.00 30 - 125 mg/dL   SODIUM    Collection Time: 05/10/18 12:06 AM   Result Value Ref Range    Sodium 157 (H) 136 - 145 mmol/L   CARDIAC PANEL,(CK, CKMB & TROPONIN)    Collection Time: 05/10/18 12:06 AM   Result Value Ref Range     (H) 39 - 308 U/L    CK - MB 3.4 <3.6 ng/ml    CK-MB Index 1.0 0.0 - 4.0 %    Troponin-I, Qt. 0.05 (H) 0.0 - 0.045 NG/ML   GLUCOSE, POC    Collection Time: 05/10/18 12:21 AM   Result Value Ref Range    Glucose (POC) 151 (H) 70 - 110 mg/dL   CBC WITH AUTOMATED DIFF    Collection Time: 05/10/18  2:25 AM   Result Value Ref Range    WBC 10.3 4.6 - 13.2 K/uL    RBC 3.24 (L) 4.70 - 5.50 M/uL    HGB 9.1 (L) 13.0 - 16.0 g/dL    HCT 29.1 (L) 36.0 - 48.0 %    MCV 89.8 74.0 - 97.0 FL    MCH 28.1 24.0 - 34.0 PG    MCHC 31.3 31.0 - 37.0 g/dL    RDW 15.7 (H) 11.6 - 14.5 %    PLATELET 287 (L) 111 - 420 K/uL    MPV 11.4 9.2 - 11.8 FL    NEUTROPHILS 85 (H) 40 - 73 %    LYMPHOCYTES 12 (L) 21 - 52 %    MONOCYTES 3 3 - 10 %    EOSINOPHILS 0 0 - 5 %    BASOPHILS 0 0 - 2 %    ABS. NEUTROPHILS 8.8 (H) 1.8 - 8.0 K/UL    ABS. LYMPHOCYTES 1.2 0.9 - 3.6 K/UL    ABS. MONOCYTES 0.3 0.05 - 1.2 K/UL    ABS. EOSINOPHILS 0.0 0.0 - 0.4 K/UL    ABS.  BASOPHILS 0.0 0.0 - 0.06 K/UL    DF AUTOMATED     METABOLIC PANEL, COMPREHENSIVE    Collection Time: 05/10/18  2:25 AM   Result Value Ref Range    Sodium 156 (H) 136 - 145 mmol/L    Potassium 4.1 3.5 - 5.5 mmol/L Chloride 126 (H) 100 - 108 mmol/L    CO2 20 (L) 21 - 32 mmol/L    Anion gap 10 3.0 - 18 mmol/L    Glucose 181 (H) 74 - 99 mg/dL    BUN 97 (H) 7.0 - 18 MG/DL    Creatinine 2.86 (H) 0.6 - 1.3 MG/DL    BUN/Creatinine ratio 34 (H) 12 - 20      GFR est AA 27 (L) >60 ml/min/1.73m2    GFR est non-AA 22 (L) >60 ml/min/1.73m2    Calcium 7.6 (L) 8.5 - 10.1 MG/DL    Bilirubin, total 0.4 0.2 - 1.0 MG/DL    ALT (SGPT) 29 16 - 61 U/L    AST (SGOT) 38 (H) 15 - 37 U/L    Alk.  phosphatase 76 45 - 117 U/L    Protein, total 5.2 (L) 6.4 - 8.2 g/dL    Albumin 1.3 (L) 3.4 - 5.0 g/dL    Globulin 3.9 2.0 - 4.0 g/dL    A-G Ratio 0.3 (L) 0.8 - 1.7     MAGNESIUM    Collection Time: 05/10/18  2:25 AM   Result Value Ref Range    Magnesium 2.6 1.6 - 2.6 mg/dL   PHOSPHORUS    Collection Time: 05/10/18  2:25 AM   Result Value Ref Range    Phosphorus 4.4 2.5 - 4.9 MG/DL   VANCOMYCIN, RANDOM    Collection Time: 05/10/18  2:25 AM   Result Value Ref Range    Vancomycin, random 28.0 5.0 - 40.0 UG/ML   LACTIC ACID    Collection Time: 05/10/18  2:25 AM   Result Value Ref Range    Lactic acid 2.0 0.4 - 2.0 MMOL/L   CARDIAC PANEL,(CK, CKMB & TROPONIN)    Collection Time: 05/10/18  2:25 AM   Result Value Ref Range     39 - 308 U/L    CK - MB 2.9 <3.6 ng/ml    CK-MB Index 0.9 0.0 - 4.0 %    Troponin-I, Qt. 0.05 (H) 0.0 - 0.045 NG/ML   GLUCOSE, POC    Collection Time: 05/10/18  6:09 AM   Result Value Ref Range    Glucose (POC) 172 (H) 70 - 110 mg/dL   LACTIC ACID    Collection Time: 05/10/18  6:10 AM   Result Value Ref Range    Lactic acid 2.2 (HH) 0.4 - 2.0 MMOL/L   POC G3    Collection Time: 05/10/18  6:16 AM   Result Value Ref Range    Device: VENT      FIO2 (POC) 30 %    pH (POC) 7.364 7.35 - 7.45      pCO2 (POC) 32.0 (L) 35.0 - 45.0 MMHG    pO2 (POC) 89 80 - 100 MMHG    HCO3 (POC) 18.5 (L) 22 - 26 MMOL/L    sO2 (POC) 97 92 - 97 %    Base deficit (POC) 7 mmol/L    Mode ASSIST CONTROL      Tidal volume 450 ml    Set Rate 15 bpm    PEEP/CPAP (POC) 5. 0 cmH2O    PIP (POC) 12      Allens test (POC) N/A      Inspiratory Time 0.90 sec    Total resp. rate 20      Site RIGHT RADIAL      Patient temp. 35.8      Specimen type (POC) ARTERIAL      Performed by Michel Ca     Volume control plus YES           Chemistry   Recent Labs      05/10/18   0225  05/10/18   0006  05/09/18   1816   05/09/18   0355 05/08/18   2320  05/08/18   1444   GLU  181*   --    --    --   209*   --   177*   NA  156*  157*  154*   < >  155*   --   158*   K  4.1   --    --    --   4.8   --   4.7   CL  126*   --    --    --   126*   --   125*   CO2  20*   --    --    --   18*   --   26   BUN  97*   --    --    --   95*   --   105*   CREA  2.86*   --    --    --   2.79*   --   3.56*   CA  7.6*   --    --    --   7.1*   --   8.5   MG  2.6   --    --    --    --   3.0*   --    PHOS  4.4   --    --    --    --    --    --    AGAP  10   --    --    --   11   --   7   BUCR  34*   --    --    --   34*   --   29*   AP  76   --    --    --    --    --   104   TP  5.2*   --    --    --    --    --   6.8   ALB  1.3*   --    --    --    --    --   1.5*   GLOB  3.9   --    --    --    --    --   5.3*   AGRAT  0.3*   --    --    --    --    --   0.3*    < > = values in this interval not displayed.        CBC w/Diff   Recent Labs      05/10/18   0225  05/09/18   0355  05/08/18   1444   WBC  10.3  12.8  12.7   RBC  3.24*  3.90*  4.10*   HGB  9.1*  10.9*  11.7*   HCT  29.1*  35.5*  37.0   PLT  119*  128*  133*   GRANS  85*  89*   --    LYMPH  12*  7*   --    EOS  0  0   --        ABG    Recent Labs      05/10/18   0616  05/09/18   0712  05/08/18   1502   PHI  7.364  7.361  7.405   PCO2I  32.0*  31.6*  35.2   PO2I  89  168*  112*   HCO3I  18.5*  17.8*  22.5   FIO2I  30  0.35  0.50       Micro     Recent Labs      05/08/18   1430   CULT  CULTURE IN PROGRESS,FURTHER UPDATES TO FOLLOW Sent to [facility] for ID/Susceptibility if indicated  CULTURE IN PROGRESS,FURTHER UPDATES TO FOLLOW  Sent to [facility] for ID/Susceptibility if indicated     Recent Labs      05/08/18   1430   CULT  CULTURE IN PROGRESS,FURTHER UPDATES TO FOLLOW Sent to [facility] for ID/Susceptibility if indicated  CULTURE IN Joss Lehman UPDATES TO FOLLOW  Sent to [facility] for ID/Susceptibility if indicated       CT (Most Recent)      Results from Hospital Encounter encounter on 05/08/18   CT HEAD WO CONT   Narrative EXAM: CT head    INDICATION: Unresponsive    COMPARISON: No prior comparison study. TECHNIQUE: Axial CT imaging of the head was performed without intravenous  contrast.  Additional coronal and sagittal reconstructions were performed. One  or more dose reduction techniques were used on this CT: automated exposure  control, adjustment of the mAs and/or kVp according to patient's size, and  iterative reconstruction techniques. The specific techniques utilized on this CT  exam have been documented in the patient's electronic medical record.  _______________    FINDINGS:    VENTRICLES/EXTRA-AXIAL SPACES: The ventricles and sulci are normal in their size  and configuration. BRAIN PARENCHYMA: There is a large confluent area of abnormal parenchymal  hypodensity involving much of the right cerebral hemisphere involving both  cortex and white matter. There is involvement of right occipital lobe extending  into the parietal lobe and perhaps portion of the posterior temporal lobe. Some  of this area has suggestion of cortical volume loss, although not diffusely. There may be mild amount of adjacent sulcal effacement in the parietal region. More clearly delineated and cephalized changes are seen more anteriorly in the  right frontotemporal region with cortical atrophy and volume loss and underlying  hypodensity. Elongated hypodensity is seen involving the lateral aspect of the  right basal ganglia involving external capsule, portion of the lentiform  nucleus, and extending into the adjacent corona radiata suggesting chronic  infarct.     Discrete small area of encephalomalacia seen in the contralateral posterior left  frontal cortex towards the vertex, such as axial image 26 suggesting small  chronic cortical infarct. Elongated hypodensity is seen involving the right  central diana suggesting axonal degeneration from chronic infarct. There is no evidence of acute intracranial hemorrhage, midline shift, or  herniation. ORBITS: The visualized orbits are unremarkable. PARANASAL SINUSES/MASTOIDS: Visualized paranasal sinuses are clear. Visualized  mastoid air cells are clear. OSSEOUS STRUCTURES: No fracture is seen. OTHER: None.      _______________         Impression IMPRESSION:    1. No acute intracranial hemorrhage, midline shift, or herniation. 2. Right frontotemporal and lateral basal ganglia encephalomalacic changes most  likely from chronic infarct. Additional small chronic posterior left frontal  cortical infarct was the vertex. 3. Right parieto-occipital parenchymal hypodensity very suspicious for infarct,  although more age indeterminate, suspect portions acute/subacute in age,  although there may be small areas of more chronic infarct present. No prior  comparison study. Please note that noncontrast head CT may be normal in early  acute infarct. 4. Mild nonspecific white matter disease likely representing chronic small  vessel changes. CRITICAL RESULT:    These critical results on this CODE S patient were directly communicated to Dr. Aliya Maradiaga at 2:21 PM on 5/8/2018. Results understood and acknowledged. XR (Most Recent). CXR reviewed by me and compared with previous CXR     Results from Hospital Encounter encounter on 05/08/18   XR CHEST PORT   Narrative Portable chest 5/9/2018. History: Pneumothorax with interval chest tube placement. Technique: A semierect portable radiograph of the chest was obtained. Comparison:  5/9/2018. Findings: There has been interval placement of a left-sided chest tube. The  endotracheal tube and left internal jugular central line are unchanged. The  cardiomediastinal contours are unchanged. The left pneumothorax has decreased  in size with only a small residual left apical pneumothorax. Right basilar  pleural thickening and/or trace effusion persists with minimal atelectasis or  scarring at the right lung base. There are no new areas of parenchymal  consolidation or collapse. There is no right pneumothorax. Impression IMPRESSION:    1. Decreasing left pneumothorax status post chest tube placement. 2.  Otherwise, no significant interval change. High complexity decision making was performed during the evaluation of this patient at high risk for decompensation with multiple organ involvement     Above mentioned total time spent on reviewing the case/medical record/data/notes/EMR/patient examination/documentation/coordinating care with nurse/consultants, exclusive of procedures with complex decision making performed and > 50% time spent in face to face evaluation.     Marisa Gomez MD  5/10/2018

## 2018-05-10 NOTE — PROGRESS NOTES
1 [facility] is alert, stable and intubated. No signs of distress or pain. Will continue to monitor. 2000 - Pt tube feeding out. Called supervisor to bring more. 2100 - Tube feeding brought up and tubing due to be changed now.  5/11/18  0000 - Pt resting quietly and stable. No signs of pain or distress. Will continue to monitor. 0200 - Pt bathed. Helped with turning with right hand. Seemed to understand what was going on.   0400 - Pt resting quietly and stable. No signs of pain or distress. Will continue to monitor.

## 2018-05-10 NOTE — PROGRESS NOTES
Pharmacy adjusting dose for Famotidine (Pepcid) per renal protocol. Was on a regimen of: 20 mg IV q12h. Labs:     CrCl - 26.3 ml/min    Plan:  Changed Famotidine to 20 mg IV q24h. Pharmacy to follow and will redose based on renal function changes. Thanks.

## 2018-05-10 NOTE — PROGRESS NOTES
Physical Exam   Genitourinary:         Musculoskeletal:        Feet:    Skin:        dual skin assessment done with Salima Baeza

## 2018-05-10 NOTE — H&P
Patient seen and examined.  Full consult note Dictated #[id number]  Briefly:  SIMIN on CKD3 , baseline is unknown now with worsening renal failure and azotemia  D/D for SIMIN long include ATN vs pre-renal etiology will continue with conservative mangement  Get a renal US  Labs reviewed no need for HD today    Hypernatremia: secondary to free water deficit, will continue with d5w at 75ml/hr    Thank you for consult

## 2018-05-10 NOTE — PROGRESS NOTES
Working toward extubation. Discharge plan is to return to [address] [place] [address].   Toby Carlisle RN

## 2018-05-10 NOTE — PROGRESS NOTES
Progress Note      Patient: Mirela Houston               Sex: male          DOA: 5/8/2018       YOB: 1950      Age:  76 y.o.        LOS:  LOS: 2 days               Subjective:   Paula Noland a 76 y. o. male who presents with via EMS unresponsive . Per report he is resident at [address] with hx stroke with Left hemiparesis and G tube. He was sating 40's in the field and was intubated by EMS PTA. In the ED code S was called. Seen by tele neurology. CT head was suspect for acute/subacute infarct. Patient MRI brain pending . Patient will be admitted to ICU for further stroke work up. Patient was also noted to be dehydrated , hypernatremic and acute renal failure . He was given bolus IVF 30 ml/kg stat by ED and started on antibiotics for possible sepsis . Patient was noted to have pneumothorax post intubation. Surgery called for emergent chest tube. Objective:      Visit Vitals    /57    Pulse 76    Temp (!) 36.1 °F (2.3 °C)    Resp 17    Ht 6' (1.829 m)    Wt 75.2 kg (165 lb 12.8 oz)    SpO2 100%    BMI 22.49 kg/m2       Physical Exam   Constitutional: He is intubated. HENT:   Head: Normocephalic. Eyes: No scleral icterus. Cardiovascular: Normal rate, regular rhythm and normal heart sounds. No murmur heard. Pulmonary/Chest: Effort normal and breath sounds normal. He is intubated. Abdominal: Soft. Bowel sounds are normal.   Musculoskeletal: He exhibits no edema. Neurological: He is unresponsive. Skin: Skin is warm. No rash noted. No erythema. No pallor.    Psychiatric:   unresponsive        Intake and Output:  Current Shift:     Last three shifts:  05/08 1901 - 05/10 0700  In: 5108.7 [I.V.:4658.7]  Out: 1193 [Urine:1193]    Recent Results (from the past 48 hour(s))   TYPE & SCREEN    Collection Time: 05/08/18  2:15 PM   Result Value Ref Range    Crossmatch Expiration 05/11/2018     ABO/Rh(D) A POSITIVE     Antibody screen NEG    CULTURE, BLOOD Collection Time: 05/08/18  2:30 PM   Result Value Ref Range    Special Requests: NO SPECIAL REQUESTS      GRAM STAIN GRAM POSITIVE COCCI IN CLUSTERS ANAEROBIC BOTTLE      GRAM STAIN        SMEAR CALLED TO AND CORRECTLY REPEATED BY: JAEL Patel AT [id number] [phone] TO MARCIO    GRAM STAIN GRAM POSITIVE COCCI IN CLUSTERS AEROBIC BOTTLE      GRAM STAIN        SMEAR CALLED TO AND CORRECTLY REPEATED BY: JAEL Wade AT 2023 [phone] TO MARCIO    Culture result:        CULTURE IN [address] UPDATES TO FOLLOW Sent to [facility] for ID/Susceptibility if indicated    Culture result: CULTURE IN [address] UPDATES TO FOLLOW      Culture result: Sent to [facility] for ID/Susceptibility if indicated     POC CHEM8    Collection Time: 05/08/18  2:43 PM   Result Value Ref Range    CO2, POC 25 (H) 19 - 24 MMOL/L    Glucose,  (H) 74 - 106 MG/DL    BUN,  (H) 7 - 18 MG/DL    Creatinine, POC 3.3 (H) 0.6 - 1.3 MG/DL    GFRAA, POC 23 (L) >60 ml/min/1.73m2    GFRNA, POC 19 (L) >60 ml/min/1.73m2    Sodium,  (H) 136 - 145 MMOL/L    Potassium, POC 4.7 3.5 - 5.5 MMOL/L    Calcium, ionized (POC) 1.20 1. 12 - 1.32 mmol/L    Chloride,  (H) 100 - 108 MMOL/L    Anion gap, POC 18 10 - 20      Hematocrit, POC 39 36 - 49 %    Hemoglobin, POC 13.3 12 - 16 G/DL   CBC W/O DIFF    Collection Time: 05/08/18  2:44 PM   Result Value Ref Range    WBC 12.7 4.6 - 13.2 K/uL    RBC 4.10 (L) 4.70 - 5.50 M/uL    HGB 11.7 (L) 13.0 - 16.0 g/dL    HCT 37.0 36.0 - 48.0 %    MCV 90.2 74.0 - 97.0 FL    MCH 28.5 24.0 - 34.0 PG    MCHC 31.6 31.0 - 37.0 g/dL    RDW 15.3 (H) 11.6 - 14.5 %    PLATELET 299 (L) 140 - 420 K/uL    MPV 11.1 9.2 - 13.2 FL   METABOLIC PANEL, COMPREHENSIVE    Collection Time: 05/08/18  2:44 PM   Result Value Ref Range    Sodium 158 (H) 136 - 145 mmol/L    Potassium 4.7 3.5 - 5.5 mmol/L    Chloride 125 (H) 100 - 108 mmol/L    CO2 26 21 - 32 mmol/L    Anion gap 7 3.0 - 18 mmol/L    Glucose 177 (H) 74 - 99 mg/dL     (H) 7.0 - 18 MG/DL    Creatinine 3.56 (H) 0.6 - 1.3 MG/DL    BUN/Creatinine ratio 29 (H) 12 - 20      GFR est AA 21 (L) >60 ml/min/1.73m2    GFR est non-AA 17 (L) >60 ml/min/1.73m2    Calcium 8.5 8.5 - 10.1 MG/DL    Bilirubin, total 0.6 0.2 - 1.0 MG/DL    ALT (SGPT) 42 16 - 61 U/L    AST (SGOT) 72 (H) 15 - 37 U/L    Alk.  phosphatase 104 45 - 117 U/L    Protein, total 6.8 6.4 - 8.2 g/dL    Albumin 1.5 (L) 3.4 - 5.0 g/dL    Globulin 5.3 (H) 2.0 - 4.0 g/dL    A-G Ratio 0.3 (L) 0.8 - 1.7     PROTHROMBIN TIME + INR    Collection Time: 05/08/18  2:44 PM   Result Value Ref Range    Prothrombin time 14.7 11.5 - 15.2 sec    INR 1.2 0.8 - 1.2     THROMBIN TIME    Collection Time: 05/08/18  2:44 PM   Result Value Ref Range    Thrombin time 18.5 (H) 13.8 - 18.2 SECS   FIBRINOGEN    Collection Time: 05/08/18  2:44 PM   Result Value Ref Range    Fibrinogen 441 210 - 451 mg/dL   ASPIRIN TEST    Collection Time: 05/08/18  2:44 PM   Result Value Ref Range    Aspirin test 415 (L) 620 - 672 ARU   POC LACTIC ACID    Collection Time: 05/08/18  2:45 PM   Result Value Ref Range    Lactic Acid (POC) 3.1 (HH) 0.4 - 2.0 mmol/L   URINALYSIS W/ RFLX MICROSCOPIC    Collection Time: 05/08/18  3:00 PM   Result Value Ref Range    Color DARK YELLOW      Appearance CLEAR      Specific gravity 1.023 1.005 - 1.030      pH (UA) 5.0 5.0 - 8.0      Protein 30 (A) NEG mg/dL    Glucose NEGATIVE  NEG mg/dL    Ketone NEGATIVE  NEG mg/dL    Bilirubin SMALL (A) NEG      Blood NEGATIVE  NEG      Urobilinogen 1.0 0.2 - 1.0 EU/dL    Nitrites NEGATIVE  NEG      Leukocyte Esterase TRACE (A) NEG     DRUG SCREEN, URINE    Collection Time: 05/08/18  3:00 PM   Result Value Ref Range    BENZODIAZEPINES NEGATIVE  NEG      BARBITURATES NEGATIVE  NEG      THC (TH-CANNABINOL) NEGATIVE  NEG      OPIATES NEGATIVE  NEG      PCP(PHENCYCLIDINE) NEGATIVE  NEG      COCAINE NEGATIVE  NEG      AMPHETAMINES NEGATIVE  NEG      METHADONE NEGATIVE  NEG      HDSCOM (NOTE)    URINE MICROSCOPIC ONLY    Collection Time: 05/08/18  3:00 PM   Result Value Ref Range    WBC 0 to 2 0 - 4 /hpf    RBC 0 0 - 5 /hpf    Epithelial cells FEW 0 - 5 /lpf    Bacteria NEGATIVE  NEG /hpf   POC G3    Collection Time: 05/08/18  3:02 PM   Result Value Ref Range    Device: VENT      FIO2 (POC) 0.50 %    pH (POC) 7.405 7.35 - 7.45      pCO2 (POC) 35.2 35.0 - 45.0 MMHG    pO2 (POC) 112 (H) 80 - 100 MMHG    HCO3 (POC) 22.5 22 - 26 MMOL/L    sO2 (POC) 99 (H) 92 - 97 %    Base deficit (POC) 3 mmol/L    Mode ASSIST CONTROL      Tidal volume 450 ml    Set Rate 15 bpm    PEEP/CPAP (POC) 7 cmH2O    PIP (POC) 21      Allens test (POC) N/A      Inspiratory Time 0.90 sec    Total resp. rate 15      Site LEFT RADIAL      Patient temp.  94.7      Specimen type (POC) ARTERIAL      Performed by Jaden Dowling     Volume control plus YES     EKG, 12 LEAD, INITIAL    Collection Time: 05/08/18  3:06 PM   Result Value Ref Range    Ventricular Rate 65 BPM    Atrial Rate 65 BPM    P-R Interval 162 ms    QRS Duration 102 ms    Q-T Interval 462 ms    QTC Calculation (Bezet) 480 ms    Calculated P Axis 76 degrees    Calculated R Axis -4 degrees    Calculated T Axis 55 degrees    Diagnosis       Sinus rhythm with occasional premature ventricular complexes  Possible Left atrial enlargement  Septal infarct , age undetermined  Moderate voltage criteria for LVH, may be normal variant  Abnormal ECG  No previous ECGs available  Confirmed by Sarah Wilkerson ([id number]) on 5/9/2018 11:00:25 AM     EKG, 12 LEAD, INITIAL    Collection Time: 05/08/18  3:10 PM   Result Value Ref Range    Ventricular Rate 64 BPM    Atrial Rate 64 BPM    P-R Interval 164 ms    QRS Duration 96 ms    Q-T Interval 466 ms    QTC Calculation (Bezet) 480 ms    Calculated P Axis 78 degrees    Calculated R Axis -6 degrees    Calculated T Axis 55 degrees    Diagnosis       Normal sinus rhythm  Possible Left atrial enlargement  Septal infarct (cited on or before 08-MAY-2018)  Left ventricular hypertrophy with secondary ST-T wave changes  Abnormal ECG  When compared with ECG of 08-MAY-2018 15:06,  premature ventricular complexes are no longer present  Confirmed by Shirley Gunderson ([id number]) on 5/9/2018 10:58:49 AM     TSH 3RD GENERATION    Collection Time: 05/08/18 11:20 PM   Result Value Ref Range    TSH 1.07 0.36 - 3.74 uIU/mL   T4, FREE    Collection Time: 05/08/18 11:20 PM   Result Value Ref Range    T4, Free 1.7 (H) 0.7 - 1.5 NG/DL   MAGNESIUM    Collection Time: 05/08/18 11:20 PM   Result Value Ref Range    Magnesium 3.0 (H) 1.6 - 2.6 mg/dL   LACTIC ACID    Collection Time: 05/08/18 11:20 PM   Result Value Ref Range    Lactic acid 9.5 (HH) 0.4 - 2.0 MMOL/L   CARDIAC PANEL,(CK, CKMB & TROPONIN)    Collection Time: 05/08/18 11:20 PM   Result Value Ref Range     (H) 39 - 308 U/L    CK - MB 10.9 (H) <3.6 ng/ml    CK-MB Index 1.2 0.0 - 4.0 %    Troponin-I, Qt. 0.08 (H) 0.0 - 0.045 NG/ML   GLUCOSE, POC    Collection Time: 05/09/18 12:40 AM   Result Value Ref Range    Glucose (POC) 133 (H) 70 - 773 mg/dL   METABOLIC PANEL, BASIC    Collection Time: 05/09/18  3:55 AM   Result Value Ref Range    Sodium 155 (H) 136 - 145 mmol/L    Potassium 4.8 3.5 - 5.5 mmol/L    Chloride 126 (H) 100 - 108 mmol/L    CO2 18 (L) 21 - 32 mmol/L    Anion gap 11 3.0 - 18 mmol/L    Glucose 209 (H) 74 - 99 mg/dL    BUN 95 (H) 7.0 - 18 MG/DL    Creatinine 2.79 (H) 0.6 - 1.3 MG/DL    BUN/Creatinine ratio 34 (H) 12 - 20      GFR est AA 28 (L) >60 ml/min/1.73m2    GFR est non-AA 23 (L) >60 ml/min/1.73m2    Calcium 7.1 (L) 8.5 - 10.1 MG/DL   CBC WITH AUTOMATED DIFF    Collection Time: 05/09/18  3:55 AM   Result Value Ref Range    WBC 12.8 4.6 - 13.2 K/uL    RBC 3.90 (L) 4.70 - 5.50 M/uL    HGB 10.9 (L) 13.0 - 16.0 g/dL    HCT 35.5 (L) 36.0 - 48.0 %    MCV 91.0 74.0 - 97.0 FL    MCH 27.9 24.0 - 34.0 PG    MCHC 30.7 (L) 31.0 - 37.0 g/dL    RDW 15.8 (H) 11.6 - 14.5 %    PLATELET 653 (L) 700 - 420 K/uL    MPV 11.4 9.2 - 11.8 FL NEUTROPHILS 89 (H) 40 - 73 %    LYMPHOCYTES 7 (L) 21 - 52 %    MONOCYTES 4 3 - 10 %    EOSINOPHILS 0 0 - 5 %    BASOPHILS 0 0 - 2 %    ABS. NEUTROPHILS 11.5 (H) 1.8 - 8.0 K/UL    ABS. LYMPHOCYTES 0.8 (L) 0.9 - 3.6 K/UL    ABS. MONOCYTES 0.5 0.05 - 1.2 K/UL    ABS. EOSINOPHILS 0.0 0.0 - 0.4 K/UL    ABS. BASOPHILS 0.0 0.0 - 0.06 K/UL    DF AUTOMATED     CARDIAC PANEL,(CK, CKMB & TROPONIN)    Collection Time: 05/09/18  3:55 AM   Result Value Ref Range     (H) 39 - 308 U/L    CK - MB 7.6 (H) <3.6 ng/ml    CK-MB Index 1.2 0.0 - 4.0 %    Troponin-I, Qt. 0.05 (H) 0.0 - 0.045 NG/ML   LACTIC ACID    Collection Time: 05/09/18  3:55 AM   Result Value Ref Range    Lactic acid 2.4 (HH) 0.4 - 2.0 MMOL/L   HEMOGLOBIN A1C WITH EAG    Collection Time: 05/09/18  3:55 AM   Result Value Ref Range    Hemoglobin A1c 6.5 (H) 4.2 - 5.6 %    Est. average glucose 140 mg/dL   POC G3    Collection Time: 05/09/18  7:12 AM   Result Value Ref Range    Device: VENT      FIO2 (POC) 0.35 %    pH (POC) 7.361 7.35 - 7.45      pCO2 (POC) 31.6 (L) 35.0 - 45.0 MMHG    pO2 (POC) 168 (H) 80 - 100 MMHG    HCO3 (POC) 17.8 (L) 22 - 26 MMOL/L    sO2 (POC) 99 (H) 92 - 97 %    Base deficit (POC) 7 mmol/L    Mode ASSIST CONTROL      Tidal volume 450 ml    Set Rate 15 bpm    PEEP/CPAP (POC) 7 cmH2O    PIP (POC) 18      Allens test (POC) N/A      Inspiratory Time 0.90 sec    Total resp. rate 25      Site LEFT RADIAL      Patient temp.  37.5      Specimen type (POC) ARTERIAL      Performed by Lexx Thompson     Volume control plus YES     GLUCOSE, POC    Collection Time: 05/09/18  7:31 AM   Result Value Ref Range    Glucose (POC) 234 (H) 70 - 110 mg/dL   LACTIC ACID    Collection Time: 05/09/18  8:10 AM   Result Value Ref Range    Lactic acid 2.2 (HH) 0.4 - 2.0 MMOL/L   CARDIAC PANEL,(CK, CKMB & TROPONIN)    Collection Time: 05/09/18  9:45 AM   Result Value Ref Range     (H) 39 - 308 U/L    CK - MB 5.4 (H) <3.6 ng/ml    CK-MB Index 1.1 0.0 - 4.0 % Troponin-I, Qt. 0.05 (H) 0.0 - 0.045 NG/ML   AMMONIA    Collection Time: 05/09/18  9:45 AM   Result Value Ref Range    Ammonia 21 11 - 32 UMOL/L   GLUCOSE, POC    Collection Time: 05/09/18 11:05 AM   Result Value Ref Range    Glucose (POC) 262 (H) 70 - 110 mg/dL   LACTIC ACID    Collection Time: 05/09/18 12:30 PM   Result Value Ref Range    Lactic acid 2.2 (HH) 0.4 - 2.0 MMOL/L   VANCOMYCIN, RANDOM    Collection Time: 05/09/18  2:40 PM   Result Value Ref Range    Vancomycin, random 14.8 5.0 - 40.0 UG/ML   SODIUM    Collection Time: 05/09/18  2:40 PM   Result Value Ref Range    Sodium 156 (H) 136 - 145 mmol/L   GLUCOSE, POC    Collection Time: 05/09/18  5:51 PM   Result Value Ref Range    Glucose (POC) 155 (H) 70 - 110 mg/dL   SODIUM    Collection Time: 05/09/18  6:16 PM   Result Value Ref Range    Sodium 154 (H) 136 - 145 mmol/L   CARDIAC PANEL,(CK, CKMB & TROPONIN)    Collection Time: 05/09/18  6:16 PM   Result Value Ref Range     (H) 39 - 308 U/L    CK - MB 3.8 (H) <3.6 ng/ml    CK-MB Index 0.9 0.0 - 4.0 %    Troponin-I, Qt. 0.06 (H) 0.0 - 0.045 NG/ML   LACTIC ACID    Collection Time: 05/09/18  6:16 PM   Result Value Ref Range    Lactic acid 2.4 (HH) 0.4 - 2.0 MMOL/L   LACTIC ACID    Collection Time: 05/09/18 10:24 PM   Result Value Ref Range    Lactic acid 2.1 (HH) 0.4 - 2.0 MMOL/L   SODIUM, UR, RANDOM    Collection Time: 05/09/18 10:26 PM   Result Value Ref Range    Sodium urine, random 14 (L) 20 - 110 MMOL/L   CREATININE, UR, RANDOM    Collection Time: 05/09/18 10:26 PM   Result Value Ref Range    Creatinine, urine 123.00 30 - 125 mg/dL   SODIUM    Collection Time: 05/10/18 12:06 AM   Result Value Ref Range    Sodium 157 (H) 136 - 145 mmol/L   CARDIAC PANEL,(CK, CKMB & TROPONIN)    Collection Time: 05/10/18 12:06 AM   Result Value Ref Range     (H) 39 - 308 U/L    CK - MB 3.4 <3.6 ng/ml    CK-MB Index 1.0 0.0 - 4.0 %    Troponin-I, Qt. 0.05 (H) 0.0 - 0.045 NG/ML   GLUCOSE, POC    Collection Time: 05/10/18 12:21 AM   Result Value Ref Range    Glucose (POC) 151 (H) 70 - 110 mg/dL   CBC WITH AUTOMATED DIFF    Collection Time: 05/10/18  2:25 AM   Result Value Ref Range    WBC 10.3 4.6 - 13.2 K/uL    RBC 3.24 (L) 4.70 - 5.50 M/uL    HGB 9.1 (L) 13.0 - 16.0 g/dL    HCT 29.1 (L) 36.0 - 48.0 %    MCV 89.8 74.0 - 97.0 FL    MCH 28.1 24.0 - 34.0 PG    MCHC 31.3 31.0 - 37.0 g/dL    RDW 15.7 (H) 11.6 - 14.5 %    PLATELET 088 (L) 381 - 420 K/uL    MPV 11.4 9.2 - 11.8 FL    NEUTROPHILS 85 (H) 40 - 73 %    LYMPHOCYTES 12 (L) 21 - 52 %    MONOCYTES 3 3 - 10 %    EOSINOPHILS 0 0 - 5 %    BASOPHILS 0 0 - 2 %    ABS. NEUTROPHILS 8.8 (H) 1.8 - 8.0 K/UL    ABS. LYMPHOCYTES 1.2 0.9 - 3.6 K/UL    ABS. MONOCYTES 0.3 0.05 - 1.2 K/UL    ABS. EOSINOPHILS 0.0 0.0 - 0.4 K/UL    ABS. BASOPHILS 0.0 0.0 - 0.06 K/UL    DF AUTOMATED     METABOLIC PANEL, COMPREHENSIVE    Collection Time: 05/10/18  2:25 AM   Result Value Ref Range    Sodium 156 (H) 136 - 145 mmol/L    Potassium 4.1 3.5 - 5.5 mmol/L    Chloride 126 (H) 100 - 108 mmol/L    CO2 20 (L) 21 - 32 mmol/L    Anion gap 10 3.0 - 18 mmol/L    Glucose 181 (H) 74 - 99 mg/dL    BUN 97 (H) 7.0 - 18 MG/DL    Creatinine 2.86 (H) 0.6 - 1.3 MG/DL    BUN/Creatinine ratio 34 (H) 12 - 20      GFR est AA 27 (L) >60 ml/min/1.73m2    GFR est non-AA 22 (L) >60 ml/min/1.73m2    Calcium 7.6 (L) 8.5 - 10.1 MG/DL    Bilirubin, total 0.4 0.2 - 1.0 MG/DL    ALT (SGPT) 29 16 - 61 U/L    AST (SGOT) 38 (H) 15 - 37 U/L    Alk.  phosphatase 76 45 - 117 U/L    Protein, total 5.2 (L) 6.4 - 8.2 g/dL    Albumin 1.3 (L) 3.4 - 5.0 g/dL    Globulin 3.9 2.0 - 4.0 g/dL    A-G Ratio 0.3 (L) 0.8 - 1.7     MAGNESIUM    Collection Time: 05/10/18  2:25 AM   Result Value Ref Range    Magnesium 2.6 1.6 - 2.6 mg/dL   PHOSPHORUS    Collection Time: 05/10/18  2:25 AM   Result Value Ref Range    Phosphorus 4.4 2.5 - 4.9 MG/DL   VANCOMYCIN, RANDOM    Collection Time: 05/10/18  2:25 AM   Result Value Ref Range    Vancomycin, random 28.0 5.0 - 40.0 UG/ML   LACTIC ACID    Collection Time: 05/10/18  2:25 AM   Result Value Ref Range    Lactic acid 2.0 0.4 - 2.0 MMOL/L   CARDIAC PANEL,(CK, CKMB & TROPONIN)    Collection Time: 05/10/18  2:25 AM   Result Value Ref Range     39 - 308 U/L    CK - MB 2.9 <3.6 ng/ml    CK-MB Index 0.9 0.0 - 4.0 %    Troponin-I, Qt. 0.05 (H) 0.0 - 0.045 NG/ML   GLUCOSE, POC    Collection Time: 05/10/18  6:09 AM   Result Value Ref Range    Glucose (POC) 172 (H) 70 - 110 mg/dL   LACTIC ACID    Collection Time: 05/10/18  6:10 AM   Result Value Ref Range    Lactic acid 2.2 (HH) 0.4 - 2.0 MMOL/L   POC G3    Collection Time: 05/10/18  6:16 AM   Result Value Ref Range    Device: VENT      FIO2 (POC) 30 %    pH (POC) 7.364 7.35 - 7.45      pCO2 (POC) 32.0 (L) 35.0 - 45.0 MMHG    pO2 (POC) 89 80 - 100 MMHG    HCO3 (POC) 18.5 (L) 22 - 26 MMOL/L    sO2 (POC) 97 92 - 97 %    Base deficit (POC) 7 mmol/L    Mode ASSIST CONTROL      Tidal volume 450 ml    Set Rate 15 bpm    PEEP/CPAP (POC) 5.0 cmH2O    PIP (POC) 12      Allens test (POC) N/A      Inspiratory Time 0.90 sec    Total resp. rate 20      Site RIGHT RADIAL      Patient temp. 35.8      Specimen type (POC) ARTERIAL      Performed by Janet Jewell     Volume control plus YES             XRays were reviewed in past 24 hours    Medications Reviewed      Continued hospitalization is indicated due to Encephalopathy.       Assessment/Plan     Principal Problem:    Encephalopathy (5/8/2018)    Active Problems:    Respiratory failure ([facility] [address] (5/8/2018)      Dehydration (5/8/2018)      Lactic acidosis (5/8/2018)      Hypernatremia (5/8/2018)      SIMIN (acute kidney injury) ([facility] [address] (5/8/2018)      CVA (cerebral vascular accident) ([facility] [address] (5/8/2018)      Sepsis ([facility] [address] (5/9/2018)      Elevated troponin (5/9/2018)      Postprocedural pneumothorax (5/9/2018)      Hyperglycemia (5/9/2018)      Elevated CK (5/9/2018)      Bacteremia (5/10/2018)      Encephalopathy   - possibly metabolic   - CT head shows acute / subacute stroke   - MRI brain negative for acute infarct  - ECHO ordered  - Neurology consult in AM            Respiratory Failure   - intubated PTA   - Intensivist consulted       Dehydration   - Continue  IVF          Hypernatremia   - ->155  - 2/2 above   - Dextrose in water   - Monitor Na level q6       Lactic acidosis   - trend   - 2/2 hypovolemia , sepsis not likely       SIMIN  - Prerenal   - IVF  - Recheck BMP in AM       Elevated troponin  - demand       Hx stroke      Pneumothorax  - chest tube  - surgery following      DVT prophylaxis       Full code   Manus MD Alice  May 10, 2018

## 2018-05-10 NOTE — CONSULTS
[address]    Name:Larisa JOHNS  MR#: [id number]  : 1950  ACCOUNT #: [de-identified]   DATE OF SERVICE: 05/10/2018    CONSULTING PHYSICIAN:  Chad Bran MD    REASON FOR CONSULTATION:  Acute renal failure. HISTORY OF PRESENT ILLNESS:  This is a 28-year-old gentleman who has been admitted through the ER since yesterday for worsening of altered mental status and past history of stroke. Per report, patient is a resident at an outside nursing home with history of small stroke with left-sided hemiparesis and G-tube. He was satting in the 40s in the field, was intubated by EMS en route to the ER. Patient had a CT head which was suspected for acute/subacute infarct. The patient had an MRI done, which showed a decreased caliber of the right MCA with diminished distal branch flow correlating with the prior large right-sided infarct. No significant aneurysm was seen. The patient had progressively worsening renal failure and hypernatremia. The patient has a history of G-tube with decreased p.o. intake. Creatinine was up to 2.8, BUN was high at 97 and sodium was high at 156. I have been asked to manage this patient's electrolytes in this hospitalization. PAST MEDICAL HISTORY:  History of hypertension, history of stroke as described above. No other past medical history is available on the file. SOCIAL HISTORY:  The patient is . No history of tobacco use or alcohol use. FAMILY HISTORY:  No family history of chronic kidney disease. REVIEW OF SYSTEMS:  Unable to obtain as the patient is currently intubated and sedated. PHYSICAL EXAMINATION:  GENERAL:  The patient is intubated on the vent currently with no response to his voice. VITAL SIGNS:  Blood pressure is 138/61, pulse is 76, temperature is 36.4. HEENT:  Pupils are reactive to light. NECK:  Trachea midline. No lymphadenopathy. CARDIOVASCULAR:  S1, S2 heard.   CHEST:  Clear breath sounds. ABDOMEN:  Soft, nontender. GENITOURINARY:  Bladder not palpable. SKIN:  No new rashes. EXTREMITIES:  No significant lower extremity edema. LABORATORY DATA:  Today show sodium 156, potassium 4.1, chloride of 126, bicarb of 20, BUN 97, creatinine of 2.8. ASSESSMENT:  1. Acute renal failure. Differential diagnosis for this patient's progressive worsening renal function could be acute tubular necrosis versus a prerenal etiology in the setting of decreased renal function. From my standpoint, for further evaluation of his acute kidney injury, will check urine electrolytes including a UA, check urine sodium and urine creatinine to calculate the FENa with a quantified proteinuria if he has any. We are not doing a urine protein to creatinine ratio. 2.  Renal ultrasound to screen for hydronephrosis. As far as management is concerned, I would continue with IV fluids and hydration. Labs have been reviewed. No acute need to start this patient on dialysis. As patient has good neurological outcome, he may benefit from doing dialysis if his creatinine continues to get worse; however, at this time, we will continue with D5W at 75 mL an hour. Follow serum creatinine and serum sodium closely during this hospitalization. One reason for him to have azotemia would also be high-dose steroids. Continue to follow closely. Hold off on current antihypertensives. Thank you for the consult. MD Rosa York / Quique Fall  D: 05/10/2018 12:02     T: 05/10/2018 12:38  JOB #: [id number]

## 2018-05-10 NOTE — PROGRESS NOTES
Pharmacy Dosing Services: Vancomycin    Indication: BSI    Day of therapy: 2    Other Antimicrobials (Include dose, start day & day of therapy):  Piperacillin-Tazobactam 3.375 grams every 6 hours, 2018     Loading dose (date given): 1,750 mg  Current Maintenance dose: Dosing per level    Goal Vancomycin Level: 15-20 mcg/mL  (Trough 15-20 for most infections, 20 for meningitis/osteomyelitis, pre-HD level ~25)    Vancomycin Level (if drawn):   - 14.8 (~24 hours post-dose) - ACTION:  given 1250 mg  5/10 - 21.8 (~14 hours post dose)    Significant Cultures:    Blood - GPC - pending    Renal function stable? (unstable defined as SCr increase of 0.5 mg/dL or > 50% increase from baseline, whichever is greater) (Y/N): N     CAPD, Hemodialysis or Renal Replacement Therapy (Y/N): N     Recent Labs      05/10/18   0225  18   0355  18   1444   CREA  2.86*  2.79*  3.56*   BUN  97*  95*  105*   WBC  10.3  12.8  12.7     Temp (24hrs), Av.6 °F (17 °C), Min:35.8 °F (2.1 °C), Max:98.7 °F (37.1 °C)    Creatinine Clearance (Creatinine Clearance (ml/min)): 26 ml/min    Regimen assessment: Renal function slowly improving - continue current regimen  Maintenance dose: Will give 1000 mg once today  Next scheduled level:  at 0400       Pharmacy will follow daily and adjust medications as appropriate for renal function and/or serum levels.     Thank you,  Hellen Albert, PHARMD

## 2018-05-10 NOTE — PROGRESS NOTES
Problem: Falls - Risk of  Goal: *Absence of Falls  Document Blanca Fall Risk and appropriate interventions in the flowsheet. Outcome: Progressing Towards Goal  Fall Risk Interventions:       Mentation Interventions: Door open when patient unattended, Adequate sleep, hydration, pain control, Reorient patient    Medication Interventions: Assess postural VS orthostatic hypotension, Evaluate medications/consider consulting pharmacy    Elimination Interventions: Call light in reach, Toileting schedule/hourly rounds             Problem: Pressure Injury - Risk of  Goal: *Prevention of pressure injury  Document Rene Scale and appropriate interventions in the flowsheet. Outcome: Progressing Towards Goal  Pressure Injury Interventions:  Sensory Interventions: Float heels, Keep linens dry and wrinkle-free, Minimize linen layers, Monitor skin under medical devices, Pad between skin to skin, Pressure redistribution bed/mattress (bed type), Turn and reposition approx. every two hours (pillows and wedges if needed), Use 30-degree side-lying position    Moisture Interventions: Absorbent underpads, Apply protective barrier, creams and emollients, Check for incontinence Q2 hours and as needed, Minimize layers    Activity Interventions: Pressure redistribution bed/mattress(bed type)    Mobility Interventions: HOB 30 degrees or less, Float heels, Suspension boots, Pressure redistribution bed/mattress (bed type), Turn and reposition approx.  every two hours(pillow and wedges)    Nutrition Interventions: Document food/fluid/supplement intake (currently on tube feed and npo)    Friction and Shear Interventions: Apply protective barrier, creams and emollients, HOB 30 degrees or less, Foam dressings/transparent film/skin sealants, Minimize layers, Transferring/repositioning devices (taps turning system)

## 2018-05-10 NOTE — PROGRESS NOTES
Patient seen and examined. CXR on water seal showed left tiny apical pneumothorax. Since there is no air leak, the pneumothorax is very small, and the chest tube is very small (It will not completely resolve this tiny pneumothorax), it is safe to remove the chest tube. I removed the chest tube.  Will get a CXR post removal.

## 2018-05-10 NOTE — OP NOTES
[address] REPORT    Name:Paresh JOHNS  MR#: [id number]  : 1950  ACCOUNT #: [de-identified]   DATE OF SERVICE: 2018    PREOPERATIVE DIAGNOSIS:  Left pneumothorax. POSTOPERATIVE DIAGNOSIS:  Left pneumothorax. SURGEON:  Aidan Romero MD    PROCEDURE PERFORMED:  Left chest tube insertion. ANESTHESIA:  Local plus general.    BLOOD LOSS:  Minimal.    SPECIMENS:  None. COMPLICATIONS: None. INDICATION FOR THE SURGERY:  The patient is a 80-year-old gentleman who is in the intensive care unit, intubated. I got a call that he has a pneumothorax after placement of a central line, the pneumothorax was small. The patient was hemodynamically stable. So at this point, it was observed. A repeat chest x-ray about 5 hours later showed an increase in the pneumothorax, so the decision was made to place a chest tube because he was on the ventilator. I tried to get consent from the daughter, however, she did not answer, so I left her a message and I decided to proceed with the procedure urgently because of the pneumothorax. DETAILS OF PROCEDURE:  A timeout was performed, scrubbing and draping of the left chest was in the usual gentle manner. The patient was already intubated in the intensive care unit so a bedside procedure was performed. The 5th intercostal space was identified and I injected the area with 1% lidocaine in the mid axillary to the anterior axillary line, and then a percutaneous access to the pleura was done with the needle just going above the lower rib between the intercostal space. I was able to aspirate some air and then a wire was inserted and then multiple dilators were threaded through the guidewire to create a tract and then a percutaneous chest tube was inserted. It was connected to the pleurovac. Hemostasis was secure. The chest tube was affixed with 2-0 silk sutures. A chest x-ray was ordered.       MD Devante Johnson / Marjan Preston  D: 05/10/2018 10:01     T: 05/10/2018 12:42  JOB #: [id number]

## 2018-05-10 NOTE — PROGRESS NOTES
1930 Bedside shift change report given to kaden elena (oncoming nurse) by Mc Dowling rn (offgoing nurse). Report included the following information SBAR, Kardex and Recent Results . Side rails up x 3, call light within reach. Hob elevated 30 degrees. Soft wrist restrain on left wrist. Skin intact under restraint. 2000 assessment completed. Oral and sawant care completed. granddaughter at bedside. 2027 DR Eyal Chow notified trop 0.06, sodium level 154. No new orders received. 2200 pt resting in bed. Will make eye ontact closed wyw and wiggled right leg and moved right arm when asked. 0000 reassessment completed. Oral and sawant care completed. 0400 reassessment completed. Oral and sawant care completed. 0600 daughter given brief update. 0730 Bedside shift change report given to wiley mckeon rn (oncoming nurse) by kaden cole rn (offgoing nurse). Report included the following information SBAR, Kardex and Recent Results. Soft wrist intact to right wrist area under intact. Breathing easily with vent. Hob elvated 30 degrees. Side rails up x 3, call light within reach.

## 2018-05-10 NOTE — PROGRESS NOTES
Patient seen and examined. No issues. Was placed back to suction last night because of slight worsening of his pneumothorax. Today there is no air leak. Good oscillation. Plan:  Place back to water seal  Repeat CXR.  If no pneumothorax/stable than it is safe to remove the chest tube

## 2018-05-10 NOTE — PROGRESS NOTES
0700 Bedside and Verbal shift change report received from [facility]. Magdaleno Minor Report included the following information SBAR, Kardex, ED Summary, Intake/Output, MAR, Recent Results and Med Rec Status. 0160 Results for Maryellen Tyler (MRN [id number]) as of 5/9/2018 19:45   Ref. Range 5/9/2018 08:10   Lactic acid Latest Ref Range: 0.4 - 2.0 MMOL/L 2.2 (HH)   Result Called in by [facility] in [facility] Dr Janet Dailey made aware of the lactic acid level. 1100 Dr Kelechi Stratton called and gave a verbal order change chest tube to water seal since chest x ray done in the AM post insertion of the chest tube showed improvement. He also ordered a chest x ray to be done 4 hours after. 1300 Chest tube changed to water seal per MD telephone orders. Pt tolerated well. Chest t x ray ordered for 1730.  1800 Chest x ray delayed due to pt being in the MRI.   Carmen Harrison from radiology called stating that the left Pneumothorax was worse than the pervious X-Ray had indicated. Dr Ari Persaud who was on call for Dr. Kelechi Stratton was informed. Telephone orders were given to return chest tune to suction, monitor the pt breathing and SPO2 and CXR in the AM.  0722 Chest tube back on continuous suction. Pt stable and tolerated the procedure well.  0725 Bedside and Verbal shift change report given to [facility]  (oncoming nurse) by Scout Keen   (offgoing nurse). Report included the following information SBAR, Kardex, ED Summary, Intake/Output, MAR, Recent Results and Med Rec Status.

## 2018-05-11 ENCOUNTER — APPOINTMENT (OUTPATIENT)
Dept: GENERAL RADIOLOGY | Age: 68
DRG: 871 | End: 2018-05-11
Attending: INTERNAL MEDICINE
Payer: MEDICARE

## 2018-05-11 LAB
ALBUMIN SERPL-MCNC: 1.3 G/DL (ref 3.4–5)
ANION GAP SERPL CALC-SCNC: 8 MMOL/L (ref 3–18)
ARTERIAL PATENCY WRIST A: YES
BACTERIA SPEC CULT: ABNORMAL
BASE DEFICIT BLD-SCNC: 6 MMOL/L
BASOPHILS # BLD: 0 K/UL (ref 0–0.06)
BASOPHILS NFR BLD: 0 % (ref 0–2)
BDY SITE: ABNORMAL
BUN SERPL-MCNC: 79 MG/DL (ref 7–18)
BUN/CREAT SERPL: 37 (ref 12–20)
CALCIUM SERPL-MCNC: 7.3 MG/DL (ref 8.5–10.1)
CHLORIDE SERPL-SCNC: 126 MMOL/L (ref 100–108)
CO2 SERPL-SCNC: 20 MMOL/L (ref 21–32)
CREAT SERPL-MCNC: 2.14 MG/DL (ref 0.6–1.3)
DIFFERENTIAL METHOD BLD: ABNORMAL
EOSINOPHIL # BLD: 0 K/UL (ref 0–0.4)
EOSINOPHIL NFR BLD: 0 % (ref 0–5)
ERYTHROCYTE [DISTWIDTH] IN BLOOD BY AUTOMATED COUNT: 15.4 % (ref 11.6–14.5)
GAS FLOW.O2 O2 DELIVERY SYS: ABNORMAL L/MIN
GLUCOSE BLD STRIP.AUTO-MCNC: 151 MG/DL (ref 70–110)
GLUCOSE BLD STRIP.AUTO-MCNC: 187 MG/DL (ref 70–110)
GLUCOSE BLD STRIP.AUTO-MCNC: 226 MG/DL (ref 70–110)
GLUCOSE BLD STRIP.AUTO-MCNC: 265 MG/DL (ref 70–110)
GLUCOSE BLD STRIP.AUTO-MCNC: 67 MG/DL (ref 70–110)
GLUCOSE SERPL-MCNC: 204 MG/DL (ref 74–99)
GRAM STN SPEC: ABNORMAL
HCO3 BLD-SCNC: 19.7 MMOL/L (ref 22–26)
HCT VFR BLD AUTO: 26.3 % (ref 36–48)
HGB BLD-MCNC: 8.4 G/DL (ref 13–16)
LYMPHOCYTES # BLD: 0.8 K/UL (ref 0.9–3.6)
LYMPHOCYTES NFR BLD: 7 % (ref 21–52)
MCH RBC QN AUTO: 28.2 PG (ref 24–34)
MCHC RBC AUTO-ENTMCNC: 31.9 G/DL (ref 31–37)
MCV RBC AUTO: 88.3 FL (ref 74–97)
MONOCYTES # BLD: 0.4 K/UL (ref 0.05–1.2)
MONOCYTES NFR BLD: 4 % (ref 3–10)
NEUTS SEG # BLD: 10.1 K/UL (ref 1.8–8)
NEUTS SEG NFR BLD: 89 % (ref 40–73)
O2/TOTAL GAS SETTING VFR VENT: 25 %
PCO2 BLD: 34.1 MMHG (ref 35–45)
PEEP RESPIRATORY: 5 CMH2O
PH BLD: 7.37 [PH] (ref 7.35–7.45)
PHOSPHATE SERPL-MCNC: 2.8 MG/DL (ref 2.5–4.9)
PLATELET # BLD AUTO: 102 K/UL (ref 135–420)
PMV BLD AUTO: 10.4 FL (ref 9.2–11.8)
PO2 BLD: 97 MMHG (ref 80–100)
POTASSIUM SERPL-SCNC: 3.5 MMOL/L (ref 3.5–5.5)
PRESSURE SUPPORT SETTING VENT: 7 CMH2O
RBC # BLD AUTO: 2.98 M/UL (ref 4.7–5.5)
SAO2 % BLD: 97 % (ref 92–97)
SERVICE CMNT-IMP: ABNORMAL
SERVICE CMNT-IMP: ABNORMAL
SODIUM SERPL-SCNC: 154 MMOL/L (ref 136–145)
SODIUM SERPL-SCNC: 154 MMOL/L (ref 136–145)
SPECIMEN TYPE: ABNORMAL
TOTAL RESP. RATE, ITRR: 32
VANCOMYCIN SERPL-MCNC: 24 UG/ML (ref 5–40)
VENTILATION MODE VENT: ABNORMAL
WBC # BLD AUTO: 11.3 K/UL (ref 4.6–13.2)

## 2018-05-11 PROCEDURE — 80202 ASSAY OF VANCOMYCIN: CPT | Performed by: FAMILY MEDICINE

## 2018-05-11 PROCEDURE — 74011636637 HC RX REV CODE- 636/637: Performed by: FAMILY MEDICINE

## 2018-05-11 PROCEDURE — 74011000258 HC RX REV CODE- 258: Performed by: FAMILY MEDICINE

## 2018-05-11 PROCEDURE — 82962 GLUCOSE BLOOD TEST: CPT

## 2018-05-11 PROCEDURE — 36415 COLL VENOUS BLD VENIPUNCTURE: CPT | Performed by: FAMILY MEDICINE

## 2018-05-11 PROCEDURE — 87040 BLOOD CULTURE FOR BACTERIA: CPT | Performed by: INTERNAL MEDICINE

## 2018-05-11 PROCEDURE — 77010033678 HC OXYGEN DAILY

## 2018-05-11 PROCEDURE — 74011250637 HC RX REV CODE- 250/637: Performed by: FAMILY MEDICINE

## 2018-05-11 PROCEDURE — 84295 ASSAY OF SERUM SODIUM: CPT | Performed by: FAMILY MEDICINE

## 2018-05-11 PROCEDURE — 82803 BLOOD GASES ANY COMBINATION: CPT

## 2018-05-11 PROCEDURE — 74011636637 HC RX REV CODE- 636/637: Performed by: INTERNAL MEDICINE

## 2018-05-11 PROCEDURE — 85025 COMPLETE CBC W/AUTO DIFF WBC: CPT | Performed by: FAMILY MEDICINE

## 2018-05-11 PROCEDURE — 80069 RENAL FUNCTION PANEL: CPT | Performed by: FAMILY MEDICINE

## 2018-05-11 PROCEDURE — 74011250637 HC RX REV CODE- 250/637: Performed by: INTERNAL MEDICINE

## 2018-05-11 PROCEDURE — 74011250636 HC RX REV CODE- 250/636: Performed by: FAMILY MEDICINE

## 2018-05-11 PROCEDURE — 65660000001 HC RM ICU INTERMED STEPDOWN

## 2018-05-11 PROCEDURE — 36600 WITHDRAWAL OF ARTERIAL BLOOD: CPT

## 2018-05-11 PROCEDURE — 71045 X-RAY EXAM CHEST 1 VIEW: CPT

## 2018-05-11 PROCEDURE — 74011250636 HC RX REV CODE- 250/636: Performed by: INTERNAL MEDICINE

## 2018-05-11 PROCEDURE — 94003 VENT MGMT INPAT SUBQ DAY: CPT

## 2018-05-11 RX ORDER — FAMOTIDINE 20 MG/1
20 TABLET, FILM COATED ORAL DAILY
Status: DISCONTINUED | OUTPATIENT
Start: 2018-05-11 | End: 2018-05-13

## 2018-05-11 RX ORDER — HYDRALAZINE HYDROCHLORIDE 20 MG/ML
10 INJECTION INTRAMUSCULAR; INTRAVENOUS
Status: DISCONTINUED | OUTPATIENT
Start: 2018-05-11 | End: 2018-05-13

## 2018-05-11 RX ORDER — INSULIN GLARGINE 100 [IU]/ML
3 INJECTION, SOLUTION SUBCUTANEOUS EVERY 12 HOURS
Status: DISCONTINUED | OUTPATIENT
Start: 2018-05-11 | End: 2018-05-13

## 2018-05-11 RX ORDER — ASPIRIN 325 MG
325 TABLET ORAL DAILY
Status: DISCONTINUED | OUTPATIENT
Start: 2018-05-12 | End: 2018-05-16 | Stop reason: HOSPADM

## 2018-05-11 RX ADMIN — HYDROCORTISONE SODIUM SUCCINATE 50 MG: 100 INJECTION, POWDER, FOR SOLUTION INTRAMUSCULAR; INTRAVENOUS at 08:21

## 2018-05-11 RX ADMIN — HEPARIN SODIUM 5000 UNITS: 5000 INJECTION, SOLUTION INTRAVENOUS; SUBCUTANEOUS at 12:55

## 2018-05-11 RX ADMIN — HEPARIN SODIUM 5000 UNITS: 5000 INJECTION, SOLUTION INTRAVENOUS; SUBCUTANEOUS at 19:39

## 2018-05-11 RX ADMIN — PIPERACILLIN SODIUM,TAZOBACTAM SODIUM 3.38 G: 3; .375 INJECTION, POWDER, FOR SOLUTION INTRAVENOUS at 08:30

## 2018-05-11 RX ADMIN — PIPERACILLIN SODIUM,TAZOBACTAM SODIUM 3.38 G: 3; .375 INJECTION, POWDER, FOR SOLUTION INTRAVENOUS at 01:38

## 2018-05-11 RX ADMIN — HYDRALAZINE HYDROCHLORIDE 10 MG: 20 INJECTION INTRAMUSCULAR; INTRAVENOUS at 20:59

## 2018-05-11 RX ADMIN — HYDROCORTISONE SODIUM SUCCINATE 50 MG: 100 INJECTION, POWDER, FOR SOLUTION INTRAMUSCULAR; INTRAVENOUS at 20:59

## 2018-05-11 RX ADMIN — Medication 10 ML: at 21:50

## 2018-05-11 RX ADMIN — INSULIN GLARGINE 3 UNITS: 100 INJECTION, SOLUTION SUBCUTANEOUS at 20:59

## 2018-05-11 RX ADMIN — DEXTROSE MONOHYDRATE 75 ML/HR: 5 INJECTION, SOLUTION INTRAVENOUS at 08:00

## 2018-05-11 RX ADMIN — INSULIN LISPRO 2 UNITS: 100 INJECTION, SOLUTION INTRAVENOUS; SUBCUTANEOUS at 18:31

## 2018-05-11 RX ADMIN — Medication 10 ML: at 05:21

## 2018-05-11 RX ADMIN — HYDRALAZINE HYDROCHLORIDE 10 MG: 20 INJECTION INTRAMUSCULAR; INTRAVENOUS at 15:21

## 2018-05-11 RX ADMIN — INSULIN LISPRO 6 UNITS: 100 INJECTION, SOLUTION INTRAVENOUS; SUBCUTANEOUS at 05:53

## 2018-05-11 RX ADMIN — HEPARIN SODIUM 5000 UNITS: 5000 INJECTION, SOLUTION INTRAVENOUS; SUBCUTANEOUS at 05:21

## 2018-05-11 RX ADMIN — Medication 10 ML: at 15:21

## 2018-05-11 RX ADMIN — COLLAGENASE SANTYL: 250 OINTMENT TOPICAL at 08:21

## 2018-05-11 RX ADMIN — SODIUM CHLORIDE 1250 MG: 900 INJECTION, SOLUTION INTRAVENOUS at 13:00

## 2018-05-11 RX ADMIN — INSULIN GLARGINE 3 UNITS: 100 INJECTION, SOLUTION SUBCUTANEOUS at 12:54

## 2018-05-11 RX ADMIN — ASPIRIN 300 MG: 300 SUPPOSITORY RECTAL at 08:21

## 2018-05-11 RX ADMIN — INSULIN LISPRO 2 UNITS: 100 INJECTION, SOLUTION INTRAVENOUS; SUBCUTANEOUS at 12:58

## 2018-05-11 RX ADMIN — FAMOTIDINE 20 MG: 20 TABLET ORAL at 12:54

## 2018-05-11 NOTE — PROGRESS NOTES
RENAL PROGRESS NOTE        Kwaku Russo         Assessment/Plan:     · SIMIN (ischemic atn in a setting of volume depletion/sepsis/resp failure). Improving, non oliguric. Unclear if has underlying ckd, no records available. · Hypernatremia. Improving slowly. Increase free water per peg, reduce D5W. Avoid too rapid correction. · Mild met acidosis. Monitor at this time. · HTN. Add amlodipine. · Sepsis. Source? Pulm? On abx per ID. · Lt iatrogenic pneumothorax. Chest tube removed. · Resp failure. Stable after extubation. Subjective:  Patient complaints off: Extubated this am. Alert, ox2. Follows simple commands with rt hand. No cp/sob/n/v. States he is thirsty.          Patient Active Problem List   Diagnosis Code    Respiratory failure ([facility] [address] J96.90    Encephalopathy G93.40    Dehydration E86.0    Lactic acidosis E87.2    Hypernatremia E87.0    SIMIN (acute kidney injury) ([facility] [address] N17.9    CVA (cerebral vascular accident) ([facility] [address] I63.9    Sepsis ([address] A41.9    Elevated troponin R74.8    Postprocedural pneumothorax J95.811    Hyperglycemia R73.9    Elevated CK R74.8    Bacteremia R78.81       Current Facility-Administered Medications   Medication Dose Route Frequency Provider Last Rate Last Dose    vancomycin (VANCOCIN) 1,250 mg in 0.9% sodium chloride 250 mL IVPB  1,250 mg IntraVENous ONCE Laina Fairchild  mL/hr at 05/11/18 1300 1,250 mg at 05/11/18 1300    [START ON 5/12/2018] VANCOMYCIN INFORMATION NOTE   Other ONCE Vicki Hinds MD        famotidine (PEPCID) tablet 20 mg  20 mg Oral DAILY Scooter Decker MD   20 mg at 05/11/18 1254    [START ON 5/12/2018] aspirin (ASPIRIN) tablet 325 mg  325 mg Oral DAILY Scooter Decker MD        insulin glargine (LANTUS) injection 3 Units  3 Units SubCUTAneous Q12H Scooter Decker MD   3 Units at 05/11/18 1254    hydrALAZINE (APRESOLINE) 20 mg/mL injection 10 mg  10 mg IntraVENous Q6H PRN Laina Mandujano MD        hydrocortisone Sod Succ (PF) (SOLU-CORTEF) injection 50 mg  50 mg IntraVENous Q12H Terrance Freitas MD   50 mg at 05/11/18 9915    glucose chewable tablet 16 g  4 Tab Oral PRN Laina Mandujano MD        glucagon (GLUCAGEN) injection 1 mg  1 mg IntraMUSCular PRN Artem Mina MD        dextrose (D50W) injection syrg 12.5-25 g  25-50 mL IntraVENous PRN Laina G MD Devorah   12.5 g at 05/10/18 1316    dextrose 5% infusion  75 mL/hr IntraVENous CONTINUOUS Laina Mandujano MD 75 mL/hr at 05/11/18 0800 75 mL/hr at 05/11/18 0800    collagenase (SANTYL) 250 unit/gram ointment   Topical DAILY Laina Mandujano MD        insulin lispro (HUMALOG) injection   SubCUTAneous Q6H Artem Mina MD   2 Units at 05/11/18 1258    sodium chloride (NS) flush 5-10 mL  5-10 mL IntraVENous PRN Bronson Hurtado MD   10 mL at 05/10/18 0511   [address] dosing per level   Other Rx Dosing/Monitoring Bronson Hurtado MD        sodium chloride (NS) flush 5-10 mL  5-10 mL IntraVENous Q8H Artem Mina MD   10 mL at 05/11/18 0521    sodium chloride (NS) flush 5-10 mL  5-10 mL IntraVENous PRN Artem Mina MD   10 mL at 05/09/18 2225    heparin (porcine) injection 5,000 Units  5,000 Units SubCUTAneous Q8H Laina Mandujano MD   5,000 Units at 05/11/18 1255    midazolam (VERSED) injection 1 mg  1 mg IntraVENous Q3H PRN Terrance Freitas MD   1 mg at 05/09/18 1346    fentaNYL citrate (PF) injection 50 mcg  50 mcg IntraVENous Q4H PRN Terrance Freitas MD   50 mcg at 05/09/18 1545       Objective  Vitals:    05/11/18 1000 05/11/18 1100 05/11/18 1200 05/11/18 1300   BP: 169/68 171/88 158/70 171/76   Pulse: 74 77 75 80   Resp: 18 19 17 21   Temp: (!) 36.8 °F (2.7 °C) (!) 36.8 °F (2.7 °C) (!) 36.8 °F (2.7 °C) (!) 36.7 °F (2.6 °C)   SpO2: 100% 100% 100% 100%   Weight:       Height:             Intake/Output Summary (Last 24 hours) at 05/11/18 1325  Last data filed at 05/11/18 1300   Gross per 24 hour   Intake             3865 ml   Output             1615 ml   Net             2250 ml           Admission weight: Weight: 72.6 kg (160 lb) (05/08/18 1420)  Last Weight Metrics:  Weight Loss Metrics 5/10/2018 5/8/2018   Today's Wt 164 lb 12.8 oz -   BMI - 22.35 kg/m2             Physical Assessment:     General: NAD, alert. Dry oral mucosa. Neck: No jvd. Lt ij central line in place. LUNGS: good air entry, bl exp rhonchi. CVS EXM: S1, S2  RRR. Abdomen: soft, non tender. Peg in place, exit site is clear. Lower Extremities:  trace edema. Lab    CBC w/Diff Recent Labs      05/11/18   0350  05/10/18   0225  05/09/18   0355   WBC  11.3  10.3  12.8   RBC  2.98*  3.24*  3.90*   HGB  8.4*  9.1*  10.9*   HCT  26.3*  29.1*  35.5*   PLT  102*  119*  128*   GRANS  89*  85*  89*   LYMPH  7*  12*  7*   EOS  0  0  0        Chemistry Recent Labs      05/11/18   0350  05/10/18   2047  05/10/18   0810  05/10/18   0225   05/09/18   0355  05/08/18   1444   GLU  204*   --    --   181*   --   209*  177*   NA  154*  152*  157*  156*   < >  155*  158*   K  3.5   --    --   4.1   --   4.8  4.7   CL  126*   --    --   126*   --   126*  125*   CO2  20*   --    --   20*   --   18*  26   BUN  79*   --    --   97*   --   95*  105*   CREA  2.14*   --    --   2.86*   --   2.79*  3.56*   CA  7.3*   --    --   7.6*   --   7.1*  8.5   AGAP  8   --    --   10   --   11  7   BUCR  37*   --    --   34*   --   34*  29*   AP   --    --    --   76   --    --   104   TP   --    --    --   5.2*   --    --   6.8   ALB  1.3*   --    --   1.3*   --    --   1.5*   GLOB   --    --    --   3.9   --    --   5.3*   AGRAT   --    --    --   0.3*   --    --   0.3*   PHOS  2.8   --    --   4.4   < >   --    --     < > = values in this interval not displayed.          No results found for: IRON, FE, TIBC, IBCT, PSAT, FERR Lab Results   Component Value Date/Time    Calcium 7.3 (L) 05/11/2018 03:50 AM    Phosphorus 2.8 05/11/2018 03:50 AM        Alisia Jack M.D.   [facility]  Phone

## 2018-05-11 NOTE — PROGRESS NOTES
Urology    Consulted for chronic sawant that is draining and was changed in ER recently. Renal US with no hydro. Urology consult is not needed at this time. Call back if there is a urologic issue that needs to be addressed.     Hannah Altman MD  , Dept of Urology  [facility]  [facility] [place] [place]  Pager #: [phone]
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
## 2018-05-11 NOTE — PROGRESS NOTES
1910 - Received report from JAEL Adkins. Pt is alert and stable. No complaints at this time. Will continue to monitor.

## 2018-05-11 NOTE — PROGRESS NOTES
Problem: Falls - Risk of  Goal: *Absence of Falls  Document Blanca Fall Risk and appropriate interventions in the flowsheet. Outcome: Progressing Towards Goal  Fall Risk Interventions:       Mentation Interventions: Bed/chair exit alarm, Adequate sleep, hydration, pain control, Door open when patient unattended, Reorient patient, Toileting rounds    Medication Interventions: Evaluate medications/consider consulting pharmacy    Elimination Interventions: Toileting schedule/hourly rounds             Problem: Pressure Injury - Risk of  Goal: *Prevention of pressure injury  Document Rene Scale and appropriate interventions in the flowsheet. Pressure Injury Interventions:  Sensory Interventions: Check visual cues for pain, Float heels, Turn and reposition approx. every two hours (pillows and wedges if needed)    Moisture Interventions: Absorbent underpads, Check for incontinence Q2 hours and as needed, Assess need for specialty bed, Apply protective barrier, creams and emollients    Activity Interventions: Pressure redistribution bed/mattress(bed type)    Mobility Interventions: Turn and reposition approx.  every two hours(pillow and wedges), Suspension boots, Pressure redistribution bed/mattress (bed type), Float heels, Assess need for specialty bed    Nutrition Interventions: Document food/fluid/supplement intake    Friction and Shear Interventions: Apply protective barrier, creams and emollients, Foam dressings/transparent film/skin sealants, Feet elevated on foot rest, Transferring/repositioning devices

## 2018-05-11 NOTE — PROGRESS NOTES
7803  Assumed care of pt from [facility]. Pt eyes closed, resting quietly on ventilator via OETT, right had restrained to prevent self injury. VSS per monitor. For SBT today and probable extubation. 0800  Rectal tube leaking; shun care done, decubitus dressing changed. Pt slow to respond but responsive, giving thumbs up and thumbs down appropriately. 1110  Extubated, placed on n/c 4 lpm  1200  Eupneic, n/c on 2 lpm.  1300  Dr. Chrissy Sandy here, orders noted. TF and water flush rate changed at 1330.

## 2018-05-11 NOTE — PROGRESS NOTES
Problem: Falls - Risk of  Goal: *Absence of Falls  Document Blanca Fall Risk and appropriate interventions in the flowsheet. Outcome: Progressing Towards Goal  Fall Risk Interventions:       Mentation Interventions: Bed/chair exit alarm, Adequate sleep, hydration, pain control, Door open when patient unattended, Reorient patient, Toileting rounds    Medication Interventions: Evaluate medications/consider consulting pharmacy    Elimination Interventions: Toileting schedule/hourly rounds             Problem: Pressure Injury - Risk of  Goal: *Prevention of pressure injury  Document Rene Scale and appropriate interventions in the flowsheet. Outcome: Progressing Towards Goal  Pressure Injury Interventions:  Sensory Interventions: Check visual cues for pain, Float heels, Turn and reposition approx. every two hours (pillows and wedges if needed)    Moisture Interventions: Absorbent underpads, Check for incontinence Q2 hours and as needed, Assess need for specialty bed, Apply protective barrier, creams and emollients    Activity Interventions: Pressure redistribution bed/mattress(bed type)    Mobility Interventions: Turn and reposition approx.  every two hours(pillow and wedges), Suspension boots, Pressure redistribution bed/mattress (bed type), Float heels, Assess need for specialty bed    Nutrition Interventions: Document food/fluid/supplement intake    Friction and Shear Interventions: Apply protective barrier, creams and emollients, Foam dressings/transparent film/skin sealants, Feet elevated on foot rest, Transferring/repositioning devices

## 2018-05-11 NOTE — PROGRESS NOTES
Pharmacy Dosing Services: Vancomycin    Indication: BSI    Day of therapy: 2    Other Antimicrobials (Include dose, start day & day of therapy):  Piperacillin-Tazobactam 3.375 grams every 6 hours, 2018     Loading dose (date given): 1,750 mg  Current Maintenance dose: Dosing per level    Goal Vancomycin Level: 15-20 mcg/mL  (Trough 15-20 for most infections, 20 for meningitis/osteomyelitis, pre-HD level ~25)    Vancomycin Level (if drawn):   - 14.8 (~24 hours post-dose) - ACTION:  given 1250 mg  5/10 - 21.8 (~14 hours post dose) - ACTION: given 1000 mg   - 24 (~13 hours post-dose)    Significant Cultures:    Blood - Staph hominis    Renal function stable? (unstable defined as SCr increase of 0.5 mg/dL or > 50% increase from baseline, whichever is greater) (Y/N): N     CAPD, Hemodialysis or Renal Replacement Therapy (Y/N): N     Recent Labs      18   0350  05/10/18   0225  18   0355   CREA  2.14*  2.86*  2.79*   BUN  79*  97*  95*   WBC  11.3  10.3  12.8     Temp (24hrs), Av.3 °F (18.5 °C), Min:36.1 °F (2.3 °C), Max:97.9 °F (36.6 °C)    Creatinine Clearance (Creatinine Clearance (ml/min)): 35 ml/min    Regimen assessment:   - Renal function slowly improving - no HD for now  - Continue dosing per level for now. Maintenance dose: Will give 1250 mg once today    Next scheduled level:  at 0400       Pharmacy will follow daily and adjust medications as appropriate for renal function and/or serum levels.     Thank you,  Albert Cast, PHARMD

## 2018-05-11 NOTE — PROGRESS NOTES
Progress Note      Patient: Baljeet Leavitt               Sex: male          DOA: 5/8/2018       YOB: 1950      Age:  76 y.o.        LOS:  LOS: 3 days               Subjective:   Delvin York a 76 y. o. male who presents with via EMS unresponsive . Patient  is a resident at [address] [place] [address] with hx stroke with Left hemiparesis and G tube. He was sating 40's in the field and was intubated by EMS PTA. In the ED code S was called. Seen by tele neurology. CT head was suspect for acute/subacute infarct. Patient MRI brain does not show acute infarct . He is improving and able to follow some commands this AM. Still intubated. Objective:      Visit Vitals    /72    Pulse 91    Temp 98.2 °F (36.8 °C)    Resp 24    Ht 6' (1.829 m)    Wt 74.8 kg (164 lb 12.8 oz)    SpO2 99%    BMI 22.35 kg/m2         Physical Exam   Constitutional: He appears well-developed. He appears lethargic. He is intubated. HENT:   Head: Normocephalic. Eyes: No scleral icterus. Cardiovascular: Normal rate, regular rhythm and normal heart sounds. No murmur heard. Pulmonary/Chest: Effort normal and breath sounds normal. He is intubated. He has no wheezes. He has no rales. Abdominal: Soft. Bowel sounds are normal.   Musculoskeletal: He exhibits no edema. Neurological: He appears lethargic. Skin: Skin is warm. No rash noted. No erythema. No pallor.    Psychiatric:   Intubated          Intake and Output:  Current Shift:  05/11 0701 - 05/11 1900  In: 400 [I.V.:250]  Out: 220 [Urine:170; Drains:50]  Last three shifts:  05/09 1901 - 05/11 0700  In: 5759.4 [I.V.:3199.4]  Out: 7693 [Urine:1605; Drains:200]    Recent Results (from the past 48 hour(s))   GLUCOSE, POC    Collection Time: 05/09/18 11:05 AM   Result Value Ref Range    Glucose (POC) 262 (H) 70 - 110 mg/dL   LACTIC ACID    Collection Time: 05/09/18 12:30 PM   Result Value Ref Range    Lactic acid 2.2 (HH) 0.4 - 2.0 MMOL/L   VANCOMYCIN, RANDOM Collection Time: 05/09/18  2:40 PM   Result Value Ref Range    Vancomycin, random 14.8 5.0 - 40.0 UG/ML   SODIUM    Collection Time: 05/09/18  2:40 PM   Result Value Ref Range    Sodium 156 (H) 136 - 145 mmol/L   GLUCOSE, POC    Collection Time: 05/09/18  5:51 PM   Result Value Ref Range    Glucose (POC) 155 (H) 70 - 110 mg/dL   SODIUM    Collection Time: 05/09/18  6:16 PM   Result Value Ref Range    Sodium 154 (H) 136 - 145 mmol/L   CARDIAC PANEL,(CK, CKMB & TROPONIN)    Collection Time: 05/09/18  6:16 PM   Result Value Ref Range     (H) 39 - 308 U/L    CK - MB 3.8 (H) <3.6 ng/ml    CK-MB Index 0.9 0.0 - 4.0 %    Troponin-I, Qt. 0.06 (H) 0.0 - 0.045 NG/ML   LACTIC ACID    Collection Time: 05/09/18  6:16 PM   Result Value Ref Range    Lactic acid 2.4 (HH) 0.4 - 2.0 MMOL/L   LACTIC ACID    Collection Time: 05/09/18 10:24 PM   Result Value Ref Range    Lactic acid 2.1 (HH) 0.4 - 2.0 MMOL/L   SODIUM, UR, RANDOM    Collection Time: 05/09/18 10:26 PM   Result Value Ref Range    Sodium urine, random 14 (L) 20 - 110 MMOL/L   CREATININE, UR, RANDOM    Collection Time: 05/09/18 10:26 PM   Result Value Ref Range    Creatinine, urine 123.00 30 - 125 mg/dL   SODIUM    Collection Time: 05/10/18 12:06 AM   Result Value Ref Range    Sodium 157 (H) 136 - 145 mmol/L   CARDIAC PANEL,(CK, CKMB & TROPONIN)    Collection Time: 05/10/18 12:06 AM   Result Value Ref Range     (H) 39 - 308 U/L    CK - MB 3.4 <3.6 ng/ml    CK-MB Index 1.0 0.0 - 4.0 %    Troponin-I, Qt. 0.05 (H) 0.0 - 0.045 NG/ML   GLUCOSE, POC    Collection Time: 05/10/18 12:21 AM   Result Value Ref Range    Glucose (POC) 151 (H) 70 - 110 mg/dL   CBC WITH AUTOMATED DIFF    Collection Time: 05/10/18  2:25 AM   Result Value Ref Range    WBC 10.3 4.6 - 13.2 K/uL    RBC 3.24 (L) 4.70 - 5.50 M/uL    HGB 9.1 (L) 13.0 - 16.0 g/dL    HCT 29.1 (L) 36.0 - 48.0 %    MCV 89.8 74.0 - 97.0 FL    MCH 28.1 24.0 - 34.0 PG    MCHC 31.3 31.0 - 37.0 g/dL    RDW 15.7 (H) 11.6 - 14.5 %    PLATELET 005 (L) 572 - 420 K/uL    MPV 11.4 9.2 - 11.8 FL    NEUTROPHILS 85 (H) 40 - 73 %    LYMPHOCYTES 12 (L) 21 - 52 %    MONOCYTES 3 3 - 10 %    EOSINOPHILS 0 0 - 5 %    BASOPHILS 0 0 - 2 %    ABS. NEUTROPHILS 8.8 (H) 1.8 - 8.0 K/UL    ABS. LYMPHOCYTES 1.2 0.9 - 3.6 K/UL    ABS. MONOCYTES 0.3 0.05 - 1.2 K/UL    ABS. EOSINOPHILS 0.0 0.0 - 0.4 K/UL    ABS. BASOPHILS 0.0 0.0 - 0.06 K/UL    DF AUTOMATED     METABOLIC PANEL, COMPREHENSIVE    Collection Time: 05/10/18  2:25 AM   Result Value Ref Range    Sodium 156 (H) 136 - 145 mmol/L    Potassium 4.1 3.5 - 5.5 mmol/L    Chloride 126 (H) 100 - 108 mmol/L    CO2 20 (L) 21 - 32 mmol/L    Anion gap 10 3.0 - 18 mmol/L    Glucose 181 (H) 74 - 99 mg/dL    BUN 97 (H) 7.0 - 18 MG/DL    Creatinine 2.86 (H) 0.6 - 1.3 MG/DL    BUN/Creatinine ratio 34 (H) 12 - 20      GFR est AA 27 (L) >60 ml/min/1.73m2    GFR est non-AA 22 (L) >60 ml/min/1.73m2    Calcium 7.6 (L) 8.5 - 10.1 MG/DL    Bilirubin, total 0.4 0.2 - 1.0 MG/DL    ALT (SGPT) 29 16 - 61 U/L    AST (SGOT) 38 (H) 15 - 37 U/L    Alk.  phosphatase 76 45 - 117 U/L    Protein, total 5.2 (L) 6.4 - 8.2 g/dL    Albumin 1.3 (L) 3.4 - 5.0 g/dL    Globulin 3.9 2.0 - 4.0 g/dL    A-G Ratio 0.3 (L) 0.8 - 1.7     MAGNESIUM    Collection Time: 05/10/18  2:25 AM   Result Value Ref Range    Magnesium 2.6 1.6 - 2.6 mg/dL   PHOSPHORUS    Collection Time: 05/10/18  2:25 AM   Result Value Ref Range    Phosphorus 4.4 2.5 - 4.9 MG/DL   VANCOMYCIN, RANDOM    Collection Time: 05/10/18  2:25 AM   Result Value Ref Range    Vancomycin, random 28.0 5.0 - 40.0 UG/ML   LACTIC ACID    Collection Time: 05/10/18  2:25 AM   Result Value Ref Range    Lactic acid 2.0 0.4 - 2.0 MMOL/L   CARDIAC PANEL,(CK, CKMB & TROPONIN)    Collection Time: 05/10/18  2:25 AM   Result Value Ref Range     39 - 308 U/L    CK - MB 2.9 <3.6 ng/ml    CK-MB Index 0.9 0.0 - 4.0 %    Troponin-I, Qt. 0.05 (H) 0.0 - 0.045 NG/ML   GLUCOSE, POC    Collection Time: 05/10/18  6:09 AM   Result Value Ref Range    Glucose (POC) 172 (H) 70 - 110 mg/dL   LACTIC ACID    Collection Time: 05/10/18  6:10 AM   Result Value Ref Range    Lactic acid 2.2 (HH) 0.4 - 2.0 MMOL/L   POC G3    Collection Time: 05/10/18  6:16 AM   Result Value Ref Range    Device: VENT      FIO2 (POC) 30 %    pH (POC) 7.364 7.35 - 7.45      pCO2 (POC) 32.0 (L) 35.0 - 45.0 MMHG    pO2 (POC) 89 80 - 100 MMHG    HCO3 (POC) 18.5 (L) 22 - 26 MMOL/L    sO2 (POC) 97 92 - 97 %    Base deficit (POC) 7 mmol/L    Mode ASSIST CONTROL      Tidal volume 450 ml    Set Rate 15 bpm    PEEP/CPAP (POC) 5.0 cmH2O    PIP (POC) 12      Allens test (POC) N/A      Inspiratory Time 0.90 sec    Total resp. rate 20      Site RIGHT RADIAL      Patient temp. 35.8      Specimen type (POC) ARTERIAL      Performed by Shereen Whittaker     Volume control plus YES     CARDIAC PANEL,(CK, CKMB & TROPONIN)    Collection Time: 05/10/18  8:10 AM   Result Value Ref Range     39 - 308 U/L    CK - MB 2.8 <3.6 ng/ml    CK-MB Index 1.0 0.0 - 4.0 %    Troponin-I, Qt. 0.04 0.0 - 0.045 NG/ML   SODIUM    Collection Time: 05/10/18  8:10 AM   Result Value Ref Range    Sodium 157 (H) 136 - 145 mmol/L   LACTIC ACID    Collection Time: 05/10/18  9:16 AM   Result Value Ref Range    Lactic acid 2.2 (HH) 0.4 - 2.0 MMOL/L   POC G3    Collection Time: 05/10/18 10:32 AM   Result Value Ref Range    Device: VENT      FIO2 (POC) 0.30 %    pH (POC) 7.340 (L) 7.35 - 7.45      pCO2 (POC) 31.9 (L) 35.0 - 45.0 MMHG    pO2 (POC) 80 80 - 100 MMHG    HCO3 (POC) 17.4 (L) 22 - 26 MMOL/L    sO2 (POC) 96 92 - 97 %    Base deficit (POC) 9 mmol/L    Mode CPAP/SPON      PEEP/CPAP (POC) 5 cmH2O    Pressure support 7 cmH2O    Allens test (POC) N/A      Total resp.  rate 14      Site LEFT RADIAL      Patient temp. 36.1      Specimen type (POC) ARTERIAL      Performed by JUNIOR Saenz    Collection Time: 05/10/18 10:45 AM   Result Value Ref Range    Vancomycin, random 21.8 5.0 - 40.0 UG/ML   CK    Collection Time: 05/10/18 10:45 AM   Result Value Ref Range     39 - 308 U/L   GLUCOSE, POC    Collection Time: 05/10/18  1:10 PM   Result Value Ref Range    Glucose (POC) 67 (L) 70 - 110 mg/dL   GLUCOSE, POC    Collection Time: 05/10/18  1:12 PM   Result Value Ref Range    Glucose (POC) 60 (L) 70 - 110 mg/dL   GLUCOSE, POC    Collection Time: 05/10/18  2:34 PM   Result Value Ref Range    Glucose (POC) 209 (H) 70 - 110 mg/dL   LACTIC ACID    Collection Time: 05/10/18  2:37 PM   Result Value Ref Range    Lactic acid 1.9 0.4 - 2.0 MMOL/L   URINALYSIS W/MICROSCOPIC    Collection Time: 05/10/18  4:30 PM   Result Value Ref Range    Color YELLOW      Appearance CLOUDY      Specific gravity 1.027 1.005 - 1.030      pH (UA) 5.0 5.0 - 8.0      Protein 30 (A) NEG mg/dL    Glucose 250 (A) NEG mg/dL    Ketone NEGATIVE  NEG mg/dL    Bilirubin NEGATIVE  NEG      Blood MODERATE (A) NEG      Urobilinogen 1.0 0.2 - 1.0 EU/dL    Nitrites NEGATIVE  NEG      Leukocyte Esterase TRACE (A) NEG      WBC 2 to 4 0 - 4 /hpf    RBC 11 to 20 0 - 5 /hpf    Epithelial cells FEW 0 - 5 /lpf    Bacteria FEW (A) NEG /hpf   GLUCOSE, POC    Collection Time: 05/10/18  5:22 PM   Result Value Ref Range    Glucose (POC) 70 70 - 110 mg/dL   LACTIC ACID    Collection Time: 05/10/18  8:47 PM   Result Value Ref Range    Lactic acid 2.0 0.4 - 2.0 MMOL/L   SODIUM    Collection Time: 05/10/18  8:47 PM   Result Value Ref Range    Sodium 152 (H) 136 - 145 mmol/L   GLUCOSE, POC    Collection Time: 05/10/18 11:46 PM   Result Value Ref Range    Glucose (POC) 217 (H) 70 - 110 mg/dL   CBC WITH AUTOMATED DIFF    Collection Time: 05/11/18  3:50 AM   Result Value Ref Range    WBC 11.3 4.6 - 13.2 K/uL    RBC 2.98 (L) 4.70 - 5.50 M/uL    HGB 8.4 (L) 13.0 - 16.0 g/dL    HCT 26.3 (L) 36.0 - 48.0 %    MCV 88.3 74.0 - 97.0 FL    MCH 28.2 24.0 - 34.0 PG    MCHC 31.9 31.0 - 37.0 g/dL    RDW 15.4 (H) 11.6 - 14.5 %    PLATELET 937 (L) 945 - 420 K/uL    MPV 10.4 9.2 - 11.8 FL    NEUTROPHILS 89 (H) 40 - 73 %    LYMPHOCYTES 7 (L) 21 - 52 %    MONOCYTES 4 3 - 10 %    EOSINOPHILS 0 0 - 5 %    BASOPHILS 0 0 - 2 %    ABS. NEUTROPHILS 10.1 (H) 1.8 - 8.0 K/UL    ABS. LYMPHOCYTES 0.8 (L) 0.9 - 3.6 K/UL    ABS. MONOCYTES 0.4 0.05 - 1.2 K/UL    ABS. EOSINOPHILS 0.0 0.0 - 0.4 K/UL    ABS. BASOPHILS 0.0 0.0 - 0.06 K/UL    DF AUTOMATED     RENAL FUNCTION PANEL    Collection Time: 05/11/18  3:50 AM   Result Value Ref Range    Sodium 154 (H) 136 - 145 mmol/L    Potassium 3.5 3.5 - 5.5 mmol/L    Chloride 126 (H) 100 - 108 mmol/L    CO2 20 (L) 21 - 32 mmol/L    Anion gap 8 3.0 - 18 mmol/L    Glucose 204 (H) 74 - 99 mg/dL    BUN 79 (H) 7.0 - 18 MG/DL    Creatinine 2.14 (H) 0.6 - 1.3 MG/DL    BUN/Creatinine ratio 37 (H) 12 - 20      GFR est AA 37 (L) >60 ml/min/1.73m2    GFR est non-AA 31 (L) >60 ml/min/1.73m2    Calcium 7.3 (L) 8.5 - 10.1 MG/DL    Phosphorus 2.8 2.5 - 4.9 MG/DL    Albumin 1.3 (L) 3.4 - 5.0 g/dL   VANCOMYCIN, RANDOM    Collection Time: 05/11/18  3:50 AM   Result Value Ref Range    Vancomycin, random 24.0 5.0 - 40.0 UG/ML   GLUCOSE, POC    Collection Time: 05/11/18  5:50 AM   Result Value Ref Range    Glucose (POC) 265 (H) 70 - 110 mg/dL   GLUCOSE, POC    Collection Time: 05/11/18  8:16 AM   Result Value Ref Range    Glucose (POC) 226 (H) 70 - 110 mg/dL         XRays were reviewed in past 24 hours    Medications Reviewed      Continued hospitalization is indicated due to Encephalopathy, resp failure , bacteremia .       Assessment/Plan     Principal Problem:    Encephalopathy (5/8/2018)    Active Problems:    Respiratory failure ([address] (5/8/2018)      Dehydration (5/8/2018)      Lactic acidosis (5/8/2018)      Hypernatremia (5/8/2018)      SIMIN (acute kidney injury) ([facility] [address] (5/8/2018)      CVA (cerebral vascular accident) ([facility] [address] (5/8/2018)      Sepsis ([facility] [address] (5/9/2018)      Elevated troponin (5/9/2018)      Postprocedural pneumothorax (5/9/2018)      Hyperglycemia (5/9/2018) Elevated CK (5/9/2018)      Bacteremia (5/10/2018)      Diarrhea      Encephalopathy   -  metabolic    - MRI brain negative for acute infarct  - ECHO ordered  - Neurology consult in AM         Respiratory Failure   - intubated PTA   - Intensivist consulted       Dehydration   - Continue  IVF D5W    Bacteremia   + GPC clusters  - Vanco   - ID following         Hypernatremia   - ->155  - 2/2 above   - Dextrose in water   - Monitor Na level q6       Lactic acidosis   - trend   - 2/2 hypovolemia , sepsis not likely       SIMIN  - Prerenal   - Improving trending down   - IVF  - Recheck BMP in AM       Elevated troponin  - demand       Hx stroke   - MRI brain shows chronic infarction .  No acute infarct      Pneumothorax  - chest tube out  - CXR today pending  - surgery following      Diarrhea   - check for C diff     DVT prophylaxis       Full code     Yarely Aburto MD  May 11, 2018

## 2018-05-11 NOTE — DIABETES MGMT
NUTRITIONAL ASSESSMENT GLYCEMIC CONTROL/ PLAN OF CARE     Kwaku Russo           76 y.o.           5/8/2018                 1. Acute CVA (cerebrovascular accident) ([facility] [address]    2. Hyponatremia    3. Uremia    4. Acute renal failure, unspecified acute renal failure type ([address]    5. Acute respiratory failure with hypoxia (HCC)    6. Hypothermia, initial encounter    DM     INTERVENTIONS/PLAN:   Pt is receiving   Osmolite 1 neno Tubefeeding at 75ml/hr via PEG. No residuals or tolerance problems  TF is providing 1908 calories, 80 g protein and 1.6 liters free water/d from TF. Addtl free water 150 ml q 6 hrs. for hypernatremia. Discussed on IDR to add  Lantus to 3  units q 12 hrs. ASSESSMENT:   Nutritional Status:  Pt with increased calorie and protein requirements related to St 2 and Stage 3 pressure ulcers. Altered nutrition related lab values r/t diabetes AEB HbA1c= 6.5% and 3 BG>200mg/dl. Pt w/hypoalbuminemia as evidenced by albumin=1.5mg/dl. Pt is underweight related to inadequate caloric intake as evidenced by 90% ideal weight and BMI= 21.7 kg/m2 , no record to evaluate for unintentional weight loss. Altered nutrition related lab values r/t SIMIN  AEB BUN=79 and Cr 2.14. Diabetes Management:         Within target range (non-ICU: <140; ICU<180): [] Yes   [x]  No  Current Insulin regimen: corrective lispro  Home medication/insulin regimen: n/a  HbA1c: 6.5% equivalent  to ave Blood Glucose of 135mg/dl for 2-3 months prior to admission  Adequate glycemic control PTA:  [x] Yes  [] No     SUBJECTIVE/OBJECTIVE:   Information obtained from: ICU rounds  Diet: Osmolite 1 neno  TF at 75ml/hr  Pt had a  TF via PEG at [facility]    No data found.   Medications: [x]                Reviewed   Recent Glucose Results:   Lab Results   Component Value Date/Time     (H) 05/11/2018 03:50 AM    GLUCPOC 226 (H) 05/11/2018 08:16 AM    GLUCPOC 265 (H) 05/11/2018 05:50 AM    GLUCPOC 217 (H) 05/10/2018 11:46 PM         Labs:   Lab Results Component Value Date/Time    Hemoglobin A1c 6.5 (H) 05/09/2018 03:55 AM     Lab Results   Component Value Date/Time    Sodium 154 (H) 05/11/2018 03:50 AM    Potassium 3.5 05/11/2018 03:50 AM    Chloride 126 (H) 05/11/2018 03:50 AM    CO2 20 (L) 05/11/2018 03:50 AM    Anion gap 8 05/11/2018 03:50 AM    Glucose 204 (H) 05/11/2018 03:50 AM    BUN 79 (H) 05/11/2018 03:50 AM    Creatinine 2.14 (H) 05/11/2018 03:50 AM    Calcium 7.3 (L) 05/11/2018 03:50 AM    Magnesium 2.6 05/10/2018 02:25 AM    Phosphorus 2.8 05/11/2018 03:50 AM    Albumin 1.3 (L) 05/11/2018 03:50 AM       Anthropometrics: IBW : 80.7 kg (178 lb), % IBW (Calculated): 89.89 %, BMI (calculated): 22.3  Wt Readings from Last 1 Encounters:   05/10/18 74.8 kg (164 lb 12.8 oz)      Ht Readings from Last 1 Encounters:   05/09/18 6' (1.829 m)       Estimated Nutrition Needs:  2016 Kcals/day, Protein (g): 90 g Fluid (ml): 2000 ml  Based on:   [x]          Actual BW    []          ABW   []            Adjusted BW    Nutrition Diagnoses: As stated above. Nutrition Interventions: TF rec and insulin rec  Goal:   Blood glucose will be within target range of  mg/dL by 5/14/18. Intake will meet >75% of energy and protein requirements by 5/18/18- met.     Nutrition Monitoring and Evaluation      []     Monitor po intake on meal rounds  [x]     Continue inpatient monitoring and intervention  []     Other:      Nutrition Risk:  [x]   High     []  Moderate    []  Minimal/Uncompromised    Donis Keenan RD  pgr [phone]

## 2018-05-11 NOTE — PROGRESS NOTES
PCCM Progress Note                                              I have reviewed the flowsheet and previous days notes. Events, vitals, medications and notes from last 24 hours reviewed. Care plan discussed with staff and on multidisciplinary rounds. Subjective:  5/11/2018   Pt is on the vent. He is awake and follows commands. He is on SBT and doing well on it    Impression and Plan  VDRF - Pt started on SBT today with PS trial. Doing well on it. RSBI is around 50. Will get an ABG and if it looks good plan to extubate. Pt was on SBT yesterday as well but ABG was not optimal.   Encephalopathy - Improving. F/u with neurology   Hx of CVA. MRI brain on 5/9/18 shows subacute and chronic infarcts involving the right cerebral hemisphere, Small chronic infarct left frontal lobe. MRA brain shows No gross abnormality. Neurology note says stroke was not acute. Sepsis - Pt is afebrile today. Blood cx shows Staph Hominis. Likely source is skin wounds. Consult ID, D/w . Pt is on zosyn and Vanco.   SIMIN - Slightly better today. F/u with nephrology  Hypernatremia - Pt is on D5W and free water flushes. Increase free water flushes. Sodium is slowly coming down  S/p Left small pneumothorax - CT removed by surgery yesterday. F/u repeat Cxr today. Pt has min Subcut. air around the chest tube site. DVT and GI proph - Cont heparin and pepcid  Nutrition - Cont tube feeds, its at goal today  Hyperglycemia - ISS and lantus. Nutrition consulted  Chronic sawant - Will consult Urology    OTHER:  Glycemic Control. Glucose stabilizer per ICU protocol when on insulin drip. Maintain blood glucose 140-180. Replace electrolytes per ICU electrolyte replacement protocol  Ventilator bundle & Sedation protocol followed. Daily morning sedation holiday, assessment for readiness for SBT & weaning from ventilator; and then re-titrate if required. Aim to keep peak plateau pressure [id number] H2O in [facility] patient.  [place] tube to suction at 20-30 cm H2O, Maintain [place] tube with 5-10ml air every 4 hours. Chlorhexidine mouth washes and routine oral care every 4 hours. Stress ulcer and DVT prophylaxis. HOB >=30 degree elevation all the time. HOB >=30 degree elevation all the time. Aggressive pulmonary toileting. Incentive spirometry when appropriate. Aspiration precautions. Sepsis bundle and protocol followed. Deescalate antibiotic when appropriate. Central Line bundle followed, remove when not needed. Large bore IV line or CVP when appropriate. Sawant bundle followed, remove sawant catheter when not critically ill. PT/OT eval and treat. OOB/IS when appropriate. Quality Care: Stress ulcer prophylaxis, DVT prophylaxis, HOB elevated, Infection control all reviewed and addressed. Events and notes from last 24 hours reviewed. Care plan discussed with nursing. D/w patient's family above medical problems and answered all questions to their satisfaction. CC TIME: >45 min     Medication Reviewed:    No Known Allergies   No past medical history on file. No past surgical history on file. Social History   Substance Use Topics    Smoking status: Not on file    Smokeless tobacco: Not on file    Alcohol use Not on file      No family history on file.    Prior to Admission medications    Not on File     Current Facility-Administered Medications   Medication Dose Route Frequency    vancomycin (VANCOCIN) 1,250 mg in 0.9% sodium chloride 250 mL IVPB  1,250 mg IntraVENous ONCE    [START ON 5/12/2018] VANCOMYCIN INFORMATION NOTE   Other ONCE    famotidine (PEPCID) tablet 20 mg  20 mg Oral DAILY    [START ON 5/12/2018] aspirin (ASPIRIN) tablet 325 mg  325 mg Oral DAILY    insulin glargine (LANTUS) injection 3 Units  3 Units SubCUTAneous Q12H    hydrocortisone Sod Succ (PF) (SOLU-CORTEF) injection 50 mg  50 mg IntraVENous Q12H    piperacillin-tazobactam (ZOSYN) 3.375 g in 0.9% sodium chloride (MBP/ADV) 100 mL MBP  #EXTENDED 4-HOUR INFUSION###  3.375 g IntraVENous Q8H    dextrose 5% infusion  75 mL/hr IntraVENous CONTINUOUS    collagenase (SANTYL) 250 unit/gram ointment   Topical DAILY    insulin lispro (HUMALOG) injection   SubCUTAneous Q6H    [facility] dosing per level   Other Rx Dosing/Monitoring    sodium chloride (NS) flush 5-10 mL  5-10 mL IntraVENous Q8H    heparin (porcine) injection 5,000 Units  5,000 Units SubCUTAneous Q8H       Lines: All central lines examined by me. No signs of erythema, induration, discharge. Central Venous Catheter:  Triple Lumen 05/08/18 Left Internal jugular (Active)   Central Line Being Utilized Yes 5/9/2018  4:00 AM   Criteria for Appropriate Use Hemodynamically unstable, requiring monitoring lines, vasopressors, or volume resuscitation 5/9/2018  4:00 AM   Site Assessment Clean, dry, & intact 5/9/2018  4:00 AM   Infiltration Assessment 0 5/9/2018  4:00 AM   Affected Extremity/Extremities Color distal to insertion site pink (or appropriate for race); Pulses palpable 5/9/2018  4:00 AM   Date of Last Dressing Change 05/09/18 5/9/2018  4:00 AM   Dressing Status Clean, dry, & intact 5/9/2018  4:00 AM   Dressing Type Tape;Disk with Chlorhexadine gluconate (CHG); Transparent 5/9/2018  4:00 AM   Action Taken Open ports on tubing capped 5/9/2018  4:00 AM   Proximal Hub Color/Line Status Brown; Infusing 5/9/2018  4:00 AM   Positive Blood Return (Medial Site) Yes 5/9/2018  4:00 AM   Medial Hub Color/Line Status White; Infusing 5/9/2018  4:00 AM   Positive Blood Return (Lateral Site) Yes 5/9/2018  4:00 AM   Distal Hub Color/Line Status Blue;Capped 5/9/2018  4:00 AM   Positive Blood Return (Site #3) Yes 5/9/2018  4:00 AM   Alcohol Cap Used Yes 5/9/2018  4:00 AM     Peripheral Intravenous Line:  Peripheral IV 05/08/18 Left Antecubital (Active)   Site Assessment Clean, dry, & intact 5/9/2018  7:00 AM   Phlebitis Assessment 0 5/9/2018  7:00 AM   Infiltration Assessment 0 5/9/2018  7:00 AM   Dressing Status Clean, dry, & intact 2018  7:00 AM   Dressing Type Tape;Transparent 2018  4:00 AM   Hub Color/Line Status Green; Infusing 2018  4:00 AM   Action Taken Open ports on tubing capped 2018  4:00 AM   Alcohol Cap Used No 2018  4:00 AM   Drain(s):  PEG/Gastrostomy Tube 18 (Active)   Site Assessment Clean, dry, & intact 2018  4:00 AM   Dressing Status Clean, dry, & intact 2018  4:00 AM   G Port Status Clamped 2018  4:00 AM     Airway:  Airway - Endotracheal Tube 18 Oral (Active)   Insertion Depth (cm) 26 cm 2018  7:14 AM   Line Gee Lips 2018  7:14 AM   Side Secured Left 2018  7:14 AM   Cuff Pressure 28 cmH20 2018  7:14 AM   Site Assessment Clean, dry, & intact 2018  7:14 AM       Objective:  Vital Signs:    Visit Vitals    /72    Pulse 91    Temp 98.2 °F (36.8 °C)    Resp 24    Ht 6' (1.829 m)    Wt 74.8 kg (164 lb 12.8 oz)    SpO2 99%    BMI 22.35 kg/m2      O2 Device: Ventilator, Endotracheal tube     Temp (24hrs), Av.6 °F (29.2 °C), Min:36.6 °F (2.6 °C), Max:98.2 °F (36.8 °C)      Intake/Output:   Last shift:      701 - 1900  In: 400 [I.V.:250]  Out: 220 [Urine:170; Drains:50]    Last 3 shifts: 1901 -  07  In: 5759.4 [I.V.:3199.4]  Out: 6911 [Urine:1605; Drains:200]      Intake/Output Summary (Last 24 hours) at 18 1056  Last data filed at 18 0900   Gross per 24 hour   Intake             4225 ml   Output             1565 ml   Net             2660 ml       Last 3 Recorded Weights in this Encounter    18 1747 184 05/10/18 2029   Weight: 72.6 kg (160 lb) 75.2 kg (165 lb 12.8 oz) 74.8 kg (164 lb 12.8 oz)       Ventilator Settings:  Mode Rate Tidal Volume Pressure FiO2 PEEP  Pressure support   45 ml  7 cm H2O 25 % 5 cm H20    Peak airway pressure: 13 cm H2O   Plateau pressure:    Tidal volume:   Minute ventilation: 9.5 l/min  SPO2     ARDS network Guidelines: Lung protective strategy and Plateau pressure goals less than or equal to 30    Physical Exam:     General/Neurology: On vent, follow voice commands, opens eyes  Head:   Normocephalic, without obvious abnormality  Eye:   PERRL, no scleral icterus, no pallor  Oral:   Mucus membranes moist  Neck:   Supple  Lung:   B/l air entry is decreased, small subcutaneous emphysema present around left chest tube site  Heart:   Regular rate & rhythm. S1 S2 present.    Abdomen/: Soft, non tender, BS +nt  Extremities:  Min pedal edema  Skin:  Ch stases changes present    Data:      Recent Results (from the past 24 hour(s))   GLUCOSE, POC    Collection Time: 05/10/18  1:10 PM   Result Value Ref Range    Glucose (POC) 67 (L) 70 - 110 mg/dL   GLUCOSE, POC    Collection Time: 05/10/18  1:12 PM   Result Value Ref Range    Glucose (POC) 60 (L) 70 - 110 mg/dL   GLUCOSE, POC    Collection Time: 05/10/18  2:34 PM   Result Value Ref Range    Glucose (POC) 209 (H) 70 - 110 mg/dL   LACTIC ACID    Collection Time: 05/10/18  2:37 PM   Result Value Ref Range    Lactic acid 1.9 0.4 - 2.0 MMOL/L   URINALYSIS W/MICROSCOPIC    Collection Time: 05/10/18  4:30 PM   Result Value Ref Range    Color YELLOW      Appearance CLOUDY      Specific gravity 1.027 1.005 - 1.030      pH (UA) 5.0 5.0 - 8.0      Protein 30 (A) NEG mg/dL    Glucose 250 (A) NEG mg/dL    Ketone NEGATIVE  NEG mg/dL    Bilirubin NEGATIVE  NEG      Blood MODERATE (A) NEG      Urobilinogen 1.0 0.2 - 1.0 EU/dL    Nitrites NEGATIVE  NEG      Leukocyte Esterase TRACE (A) NEG      WBC 2 to 4 0 - 4 /hpf    RBC 11 to 20 0 - 5 /hpf    Epithelial cells FEW 0 - 5 /lpf    Bacteria FEW (A) NEG /hpf   GLUCOSE, POC    Collection Time: 05/10/18  5:22 PM   Result Value Ref Range    Glucose (POC) 70 70 - 110 mg/dL   LACTIC ACID    Collection Time: 05/10/18  8:47 PM   Result Value Ref Range    Lactic acid 2.0 0.4 - 2.0 MMOL/L   SODIUM    Collection Time: 05/10/18  8:47 PM   Result Value Ref Range    Sodium 152 (H) 136 - 145 mmol/L   GLUCOSE, POC Collection Time: 05/10/18 11:46 PM   Result Value Ref Range    Glucose (POC) 217 (H) 70 - 110 mg/dL   CBC WITH AUTOMATED DIFF    Collection Time: 05/11/18  3:50 AM   Result Value Ref Range    WBC 11.3 4.6 - 13.2 K/uL    RBC 2.98 (L) 4.70 - 5.50 M/uL    HGB 8.4 (L) 13.0 - 16.0 g/dL    HCT 26.3 (L) 36.0 - 48.0 %    MCV 88.3 74.0 - 97.0 FL    MCH 28.2 24.0 - 34.0 PG    MCHC 31.9 31.0 - 37.0 g/dL    RDW 15.4 (H) 11.6 - 14.5 %    PLATELET 597 (L) 521 - 420 K/uL    MPV 10.4 9.2 - 11.8 FL    NEUTROPHILS 89 (H) 40 - 73 %    LYMPHOCYTES 7 (L) 21 - 52 %    MONOCYTES 4 3 - 10 %    EOSINOPHILS 0 0 - 5 %    BASOPHILS 0 0 - 2 %    ABS. NEUTROPHILS 10.1 (H) 1.8 - 8.0 K/UL    ABS. LYMPHOCYTES 0.8 (L) 0.9 - 3.6 K/UL    ABS. MONOCYTES 0.4 0.05 - 1.2 K/UL    ABS. EOSINOPHILS 0.0 0.0 - 0.4 K/UL    ABS.  BASOPHILS 0.0 0.0 - 0.06 K/UL    DF AUTOMATED     RENAL FUNCTION PANEL    Collection Time: 05/11/18  3:50 AM   Result Value Ref Range    Sodium 154 (H) 136 - 145 mmol/L    Potassium 3.5 3.5 - 5.5 mmol/L    Chloride 126 (H) 100 - 108 mmol/L    CO2 20 (L) 21 - 32 mmol/L    Anion gap 8 3.0 - 18 mmol/L    Glucose 204 (H) 74 - 99 mg/dL    BUN 79 (H) 7.0 - 18 MG/DL    Creatinine 2.14 (H) 0.6 - 1.3 MG/DL    BUN/Creatinine ratio 37 (H) 12 - 20      GFR est AA 37 (L) >60 ml/min/1.73m2    GFR est non-AA 31 (L) >60 ml/min/1.73m2    Calcium 7.3 (L) 8.5 - 10.1 MG/DL    Phosphorus 2.8 2.5 - 4.9 MG/DL    Albumin 1.3 (L) 3.4 - 5.0 g/dL   VANCOMYCIN, RANDOM    Collection Time: 05/11/18  3:50 AM   Result Value Ref Range    Vancomycin, random 24.0 5.0 - 40.0 UG/ML   GLUCOSE, POC    Collection Time: 05/11/18  5:50 AM   Result Value Ref Range    Glucose (POC) 265 (H) 70 - 110 mg/dL   GLUCOSE, POC    Collection Time: 05/11/18  8:16 AM   Result Value Ref Range    Glucose (POC) 226 (H) 70 - 110 mg/dL         Chemistry   Recent Labs      05/11/18   0350  05/10/18   2047  05/10/18   0810  05/10/18   0225   05/09/18   0355  05/08/18   2320  05/08/18   1444   GLU 204*   --    --   181*   --   209*   --   177*   NA  154*  152*  157*  156*   < >  155*   --   158*   K  3.5   --    --   4.1   --   4.8   --   4.7   CL  126*   --    --   126*   --   126*   --   125*   CO2  20*   --    --   20*   --   18*   --   26   BUN  79*   --    --   97*   --   95*   --   105*   CREA  2.14*   --    --   2.86*   --   2.79*   --   3.56*   CA  7.3*   --    --   7.6*   --   7.1*   --   8.5   MG   --    --    --   2.6   --    --   3.0*   --    PHOS  2.8   --    --   4.4   --    --    --    --    AGAP  8   --    --   10   --   11   --   7   BUCR  37*   --    --   34*   --   34*   --   29*   AP   --    --    --   76   --    --    --   104   TP   --    --    --   5.2*   --    --    --   6.8   ALB  1.3*   --    --   1.3*   --    --    --   1.5*   GLOB   --    --    --   3.9   --    --    --   5.3*   AGRAT   --    --    --   0.3*   --    --    --   0.3*    < > = values in this interval not displayed. CBC w/Diff   Recent Labs      05/11/18   0350  05/10/18   0225  05/09/18   0355   WBC  11.3  10.3  12.8   RBC  2.98*  3.24*  3.90*   HGB  8.4*  9.1*  10.9*   HCT  26.3*  29.1*  35.5*   PLT  102*  119*  128*   GRANS  89*  85*  89*   LYMPH  7*  12*  7*   EOS  0  0  0       ABG    Recent Labs      05/10/18   1032  05/10/18   0616  05/09/18   0712   PHI  7.340*  7.364  7.361   PCO2I  31.9*  32.0*  31.6*   PO2I  80  89  168*   HCO3I  17.4*  18.5*  17.8*   FIO2I  0.30  30  0.35       Micro     Recent Labs      05/08/18   1430   CULT  AEROBIC AND ANAEROBIC BOTTLES STAPHYLOCOCCUS HOMINIS*     Recent Labs      05/08/18   1430   CULT  AEROBIC AND ANAEROBIC BOTTLES STAPHYLOCOCCUS HOMINIS*       CT (Most Recent)      Results from Hospital Encounter encounter on 05/08/18   CT HEAD WO CONT   Narrative EXAM: CT head    INDICATION: Unresponsive    COMPARISON: No prior comparison study.     TECHNIQUE: Axial CT imaging of the head was performed without intravenous  contrast.  Additional coronal and sagittal reconstructions were performed. One  or more dose reduction techniques were used on this CT: automated exposure  control, adjustment of the mAs and/or kVp according to patient's size, and  iterative reconstruction techniques. The specific techniques utilized on this CT  exam have been documented in the patient's electronic medical record.  _______________    FINDINGS:    VENTRICLES/EXTRA-AXIAL SPACES: The ventricles and sulci are normal in their size  and configuration. BRAIN PARENCHYMA: There is a large confluent area of abnormal parenchymal  hypodensity involving much of the right cerebral hemisphere involving both  cortex and white matter. There is involvement of right occipital lobe extending  into the parietal lobe and perhaps portion of the posterior temporal lobe. Some  of this area has suggestion of cortical volume loss, although not diffusely. There may be mild amount of adjacent sulcal effacement in the parietal region. More clearly delineated and cephalized changes are seen more anteriorly in the  right frontotemporal region with cortical atrophy and volume loss and underlying  hypodensity. Elongated hypodensity is seen involving the lateral aspect of the  right basal ganglia involving external capsule, portion of the lentiform  nucleus, and extending into the adjacent corona radiata suggesting chronic  infarct. Discrete small area of encephalomalacia seen in the contralateral posterior left  frontal cortex towards the vertex, such as axial image 26 suggesting small  chronic cortical infarct. Elongated hypodensity is seen involving the right  central diana suggesting axonal degeneration from chronic infarct. There is no evidence of acute intracranial hemorrhage, midline shift, or  herniation. ORBITS: The visualized orbits are unremarkable. PARANASAL SINUSES/MASTOIDS: Visualized paranasal sinuses are clear. Visualized  mastoid air cells are clear.     OSSEOUS STRUCTURES: No fracture is seen.    OTHER: None.      _______________         Impression IMPRESSION:    1. No acute intracranial hemorrhage, midline shift, or herniation. 2. Right frontotemporal and lateral basal ganglia encephalomalacic changes most  likely from chronic infarct. Additional small chronic posterior left frontal  cortical infarct was the vertex. 3. Right parieto-occipital parenchymal hypodensity very suspicious for infarct,  although more age indeterminate, suspect portions acute/subacute in age,  although there may be small areas of more chronic infarct present. No prior  comparison study. Please note that noncontrast head CT may be normal in early  acute infarct. 4. Mild nonspecific white matter disease likely representing chronic small  vessel changes. CRITICAL RESULT:    These critical results on this CODE S patient were directly communicated to Dr. Juaquin Vega at 2:21 PM on 5/8/2018. Results understood and acknowledged. XR (Most Recent). CXR reviewed by me and compared with previous CXR     Results from Hospital Encounter encounter on 05/08/18   XR CHEST PORT   Narrative INDICATION:  chest tube removed    COMPARISON:  5/10/2018 0852 hours    FINDINGS: A portable AP radiograph of the chest was obtained. The patient is on  a cardiac monitor. Endotracheal tubes and left neck central line appear unchanged. Mild elevated  right hemidiaphragm with likely trace effusion again seen. The lungs appear  otherwise clear. Artifacts are noted laterally at the left lung  base-costophrenic angle region. Minimal subcutaneous gas within the left lower  chest noted. Likely trace residual left apical pneumothorax without change. Impression IMPRESSION: No acute interval changes.           High complexity decision making was performed during the evaluation of this patient at high risk for decompensation with multiple organ involvement     Above mentioned total time spent on reviewing the case/medical record/data/notes/EMR/patient examination/documentation/coordinating care with nurse/consultants, exclusive of procedures with complex decision making performed and > 50% time spent in face to face evaluation.     Asa Blanton MD  5/11/2018

## 2018-05-11 NOTE — PROGRESS NOTES
Physical Exam   Genitourinary:         Skin:         Primary Nurse Lestine Seip, RN and Jenni Moore., RN performed a dual skin assessment on this patient .

## 2018-05-12 ENCOUNTER — APPOINTMENT (OUTPATIENT)
Dept: GENERAL RADIOLOGY | Age: 68
DRG: 871 | End: 2018-05-12
Attending: INTERNAL MEDICINE
Payer: MEDICARE

## 2018-05-12 LAB
ALBUMIN SERPL-MCNC: 1.4 G/DL (ref 3.4–5)
ANION GAP SERPL CALC-SCNC: 8 MMOL/L (ref 3–18)
BUN SERPL-MCNC: 58 MG/DL (ref 7–18)
BUN/CREAT SERPL: 41 (ref 12–20)
CALCIUM SERPL-MCNC: 7.6 MG/DL (ref 8.5–10.1)
CHLORIDE SERPL-SCNC: 122 MMOL/L (ref 100–108)
CO2 SERPL-SCNC: 20 MMOL/L (ref 21–32)
CREAT SERPL-MCNC: 1.42 MG/DL (ref 0.6–1.3)
ERYTHROCYTE [DISTWIDTH] IN BLOOD BY AUTOMATED COUNT: 15.3 % (ref 11.6–14.5)
GLUCOSE BLD STRIP.AUTO-MCNC: 152 MG/DL (ref 70–110)
GLUCOSE BLD STRIP.AUTO-MCNC: 202 MG/DL (ref 70–110)
GLUCOSE BLD STRIP.AUTO-MCNC: 221 MG/DL (ref 70–110)
GLUCOSE BLD STRIP.AUTO-MCNC: 221 MG/DL (ref 70–110)
GLUCOSE SERPL-MCNC: 200 MG/DL (ref 74–99)
HCT VFR BLD AUTO: 29.3 % (ref 36–48)
HGB BLD-MCNC: 9.4 G/DL (ref 13–16)
MCH RBC QN AUTO: 27.8 PG (ref 24–34)
MCHC RBC AUTO-ENTMCNC: 32.1 G/DL (ref 31–37)
MCV RBC AUTO: 86.7 FL (ref 74–97)
PHOSPHATE SERPL-MCNC: 2.1 MG/DL (ref 2.5–4.9)
PLATELET # BLD AUTO: 117 K/UL (ref 135–420)
PMV BLD AUTO: 11 FL (ref 9.2–11.8)
POTASSIUM SERPL-SCNC: 3.3 MMOL/L (ref 3.5–5.5)
RBC # BLD AUTO: 3.38 M/UL (ref 4.7–5.5)
SODIUM SERPL-SCNC: 149 MMOL/L (ref 136–145)
SODIUM SERPL-SCNC: 150 MMOL/L (ref 136–145)
VANCOMYCIN SERPL-MCNC: 23.9 UG/ML (ref 5–40)
WBC # BLD AUTO: 12.1 K/UL (ref 4.6–13.2)

## 2018-05-12 PROCEDURE — 77030037878 HC DRSG MEPILEX >48IN BORD MOLN -B

## 2018-05-12 PROCEDURE — 84295 ASSAY OF SERUM SODIUM: CPT | Performed by: FAMILY MEDICINE

## 2018-05-12 PROCEDURE — 74011250636 HC RX REV CODE- 250/636: Performed by: INTERNAL MEDICINE

## 2018-05-12 PROCEDURE — 80069 RENAL FUNCTION PANEL: CPT | Performed by: INTERNAL MEDICINE

## 2018-05-12 PROCEDURE — 71045 X-RAY EXAM CHEST 1 VIEW: CPT

## 2018-05-12 PROCEDURE — 65660000001 HC RM ICU INTERMED STEPDOWN

## 2018-05-12 PROCEDURE — 82962 GLUCOSE BLOOD TEST: CPT

## 2018-05-12 PROCEDURE — 74011250636 HC RX REV CODE- 250/636: Performed by: FAMILY MEDICINE

## 2018-05-12 PROCEDURE — 80202 ASSAY OF VANCOMYCIN: CPT | Performed by: FAMILY MEDICINE

## 2018-05-12 PROCEDURE — 77030018798 HC PMP KT ENTRL FED COVD -A

## 2018-05-12 PROCEDURE — 74011250637 HC RX REV CODE- 250/637: Performed by: INTERNAL MEDICINE

## 2018-05-12 PROCEDURE — 85027 COMPLETE CBC AUTOMATED: CPT | Performed by: FAMILY MEDICINE

## 2018-05-12 PROCEDURE — 74011636637 HC RX REV CODE- 636/637: Performed by: INTERNAL MEDICINE

## 2018-05-12 PROCEDURE — 74011636637 HC RX REV CODE- 636/637: Performed by: FAMILY MEDICINE

## 2018-05-12 PROCEDURE — 77030011256 HC DRSG MEPILEX <16IN NO BORD MOLN -A

## 2018-05-12 RX ORDER — POTASSIUM CHLORIDE 1.5 G/1.77G
40 POWDER, FOR SOLUTION ORAL
Status: COMPLETED | OUTPATIENT
Start: 2018-05-12 | End: 2018-05-12

## 2018-05-12 RX ADMIN — HYDRALAZINE HYDROCHLORIDE 10 MG: 20 INJECTION INTRAMUSCULAR; INTRAVENOUS at 02:57

## 2018-05-12 RX ADMIN — HEPARIN SODIUM 5000 UNITS: 5000 INJECTION, SOLUTION INTRAVENOUS; SUBCUTANEOUS at 18:29

## 2018-05-12 RX ADMIN — Medication 10 ML: at 06:05

## 2018-05-12 RX ADMIN — INSULIN GLARGINE 3 UNITS: 100 INJECTION, SOLUTION SUBCUTANEOUS at 21:48

## 2018-05-12 RX ADMIN — INSULIN LISPRO 4 UNITS: 100 INJECTION, SOLUTION INTRAVENOUS; SUBCUTANEOUS at 17:03

## 2018-05-12 RX ADMIN — FAMOTIDINE 20 MG: 20 TABLET ORAL at 08:17

## 2018-05-12 RX ADMIN — INSULIN LISPRO 4 UNITS: 100 INJECTION, SOLUTION INTRAVENOUS; SUBCUTANEOUS at 11:33

## 2018-05-12 RX ADMIN — SODIUM CHLORIDE 1000 MG: 900 INJECTION, SOLUTION INTRAVENOUS at 13:30

## 2018-05-12 RX ADMIN — INSULIN LISPRO 2 UNITS: 100 INJECTION, SOLUTION INTRAVENOUS; SUBCUTANEOUS at 00:33

## 2018-05-12 RX ADMIN — Medication 10 ML: at 13:31

## 2018-05-12 RX ADMIN — HEPARIN SODIUM 5000 UNITS: 5000 INJECTION, SOLUTION INTRAVENOUS; SUBCUTANEOUS at 10:57

## 2018-05-12 RX ADMIN — INSULIN LISPRO 4 UNITS: 100 INJECTION, SOLUTION INTRAVENOUS; SUBCUTANEOUS at 06:04

## 2018-05-12 RX ADMIN — COLLAGENASE SANTYL: 250 OINTMENT TOPICAL at 08:17

## 2018-05-12 RX ADMIN — INSULIN GLARGINE 3 UNITS: 100 INJECTION, SOLUTION SUBCUTANEOUS at 08:16

## 2018-05-12 RX ADMIN — HEPARIN SODIUM 5000 UNITS: 5000 INJECTION, SOLUTION INTRAVENOUS; SUBCUTANEOUS at 02:57

## 2018-05-12 RX ADMIN — Medication 10 ML: at 21:44

## 2018-05-12 RX ADMIN — HYDROCORTISONE SODIUM SUCCINATE 50 MG: 100 INJECTION, POWDER, FOR SOLUTION INTRAMUSCULAR; INTRAVENOUS at 08:17

## 2018-05-12 RX ADMIN — ASPIRIN 325 MG ORAL TABLET 325 MG: 325 PILL ORAL at 08:17

## 2018-05-12 RX ADMIN — HYDROCORTISONE SODIUM SUCCINATE 50 MG: 100 INJECTION, POWDER, FOR SOLUTION INTRAMUSCULAR; INTRAVENOUS at 21:44

## 2018-05-12 RX ADMIN — POTASSIUM CHLORIDE 40 MEQ: 1.5 POWDER, FOR SOLUTION ORAL at 13:31

## 2018-05-12 NOTE — PROGRESS NOTES
Problem: Falls - Risk of  Goal: *Absence of Falls  Document Blanca Fall Risk and appropriate interventions in the flowsheet. Outcome: Progressing Towards Goal  Fall Risk Interventions:       Mentation Interventions: Bed/chair exit alarm, Adequate sleep, hydration, pain control    Medication Interventions: Bed/chair exit alarm, Evaluate medications/consider consulting pharmacy    Elimination Interventions: Toileting schedule/hourly rounds             Problem: Pressure Injury - Risk of  Goal: *Prevention of pressure injury  Document Rene Scale and appropriate interventions in the flowsheet.    Outcome: Progressing Towards Goal  Pressure Injury Interventions:  Sensory Interventions: Assess changes in LOC, Check visual cues for pain    Moisture Interventions: Absorbent underpads    Activity Interventions: Increase time out of bed    Mobility Interventions: Pressure redistribution bed/mattress (bed type), HOB 30 degrees or less    Nutrition Interventions: Document food/fluid/supplement intake    Friction and Shear Interventions: Feet elevated on foot rest, Apply protective barrier, creams and emollients, Foam dressings/transparent film/skin sealants

## 2018-05-12 NOTE — PROGRESS NOTES
Pharmacy Dosing Services: Vancomycin    Indication: BSI    Day of therapy: 3    Other Antimicrobials (Include dose, start day & day of therapy):  Piperacillin-Tazobactam 3.375 grams every 6 hours, 2018 - completed     Loading dose (date given): 1,750 mg  Current Maintenance dose: Dosing per level    Goal Vancomycin Level: 15-20 mcg/mL  (Trough 15-20 for most infections, 20 for meningitis/osteomyelitis, pre-HD level ~25)    Vancomycin Level (if drawn):   - 14.8 (~24 hours post-dose) - ACTION:  given 1250 mg  5/10 - 21.8 (~14 hours post dose) - ACTION: given 1000 mg   - 24 (~13 hours post-dose)  -23.9 (~14.5 hrs post dose)    Significant Cultures:    Blood - Staph hominis    Renal function stable? (unstable defined as SCr increase of 0.5 mg/dL or > 50% increase from baseline, whichever is greater) (Y/N): N     CAPD, Hemodialysis or Renal Replacement Therapy (Y/N): N     Recent Labs      18   0330  18   0350  05/10/18   0225   CREA  1.42*  2.14*  2.86*   BUN  58*  79*  97*   WBC   --   11.3  10.3     Temp (24hrs), Av.7 °F (33.7 °C), Min:36.8 °F (2.7 °C), Max:98.2 °F (36.8 °C)    Creatinine Clearance (Creatinine Clearance (ml/min)): 54 ml/min    Regimen assessment:   - Renal function slowly improving - no HD for now  - Continue dosing per level for now. Maintenance dose: Will give 1000 mg once today    Next scheduled level:  at 0400       Pharmacy will follow daily and adjust medications as appropriate for renal function and/or serum levels.     Thank you,  Salli Cabot, PHARMD

## 2018-05-12 NOTE — PROGRESS NOTES
Daily Progress Note: 5/12/2018 3:33 PM   Admit Date: 5/8/2018    Patient seen in follow up for multiple medical problems as listed below:  Patient Active Problem List   Diagnosis Code    Res[facility] failure ([facility] J96.90    Encephalopathy G93.40    Dehydration E86.0    Lactic acidosis E87.2    Hypernatremia E87.0    SIMIN (acute kidney injury) ([facility] N17.9    CVA (cerebral vascular accident) ([facility] I63.9    Sepsis ([facility] A41.9    Elevated troponin R74.8    Postprocedural pneumothorax J95.811    Hyperglycemia R73.9    Elevated CK R74.8    Bacteremia R78.81       Garrett Carroll is a 76 y. o. male who presents via EMS unresponsive . Patient  is a resident at [address] [place] [address] with hx stroke with Left hemiparesis and G tube. 02 Sat 40's in the field and was intubated by EMS PTA. In the ED code S was called. Seen by tele neurology. CT head was suspect for acute/subacute infarct. Patient MRI brain does not show acute infarct . He is improving and able to follow some commands. Extubated 5/11. SIMIN present, slowly improving with IVF. Blood cultures showing Staph Hominis, ID consulting. Hypernatremia present, slowly improving. RLE edema   - PVL x1     Encephalopathy   - metabolic, resolving   - MRI brain negative for acute infarct  - Neurology consulting     Respiratory Failure   - intubated PTA   - Intensivist consulted   - extubated 5/11, wean 02 as tolerated      Bacteremia   + GPC clusters->staph hominis  - Vanco   - ID following, TTE 5/9      Hypernatremia   - ->155->154 ->150  - IVF stopped.      Lactic acidosis   - 2/2 hypovolemia , sepsis       SIMIN  - Prerenal . Cr 3.5 ->1. 43      Elevated troponin  - demand       Hx stroke   - MRI brain shows chronic infarction . No acute infarct      Pneumothorax  - chest tube out  - surgery following      Diarrhea   - check for C diff if continues.      DVT Protocol Active: yes  Code Status:  Full Code     Disposition: out of ICU Subjective:     CC: Unresponsive    Interval History: repeat blood Cx negative. Cr and Na improving. Mentation improving. ROS: 11 point ROS negative     Objective:     Visit Vitals    /79    Pulse 85    Temp (!) 36.7 °F (2.6 °C)    Resp 21    Ht 6' (1.829 m)    Wt 77.5 kg (170 lb 14.4 oz)    SpO2 100%    BMI 23.18 kg/m2       Temp (24hrs), Av °F (35 °C), Min:36.7 °F (2.6 °C), Max:98.1 °F (36.7 °C)        Intake/Output Summary (Last 24 hours) at 18 1533  Last data filed at 18 1500   Gross per 24 hour   Intake             5645 ml   Output             1975 ml   Net             3670 ml       Gen: NAD + NC  HEENT:  CRISTIANO, EOMI. Neck: No Bruits/JVD   Lungs:   CTAB anteriorly  Heart:   RR S1 S2 without M/R/G  Abdomen: ND,NT, BSX4,   Extremities:   RLE swelling and erythema. No cyanosis.   Skin:  no jaundice/lesions      Data Review:     Meds/Labs/Tests reviewed    Current Shift:  701 - 1900  In: 1822 [I.V.:320]  Out: 615 [Urine:615]  Last three shifts:  05/10 1901 -  0700  In: 8290 [I.V.:2360]  Out: 2845 [Urine:2145; Drains:700]  Recent Labs      18   0330  18   0350  05/10/18   0225   WBC  12.1  11.3  10.3   RBC  3.38*  2.98*  3.24*   HGB  9.4*  8.4*  9.1*   HCT  29.3*  26.3*  29.1*   PLT  117*  102*  119*   GRANS   --   89*  85*   LYMPH   --   7*  12*   EOS   --   0  0       Recent Labs      18   0330  18   1539  18   0350  05/10/18   2047  05/10/18   0810  05/10/18   0225  05/10/18   0006  18   1816   BUN  58*   --   79*   --    --   97*   --    --    CREA  1.42*   --   2.14*   --    --   2.86*   --    --    CA  7.6*   --   7.3*   --    --   7.6*   --    --    ALB  1.4*   --   1.3*   --    --   1.3*   --    --    K  3.3*   --   3.5   --    --   4.1   --    --    NA  150*  154*  154*  152*  157*  156*  157*  154*   CL  122*   --   126*   --    --   126*   --    --    CO2  20*   --   20*   --    --   20*   --    --    PHOS  2.1* --   2.8   --    --   4.4   --    --    GLU  200*   --   204*   --    --   181*   --    --         Lab Results   Component Value Date/Time    Glucose 200 (H) 05/12/2018 03:30 AM    Glucose 204 (H) 05/11/2018 03:50 AM    Glucose 181 (H) 05/10/2018 02:25 AM    Glucose 209 (H) 05/09/2018 03:55 AM    Glucose 177 (H) 05/08/2018 02:44 PM          Care coordination with Nursing/Consultants/staff: 15  Prior history, labs, and charting reviewed: 20    Procedures/Imaging:  LE PVL 5/13    Total time spent with chart review, patient examination/education, discussion with staff on case,documentation and medication management / adjustment  :  39 Minutes      Dr Sang Rodríguez  [phone]

## 2018-05-12 NOTE — PROGRESS NOTES
PCCM Progress Note                                              I have reviewed the flowsheet and previous days notes. Events, vitals, medications and notes from last 24 hours reviewed. Care plan discussed with staff and on multidisciplinary rounds. Subjective:  5/12/2018   Pt extubated yesterday. Doing ok post extubation. Pt says he is doing ok. Denies dyspnea or chest pain. Impression and Plan  S/p VDRF - Pt extubated yesterday. Saturating well on RA  Hx of CVA. MRI brain on 5/9/18 shows subacute and chronic infarcts involving the right cerebral hemisphere, Small chronic infarct left frontal lobe. MRA brain shows No gross abnormality. Neurology note says stroke was not acute. Sepsis - Pt is afebrile today. Blood cx from 5/8/18 shows Staph Hominis. Likely source is skin wounds. ID is now on board. Blood cx repeated yesterday. Pt is on Vanco. Defer to ID. SIMIN - Slightly better today. F/u with nephrology  Hypernatremia - Sodium is slowly coming down. Continue free water flushes  S/p Left small pneumothorax - CT removed by surgery. F/u repeat Cxr today. DVT and GI proph - Cont heparin and pepcid  Nutrition - Cont tube feeds, its at goal today  Hyperglycemia - ISS and lantus. Nutrition consulted  Chronic sawant - Urology was consulted    OTHER:  Glycemic Control. Glucose stabilizer per ICU protocol when on insulin drip. Maintain blood glucose 140-180. Replace electrolytes per ICU electrolyte replacement protocol  HOB >=30 degree elevation all the time. Aggressive pulmonary toileting. Incentive spirometry when appropriate. Aspiration precautions. Sepsis bundle and protocol followed. Deescalate antibiotic when appropriate. Central Line bundle followed, remove when not needed. Large bore IV line or CVP when appropriate. Sawant bundle followed, remove sawant catheter when not critically ill. PT/OT eval and treat. OOB/IS when appropriate.      Quality Care: Stress ulcer prophylaxis, DVT prophylaxis, HOB elevated, Infection control all reviewed and addressed. Events and notes from last 24 hours reviewed. Care plan discussed with nursing. D/w patient's family above medical problems and answered all questions to their satisfaction. CC TIME: >35 min     Medication Reviewed:    No Known Allergies   No past medical history on file. No past surgical history on file. Social History   Substance Use Topics    Smoking status: Not on file    Smokeless tobacco: Not on file    Alcohol use Not on file      No family history on file. Prior to Admission medications    Not on File     Current Facility-Administered Medications   Medication Dose Route Frequency    vancomycin (VANCOCIN) 1,000 mg in 0.9% sodium chloride (MBP/ADV) 250 mL adv  1,000 mg IntraVENous ONCE    [START ON 5/13/2018] VANCOMYCIN INFORMATION NOTE   Other ONCE    famotidine (PEPCID) tablet 20 mg  20 mg Oral DAILY    aspirin (ASPIRIN) tablet 325 mg  325 mg Oral DAILY    insulin glargine (LANTUS) injection 3 Units  3 Units SubCUTAneous Q12H    hydrocortisone Sod Succ (PF) (SOLU-CORTEF) injection 50 mg  50 mg IntraVENous Q12H    collagenase (SANTYL) 250 unit/gram ointment   Topical DAILY    insulin lispro (HUMALOG) injection   SubCUTAneous Q6H    Vancomyicn pharmacy dosing per level   Other Rx Dosing/Monitoring    sodium chloride (NS) flush 5-10 mL  5-10 mL IntraVENous Q8H    heparin (porcine) injection 5,000 Units  5,000 Units SubCUTAneous Q8H       Lines: All central lines examined by me. No signs of erythema, induration, discharge.      Central Venous Catheter:  Triple Lumen 05/08/18 Left Internal jugular (Active)   Central Line Being Utilized Yes 5/9/2018  4:00 AM   Criteria for Appropriate Use Hemodynamically unstable, requiring monitoring lines, vasopressors, or volume resuscitation 5/9/2018  4:00 AM   Site Assessment Clean, dry, & intact 5/9/2018  4:00 AM   Infiltration Assessment 0 5/9/2018  4:00 AM   Affected Extremity/Extremities Color distal to insertion site pink (or appropriate for race); Pulses palpable 5/9/2018  4:00 AM   Date of Last Dressing Change 05/09/18 5/9/2018  4:00 AM   Dressing Status Clean, dry, & intact 5/9/2018  4:00 AM   Dressing Type Tape;Disk with Chlorhexadine gluconate (CHG); Transparent 5/9/2018  4:00 AM   Action Taken Open ports on tubing capped 5/9/2018  4:00 AM   Proximal Hub Color/Line Status Brown; Infusing 5/9/2018  4:00 AM   Positive Blood Return (Medial Site) Yes 5/9/2018  4:00 AM   Medial Hub Color/Line Status White; Infusing 5/9/2018  4:00 AM   Positive Blood Return (Lateral Site) Yes 5/9/2018  4:00 AM   Distal Hub Color/Line Status Blue;Capped 5/9/2018  4:00 AM   Positive Blood Return (Site #3) Yes 5/9/2018  4:00 AM   Alcohol Cap Used Yes 5/9/2018  4:00 AM     Peripheral Intravenous Line:  Peripheral IV 05/08/18 Left Antecubital (Active)   Site Assessment Clean, dry, & intact 5/9/2018  7:00 AM   Phlebitis Assessment 0 5/9/2018  7:00 AM   Infiltration Assessment 0 5/9/2018  7:00 AM   Dressing Status Clean, dry, & intact 5/9/2018  7:00 AM   Dressing Type Tape;Transparent 5/9/2018  4:00 AM   Hub Color/Line Status Green; Infusing 5/9/2018  4:00 AM   Action Taken Open ports on tubing capped 5/9/2018  4:00 AM   Alcohol Cap Used No 5/9/2018  4:00 AM   Drain(s):  PEG/Gastrostomy Tube 05/08/18 (Active)   Site Assessment Clean, dry, & intact 5/9/2018  4:00 AM   Dressing Status Clean, dry, & intact 5/9/2018  4:00 AM   G Port Status Clamped 5/9/2018  4:00 AM     Airway:  Airway - Endotracheal Tube 05/08/18 Oral (Active)   Insertion Depth (cm) 26 cm 5/9/2018  7:14 AM   Line Gee Lips 5/9/2018  7:14 AM   Side Secured Left 5/9/2018  7:14 AM   Cuff Pressure 28 cmH20 5/9/2018  7:14 AM   Site Assessment Clean, dry, & intact 5/9/2018  7:14 AM       Objective:  Vital Signs:    Visit Vitals    /53    Pulse 82    Temp (!) 36.3 °F (2.4 °C)    Resp 14    Ht 6' (1.829 m)    Wt 77.5 kg (170 lb 14.4 oz)    SpO2 99%    BMI 23.18 kg/m2      O2 Device: Room air  O2 Flow Rate (L/min): 2 l/min  Temp (24hrs), Av °F (35 °C), Min:36.3 °F (2.4 °C), Max:98.2 °F (36.8 °C)      Intake/Output:   Last shift:      701 - 1900  In: 620 [I.V.:70]  Out: 240 [Urine:240]    Last 3 shifts: 05/10 1901 -  0700  In: 8290 [I.V.:2360]  Out: 5011 [Urine:2145; Drains:700]      Intake/Output Summary (Last 24 hours) at 18 1132  Last data filed at 18 0900   Gross per 24 hour   Intake             6235 ml   Output             1820 ml   Net             4415 ml       Last 3 Recorded Weights in this Encounter    05/09/18 1954 05/10/18 2029 05/11/18 2058   Weight: 75.2 kg (165 lb 12.8 oz) 74.8 kg (164 lb 12.8 oz) 77.5 kg (170 lb 14.4 oz)       Ventilator Settings:  Mode Rate Tidal Volume Pressure FiO2 PEEP  Pressure support   45 ml  7 cm H2O 25 % 5 cm H20    Peak airway pressure: 13 cm H2O   Plateau pressure:    Tidal volume:   Minute ventilation: 9.5 l/min  SPO2     ARDS network Guidelines: Lung protective strategy and Plateau pressure goals less than or equal to 30    Physical Exam:     General/Neurology: Awake, answers questions  Head:   Normocephalic, without obvious abnormality  Eye:   PERRL, no scleral icterus, no pallor  Oral:   Mucus membranes moist  Neck:   Supple  Lung:   B/l air entry is decreased  Heart:   Regular rate & rhythm. S1 S2 present.    Abdomen/: Soft, non tender, BS +nt  Extremities:  Min pedal edema  Skin:  Ch stases changes present    Data:      Recent Results (from the past 24 hour(s))   CULTURE, BLOOD    Collection Time: 18 12:40 PM   Result Value Ref Range    Special Requests: PERIPHERAL      Culture result: PENDING    CULTURE, BLOOD    Collection Time: 18 12:42 PM   Result Value Ref Range    Special Requests: PERIPHERAL      Culture result: PENDING    GLUCOSE, POC    Collection Time: 18 12:57 PM   Result Value Ref Range    Glucose (POC) 187 (H) 70 - 110 mg/dL   SODIUM    Collection Time: 05/11/18  3:39 PM   Result Value Ref Range    Sodium 154 (H) 136 - 145 mmol/L   GLUCOSE, POC    Collection Time: 05/11/18  6:30 PM   Result Value Ref Range    Glucose (POC) 151 (H) 70 - 110 mg/dL   GLUCOSE, POC    Collection Time: 05/12/18 12:27 AM   Result Value Ref Range    Glucose (POC) 152 (H) 70 - 110 mg/dL   RENAL FUNCTION PANEL    Collection Time: 05/12/18  3:30 AM   Result Value Ref Range    Sodium 150 (H) 136 - 145 mmol/L    Potassium 3.3 (L) 3.5 - 5.5 mmol/L    Chloride 122 (H) 100 - 108 mmol/L    CO2 20 (L) 21 - 32 mmol/L    Anion gap 8 3.0 - 18 mmol/L    Glucose 200 (H) 74 - 99 mg/dL    BUN 58 (H) 7.0 - 18 MG/DL    Creatinine 1.42 (H) 0.6 - 1.3 MG/DL    BUN/Creatinine ratio 41 (H) 12 - 20      GFR est AA >60 >60 ml/min/1.73m2    GFR est non-AA 50 (L) >60 ml/min/1.73m2    Calcium 7.6 (L) 8.5 - 10.1 MG/DL    Phosphorus 2.1 (L) 2.5 - 4.9 MG/DL    Albumin 1.4 (L) 3.4 - 5.0 g/dL   VANCOMYCIN, RANDOM    Collection Time: 05/12/18  3:30 AM   Result Value Ref Range    Vancomycin, random 23.9 5.0 - 40.0 UG/ML   CBC W/O DIFF    Collection Time: 05/12/18  3:30 AM   Result Value Ref Range    WBC 12.1 4.6 - 13.2 K/uL    RBC 3.38 (L) 4.70 - 5.50 M/uL    HGB 9.4 (L) 13.0 - 16.0 g/dL    HCT 29.3 (L) 36.0 - 48.0 %    MCV 86.7 74.0 - 97.0 FL    MCH 27.8 24.0 - 34.0 PG    MCHC 32.1 31.0 - 37.0 g/dL    RDW 15.3 (H) 11.6 - 14.5 %    PLATELET 257 (L) 579 - 420 K/uL    MPV 11.0 9.2 - 11.8 FL   GLUCOSE, POC    Collection Time: 05/12/18  6:00 AM   Result Value Ref Range    Glucose (POC) 221 (H) 70 - 110 mg/dL         Chemistry   Recent Labs      05/12/18   0330  05/11/18   1539  05/11/18   0350   05/10/18   0225   GLU  200*   --   204*   --   181*   NA  150*  154*  154*   < >  156*   K  3.3*   --   3.5   --   4.1   CL  122*   --   126*   --   126*   CO2  20*   --   20*   --   20*   BUN  58*   --   79*   --   97*   CREA  1.42*   --   2.14*   --   2.86*   CA  7.6*   --   7.3*   --   7.6*   MG   --    --    --    --   2.6   PHOS 2.1*   --   2.8   --   4.4   AGAP  8   --   8   --   10   BUCR  41*   --   37*   --   34*   AP   --    --    --    --   76   TP   --    --    --    --   5.2*   ALB  1.4*   --   1.3*   --   1.3*   GLOB   --    --    --    --   3.9   AGRAT   --    --    --    --   0.3*    < > = values in this interval not displayed. CBC w/Diff   Recent Labs      05/12/18   0330  05/11/18   0350  05/10/18   0225   WBC  12.1  11.3  10.3   RBC  3.38*  2.98*  3.24*   HGB  9.4*  8.4*  9.1*   HCT  29.3*  26.3*  29.1*   PLT  117*  102*  119*   GRANS   --   89*  85*   LYMPH   --   7*  12*   EOS   --   0  0       ABG    Recent Labs      05/11/18   1104  05/10/18   1032  05/10/18   0616   PHI  7.371  7.340*  7.364   PCO2I  34.1*  31.9*  32.0*   PO2I  97  80  89   HCO3I  19.7*  17.4*  18.5*   FIO2I  25  0.30  30       Micro     Recent Labs      05/11/18   1242  05/11/18   1240   CULT  PENDING  PENDING     Recent Labs      05/11/18   1242  05/11/18   1240   CULT  PENDING  PENDING       CT (Most Recent)      Results from [place] encounter on 05/08/18   CT HEAD WO CONT   Narrative EXAM: CT head    INDICATION: Unresponsive    COMPARISON: No prior comparison study. TECHNIQUE: Axial CT imaging of the head was performed without intravenous  contrast.  Additional coronal and sagittal reconstructions were performed. One  or more dose reduction techniques were used on this CT: automated exposure  control, adjustment of the mAs and/or kVp according to patient's size, and  iterative reconstruction techniques. The specific techniques utilized on this CT  exam have been documented in the patient's electronic medical record.  _______________    FINDINGS:    VENTRICLES/EXTRA-AXIAL SPACES: The ventricles and sulci are normal in their size  and configuration. BRAIN PARENCHYMA: There is a large confluent area of abnormal parenchymal  hypodensity involving much of the right cerebral hemisphere involving both  cortex and white matter.  There is involvement of right occipital lobe extending  into the parietal lobe and perhaps portion of the posterior temporal lobe. Some  of this area has suggestion of cortical volume loss, although not diffusely. There may be mild amount of adjacent sulcal effacement in the parietal region. More clearly delineated and cephalized changes are seen more anteriorly in the  right frontotemporal region with cortical atrophy and volume loss and underlying  hypodensity. Elongated hypodensity is seen involving the lateral aspect of the  right basal ganglia involving external capsule, portion of the lentiform  nucleus, and extending into the adjacent corona radiata suggesting chronic  infarct. Discrete small area of encephalomalacia seen in the contralateral posterior left  frontal cortex towards the vertex, such as axial image 26 suggesting small  chronic cortical infarct. Elongated hypodensity is seen involving the right  central diana suggesting axonal degeneration from chronic infarct. There is no evidence of acute intracranial hemorrhage, midline shift, or  herniation. ORBITS: The visualized orbits are unremarkable. PARANASAL SINUSES/MASTOIDS: Visualized paranasal sinuses are clear. Visualized  mastoid air cells are clear. OSSEOUS STRUCTURES: No fracture is seen. OTHER: None.      _______________         Impression IMPRESSION:    1. No acute intracranial hemorrhage, midline shift, or herniation. 2. Right frontotemporal and lateral basal ganglia encephalomalacic changes most  likely from chronic infarct. Additional small chronic posterior left frontal  cortical infarct was the vertex. 3. Right parieto-occipital parenchymal hypodensity very suspicious for infarct,  although more age indeterminate, suspect portions acute/subacute in age,  although there may be small areas of more chronic infarct present. No prior  comparison study.  Please note that noncontrast head CT may be normal in early  acute infarct. 4. Mild nonspecific white matter disease likely representing chronic small  vessel changes. CRITICAL RESULT:    These critical results on this CODE S patient were directly communicated to Dr. Andriy Ferguson at 2:21 PM on 5/8/2018. Results understood and acknowledged. XR (Most Recent). CXR reviewed by me and compared with previous CXR     Results from Hospital Encounter encounter on 05/08/18   XR CHEST PORT   Narrative Chest, single view    Indication: Follow-up chest tube removal, left apical pneumothorax, patient on  vent    Comparison: Multiple prior studies most recent 5/10/2018    Findings:  Portable upright AP view of the chest was obtained. Cardiac leads overlie the chest. Endotracheal tube terminates 2.8 cm above the  jerson, left neck central venous catheter within the innominate vein near the  junction with the SVC, unchanged. The cardiomediastinal silhouette is stable. The pulmonary vasculature is  unremarkable. Developing airspace opacity in the right mid and lower lung zone. Probable tiny residual left apical pneumothorax, unchanged. No acute osseous  abnormality. Impression Impression:  1. Probable very minimal residual left apical pneumothorax. 2. Interval development right lower lung airspace disease. Suspect aspiration. Pneumonia also a consideration. 3. ETT and left central venous catheter, as above. As the attending radiologist, I have personally reviewed the study, and I agree  with the findings as reported/edited. High complexity decision making was performed during the evaluation of this patient at high risk for decompensation with multiple organ involvement     Above mentioned total time spent on reviewing the case/medical record/data/notes/EMR/patient examination/documentation/coordinating care with nurse/consultants, exclusive of procedures with complex decision making performed and > 50% time spent in face to face evaluation.     Tony Rehan Mendez MD  5/12/2018

## 2018-05-12 NOTE — PROGRESS NOTES
0730- Report received from [facility] JAEL WILLIS and assumed care of patient. Patient alert and oriented to self. No signs of distress or reports of pain. Will continue to monitor. 0800- Flowers cath care provided at this time. 1000- Patient resting comfortably. VS stable. No signs of distress. Will continue to monitor. 1200- Free water flushes via tube feed changed from 100cc q1 to 250cc q4. Will continue to monitor. 1230- Flowers cath care provided at this time. 1430- Patient resting comfortably. VS stable. No signs of distress. Will continue to monitor. 1600- Flowers cath care provided at this time. Bedside and Verbal shift change report given to [facility] JOSE WILLIS RN  (oncoming nurse) by Do Benavidez RN  (offgoing nurse). Report included the following information SBAR, Kardex, Intake/Output, MAR, Recent Results and Cardiac Rhythm SR w/ PVC.

## 2018-05-12 NOTE — PROGRESS NOTES
Problem: Falls - Risk of  Goal: *Absence of Falls  Document Blanca Fall Risk and appropriate interventions in the flowsheet. Outcome: Progressing Towards Goal  Fall Risk Interventions:       Mentation Interventions: Bed/chair exit alarm, Door open when patient unattended, More frequent rounding, Reorient patient, Room close to nurse's station    Medication Interventions: Bed/chair exit alarm, Patient to call before getting OOB    Elimination Interventions: Call light in reach, Bed/chair exit alarm, Patient to call for help with toileting needs, Toileting schedule/hourly rounds             Problem: Pressure Injury - Risk of  Goal: *Prevention of pressure injury  Document Rene Scale and appropriate interventions in the flowsheet. Outcome: Progressing Towards Goal  Pressure Injury Interventions:  Sensory Interventions: Assess changes in LOC, Assess need for specialty bed, Check visual cues for pain, Turn and reposition approx. every two hours (pillows and wedges if needed)    Moisture Interventions: Absorbent underpads, Assess need for specialty bed, Maintain skin hydration (lotion/cream)    Activity Interventions: Assess need for specialty bed, Increase time out of bed    Mobility Interventions: Pressure redistribution bed/mattress (bed type), Turn and reposition approx.  every two hours(pillow and wedges), HOB 30 degrees or less    Nutrition Interventions: Document food/fluid/supplement intake    Friction and Shear Interventions: HOB 30 degrees or less

## 2018-05-12 NOTE — PROGRESS NOTES
In Patient Progress note      Admit Date: 5/8/2018    Impression:     1) nonoliguric SIMIN d/t ischemic atn in a setting of volume depletion/sepsis/resp failure      baseline creat not known , but has been improving , continue tube feeds and free water  2) Hypernatremia. improving free water with tube feeds, agree with 250cc q4hrs   3) hypokalemia , replace and check  3) Mild met acidosis. Monitor at this time. 4) HTN. Continue amlodipine   5) Sepsis. Staph hominis , Ab per ID  /PCCM   6) Lt iatrogenic pneumothorax. improved  7) Resp failure. Improved                   Discussed with patients nurse.                   Please call with questions,     Ehsan Francis MD FASN   Cell [phone]      Current Facility-Administered Medications:     vancomycin (VANCOCIN) 1,000 mg in 0.9% sodium chloride (MBP/ADV) 250 mL adv, 1,000 mg, IntraVENous, ONCE, Do York DO, Last Rate: 125 mL/hr at 05/12/18 1330, 1,000 mg at 05/12/18 1330    [START ON 5/13/2018] VANCOMYCIN INFORMATION NOTE, , Other, ONCE, Do York DO    famotidine (PEPCID) tablet 20 mg, 20 mg, Oral, DAILY, Rosa Kearney MD, 20 mg at 05/12/18 0817    aspirin (ASPIRIN) tablet 325 mg, 325 mg, Oral, DAILY, Rosa Kearney MD, 325 mg at 05/12/18 0817    insulin glargine (LANTUS) injection 3 Units, 3 Units, SubCUTAneous, Q12H, Rosa Kearney MD, 3 Units at 05/12/18 0816    hydrALAZINE (APRESOLINE) 20 mg/mL injection 10 mg, 10 mg, IntraVENous, Q6H PRN, Kiki Johns MD, 10 mg at 05/12/18 0257    hydrocortisone Sod Succ (PF) (SOLU-CORTEF) injection 50 mg, 50 mg, IntraVENous, Q12H, Rosa Kearney MD, 50 mg at 05/12/18 0817    glucose chewable tablet 16 g, 4 Tab, Oral, PRN, Kiki Johns MD    glucagon (GLUCAGEN) injection 1 mg, 1 mg, IntraMUSCular, PRN, Kiki Johns MD    dextrose (D50W) injection syrg 12.5-25 g, 25-50 mL, IntraVENous, PRN, Kiki Johns MD, 12.5 g at 05/10/18 1313   collagenase (SANTYL) 250 unit/gram ointment, , Topical, DAILY, Laina Fairchild MD    insulin lispro (HUMALOG) injection, , SubCUTAneous, Q6H, Abby Breaux MD, 4 Units at 05/12/18 1133    sodium chloride (NS) flush 5-10 mL, 5-10 mL, IntraVENous, PRN, Liz Doss MD, 10 mL at 05/10/18 0511  [address] [facility] dosing per level, , Other, Rx Dosing/Monitoring, Liz Doss MD    sodium chloride (NS) flush 5-10 mL, 5-10 mL, IntraVENous, Q8H, Laina Fairchild MD, 10 mL at 05/12/18 1331    sodium chloride (NS) flush 5-10 mL, 5-10 mL, IntraVENous, PRN, Abby Breaux MD, 10 mL at 05/09/18 2225    heparin (porcine) injection 5,000 Units, 5,000 Units, SubCUTAneous, Q8H, Abby Breaux MD, 5,000 Units at 05/12/18 1057    midazolam (VERSED) injection 1 mg, 1 mg, IntraVENous, Q3H PRN, Pedro Pablo Perry MD, 1 mg at 05/09/18 1346    fentaNYL citrate (PF) injection 50 mcg, 50 mcg, IntraVENous, Q4H PRN, Pedro Pablo Perry MD, 50 mcg at 05/09/18 1545        Objective:     Sleeping         Visit Vitals    /79    Pulse 85    Temp (!) 36.7 °F (2.6 °C)    Resp 21    Ht 6' (1.829 m)    Wt 77.5 kg (170 lb 14.4 oz)    SpO2 100%    BMI 23.18 kg/m2         Intake/Output Summary (Last 24 hours) at 05/12/18 1520  Last data filed at 05/12/18 1500   Gross per 24 hour   Intake             5645 ml   Output             1975 ml   Net             3670 ml       Physical Exam:     gen NAD  HENT mmm  RS AEBE clear  CVS s1 s2 wnl no JVD  GI soft BS +  Ext trace edema LE       Data Review:    Recent Labs      05/12/18   0330   WBC  12.1   RBC  3.38*   HCT  29.3*   MCV  86.7   MCH  27.8   MCHC  32.1   RDW  15.3*     Recent Labs      05/12/18   0330  05/11/18   1539  05/11/18   0350  05/10/18   2047  05/10/18   0810  05/10/18   0225  05/10/18   0006  05/09/18   1816   BUN  58*   --   79*   --    --   97*   --    --    CREA  1.42*   --   2.14*   --    --   2.86*   --    --    CA 7.6*   --   7.3*   --    --   7.6*   --    --    ALB  1.4*   --   1.3*   --    --   1.3*   --    --    K  3.3*   --   3.5   --    --   4.1   --    --    NA  150*  154*  154*  152*  157*  156*  157*  154*   CL  122*   --   126*   --    --   126*   --    --    CO2  20*   --   20*   --    --   20*   --    --    PHOS  2.1*   --   2.8   --    --   4.4   --    --    GLU  200*   --   204*   --    --   181*   --    --        Sukhdev Boston MD

## 2018-05-13 ENCOUNTER — APPOINTMENT (OUTPATIENT)
Dept: GENERAL RADIOLOGY | Age: 68
DRG: 871 | End: 2018-05-13
Attending: INTERNAL MEDICINE
Payer: MEDICARE

## 2018-05-13 LAB
ALBUMIN SERPL-MCNC: 1.5 G/DL (ref 3.4–5)
ANION GAP SERPL CALC-SCNC: 11 MMOL/L (ref 3–18)
BUN SERPL-MCNC: 46 MG/DL (ref 7–18)
BUN/CREAT SERPL: 39 (ref 12–20)
CALCIUM SERPL-MCNC: 7.6 MG/DL (ref 8.5–10.1)
CHLORIDE SERPL-SCNC: 118 MMOL/L (ref 100–108)
CO2 SERPL-SCNC: 22 MMOL/L (ref 21–32)
CREAT SERPL-MCNC: 1.17 MG/DL (ref 0.6–1.3)
GLUCOSE BLD STRIP.AUTO-MCNC: 154 MG/DL (ref 70–110)
GLUCOSE BLD STRIP.AUTO-MCNC: 172 MG/DL (ref 70–110)
GLUCOSE BLD STRIP.AUTO-MCNC: 197 MG/DL (ref 70–110)
GLUCOSE BLD STRIP.AUTO-MCNC: 212 MG/DL (ref 70–110)
GLUCOSE BLD STRIP.AUTO-MCNC: 223 MG/DL (ref 70–110)
GLUCOSE SERPL-MCNC: 210 MG/DL (ref 74–99)
PHOSPHATE SERPL-MCNC: 1.8 MG/DL (ref 2.5–4.9)
POTASSIUM SERPL-SCNC: 4 MMOL/L (ref 3.5–5.5)
SODIUM SERPL-SCNC: 151 MMOL/L (ref 136–145)
VANCOMYCIN SERPL-MCNC: 21.2 UG/ML (ref 5–40)

## 2018-05-13 PROCEDURE — 65270000029 HC RM PRIVATE

## 2018-05-13 PROCEDURE — 74011250637 HC RX REV CODE- 250/637: Performed by: INTERNAL MEDICINE

## 2018-05-13 PROCEDURE — 80202 ASSAY OF VANCOMYCIN: CPT | Performed by: INTERNAL MEDICINE

## 2018-05-13 PROCEDURE — 74011636637 HC RX REV CODE- 636/637: Performed by: FAMILY MEDICINE

## 2018-05-13 PROCEDURE — 74011250636 HC RX REV CODE- 250/636: Performed by: INTERNAL MEDICINE

## 2018-05-13 PROCEDURE — 82962 GLUCOSE BLOOD TEST: CPT

## 2018-05-13 PROCEDURE — 36415 COLL VENOUS BLD VENIPUNCTURE: CPT | Performed by: INTERNAL MEDICINE

## 2018-05-13 PROCEDURE — 80069 RENAL FUNCTION PANEL: CPT | Performed by: INTERNAL MEDICINE

## 2018-05-13 PROCEDURE — 74011000258 HC RX REV CODE- 258: Performed by: INTERNAL MEDICINE

## 2018-05-13 PROCEDURE — 74011250636 HC RX REV CODE- 250/636: Performed by: FAMILY MEDICINE

## 2018-05-13 PROCEDURE — 77030018798 HC PMP KT ENTRL FED COVD -A

## 2018-05-13 PROCEDURE — 77030018846 HC SOL IRR STRL H20 ICUM -A

## 2018-05-13 PROCEDURE — 71045 X-RAY EXAM CHEST 1 VIEW: CPT

## 2018-05-13 PROCEDURE — 74011636637 HC RX REV CODE- 636/637: Performed by: INTERNAL MEDICINE

## 2018-05-13 RX ORDER — INSULIN GLARGINE 100 [IU]/ML
5 INJECTION, SOLUTION SUBCUTANEOUS EVERY 12 HOURS
Status: DISCONTINUED | OUTPATIENT
Start: 2018-05-13 | End: 2018-05-14

## 2018-05-13 RX ORDER — FAMOTIDINE 20 MG/1
20 TABLET, FILM COATED ORAL 2 TIMES DAILY
Status: DISCONTINUED | OUTPATIENT
Start: 2018-05-13 | End: 2018-05-16 | Stop reason: HOSPADM

## 2018-05-13 RX ORDER — HYDRALAZINE HYDROCHLORIDE 20 MG/ML
20 INJECTION INTRAMUSCULAR; INTRAVENOUS
Status: DISCONTINUED | OUTPATIENT
Start: 2018-05-13 | End: 2018-05-16 | Stop reason: HOSPADM

## 2018-05-13 RX ORDER — HYDROCORTISONE SODIUM SUCCINATE 100 MG/2ML
50 INJECTION, POWDER, FOR SOLUTION INTRAMUSCULAR; INTRAVENOUS DAILY
Status: DISCONTINUED | OUTPATIENT
Start: 2018-05-14 | End: 2018-05-16 | Stop reason: HOSPADM

## 2018-05-13 RX ORDER — SODIUM CHLORIDE 9 MG/ML
75 INJECTION, SOLUTION INTRAVENOUS CONTINUOUS
Status: DISCONTINUED | OUTPATIENT
Start: 2018-05-13 | End: 2018-05-13

## 2018-05-13 RX ORDER — DEXTROSE MONOHYDRATE AND SODIUM CHLORIDE 5; .45 G/100ML; G/100ML
75 INJECTION, SOLUTION INTRAVENOUS CONTINUOUS
Status: DISCONTINUED | OUTPATIENT
Start: 2018-05-13 | End: 2018-05-14

## 2018-05-13 RX ORDER — MIDAZOLAM HYDROCHLORIDE 1 MG/ML
1 INJECTION, SOLUTION INTRAMUSCULAR; INTRAVENOUS
Status: DISCONTINUED | OUTPATIENT
Start: 2018-05-13 | End: 2018-05-16 | Stop reason: HOSPADM

## 2018-05-13 RX ADMIN — Medication 10 ML: at 05:47

## 2018-05-13 RX ADMIN — INSULIN LISPRO 4 UNITS: 100 INJECTION, SOLUTION INTRAVENOUS; SUBCUTANEOUS at 06:24

## 2018-05-13 RX ADMIN — Medication 10 ML: at 14:39

## 2018-05-13 RX ADMIN — FAMOTIDINE 20 MG: 20 TABLET ORAL at 09:49

## 2018-05-13 RX ADMIN — INSULIN LISPRO 2 UNITS: 100 INJECTION, SOLUTION INTRAVENOUS; SUBCUTANEOUS at 00:58

## 2018-05-13 RX ADMIN — ASPIRIN 325 MG ORAL TABLET 325 MG: 325 PILL ORAL at 09:49

## 2018-05-13 RX ADMIN — INSULIN LISPRO 2 UNITS: 100 INJECTION, SOLUTION INTRAVENOUS; SUBCUTANEOUS at 17:33

## 2018-05-13 RX ADMIN — INSULIN GLARGINE 5 UNITS: 100 INJECTION, SOLUTION SUBCUTANEOUS at 22:03

## 2018-05-13 RX ADMIN — SODIUM CHLORIDE 1000 MG: 900 INJECTION, SOLUTION INTRAVENOUS at 09:49

## 2018-05-13 RX ADMIN — INSULIN LISPRO 2 UNITS: 100 INJECTION, SOLUTION INTRAVENOUS; SUBCUTANEOUS at 14:37

## 2018-05-13 RX ADMIN — INSULIN LISPRO 4 UNITS: 100 INJECTION, SOLUTION INTRAVENOUS; SUBCUTANEOUS at 23:54

## 2018-05-13 RX ADMIN — FAMOTIDINE 20 MG: 20 TABLET ORAL at 17:34

## 2018-05-13 RX ADMIN — Medication 10 ML: at 22:10

## 2018-05-13 RX ADMIN — HYDROCORTISONE SODIUM SUCCINATE 50 MG: 100 INJECTION, POWDER, FOR SOLUTION INTRAMUSCULAR; INTRAVENOUS at 09:49

## 2018-05-13 RX ADMIN — DEXTROSE MONOHYDRATE AND SODIUM CHLORIDE 75 ML/HR: 5; .45 INJECTION, SOLUTION INTRAVENOUS at 14:39

## 2018-05-13 RX ADMIN — HEPARIN SODIUM 5000 UNITS: 5000 INJECTION, SOLUTION INTRAVENOUS; SUBCUTANEOUS at 14:38

## 2018-05-13 RX ADMIN — COLLAGENASE SANTYL: 250 OINTMENT TOPICAL at 09:50

## 2018-05-13 RX ADMIN — HEPARIN SODIUM 5000 UNITS: 5000 INJECTION, SOLUTION INTRAVENOUS; SUBCUTANEOUS at 22:17

## 2018-05-13 RX ADMIN — HEPARIN SODIUM 5000 UNITS: 5000 INJECTION, SOLUTION INTRAVENOUS; SUBCUTANEOUS at 05:46

## 2018-05-13 NOTE — PROGRESS NOTES
1300 Bedside and Verbal shift change report received from  [facility] . Report included the following information SBAR, Kardex, ED Summary, Procedure Summary, Intake/Output, MAR, Recent Results and Med Rec Status. Pt settled in 2100. No distress noted on the pt. Will continue to monitor the pt.  1650 Dr [facility] by bedside. Verbal order to increase free water flushes to 300 cc every 4 hours due to elevated sodium levels. 1930 Bedside and Verbal shift change report given to  Sr. Manny ELDER  (oncoming nurse) by Erma Valenzuela   (offgoing nurse). Report included the following information SBAR, Kardex, ED Summary, Intake/Output, MAR, Recent Results and Med Rec Status.

## 2018-05-13 NOTE — PROGRESS NOTES
Problem: Falls - Risk of  Goal: *Absence of Falls  Document Blanca Fall Risk and appropriate interventions in the flowsheet. Outcome: Progressing Towards Goal  Fall Risk Interventions:       Mentation Interventions: Bed/chair exit alarm, Adequate sleep, hydration, pain control    Medication Interventions: Bed/chair exit alarm    Elimination Interventions: Call light in reach, Toileting schedule/hourly rounds             Problem: Pressure Injury - Risk of  Goal: *Prevention of pressure injury  Document Rene Scale and appropriate interventions in the flowsheet. Outcome: Progressing Towards Goal  Pressure Injury Interventions:  Sensory Interventions: Check visual cues for pain    Moisture Interventions: Absorbent underpads    Activity Interventions: Pressure redistribution bed/mattress(bed type), Assess need for specialty bed    Mobility Interventions: Assess need for specialty bed, Float heels, Turn and reposition approx.  every two hours(pillow and wedges)    Nutrition Interventions: Document food/fluid/supplement intake    Friction and Shear Interventions: Feet elevated on foot rest, Apply protective barrier, creams and emollients, Foam dressings/transparent film/skin sealants, HOB 30 degrees or less, Transferring/repositioning devices

## 2018-05-13 NOTE — PROGRESS NOTES
Daily Progress Note: 5/13/2018 3:33 PM   Admit Date: 5/8/2018    Patient seen in follow up for multiple medical problems as listed below:  Patient Active Problem List   Diagnosis Code    Respiratory failure ([facility] J96.90    Encephalopathy G93.40    Dehydration E86.0    Lactic acidosis E87.2    Hypernatremia E87.0    SIMIN (acute kidney injury) ([facility] N17.9    CVA (cerebral vascular accident) ([facility] I63.9    [facility] A41.9    Elevated troponin R74.8    Postprocedural pneumothorax J95.811    Hyperglycemia R73.9    Elevated CK R74.8    Bacteremia R78.81       Garrett Carroll is a 76 y. o. male who presents via EMS unresponsive . Patient  is a resident at [address] [place] [address] with hx stroke with Left hemiparesis and G tube. 02 Sat 40's in the field and was intubated by EMS PTA. In the ED code S was called. Seen by tele neurology. CT head was suspect for acute/subacute infarct. Patient MRI brain does not show acute infarct . He is improving and able to follow some commands. Extubated 5/11. SIMIN present, slowly improving with IVF. Blood cultures showing Staph Hominis, ID consulting. Hypernatremia present, slowly improving with IVF. Out of ICU 5/13. Unclear what patients baseline is. RLE edema   - PVL x1     Encephalopathy   - metabolic, resolving   - MRI brain negative for acute infarct  - Neurology consulting     Respiratory Failure   - intubated PTA   - Intensivist consulted   - extubated 5/11, wean 02 as tolerated      Bacteremia   + GPC clusters->staph hominis  - Vanco   - ID following, TTE 5/9      Hypernatremia   - ->155->154 ->150  - IVF stopped 5/12, restarted 5/13      Lactic acidosis   - 2/2 hypovolemia , sepsis       SIMIN  - Prerenal . Cr 3.5 ->1.42 ->1. 1      Elevated troponin  - demand       Hx stroke   - MRI brain shows chronic infarction . No acute infarct      Pneumothorax  - chest tube out  - surgery following      Diarrhea   - check for C diff if continues.  Has FMS    DVT Protocol Active: yes  Code Status:  Full Code     Disposition: out of ICU today    Subjective:     CC: Unresponsive    Interval History: Mentation improving, pt able to talk but baseline unknown, will attempt to call POA. Ok out of ICU, Na back to 150 start IVF. Cr improving. Still need RLE PVL     ROS: 11 point ROS negative     Objective:     Visit Vitals    /69    Pulse 86    Temp 97.5 °F (36.4 °C)    Resp 14    Ht 6' (1.829 m)    Wt 79.4 kg (175 lb)    SpO2 97%    BMI 23.73 kg/m2       Temp (24hrs), Av.9 °F (36.6 °C), Min:97.5 °F (36.4 °C), Max:98.1 °F (36.7 °C)        Intake/Output Summary (Last 24 hours) at 18 1000  Last data filed at 18 0548   Gross per 24 hour   Intake             3700 ml   Output             1525 ml   Net             2175 ml       Gen: NAD + NC  HEENT:  CRISTIANO, EOMI. Neck: No Bruits/JVD   Lungs:   CTAB anteriorly  Heart:   RR S1 S2 without M/R/G  Abdomen: ND,NT, BSX4,   Extremities:   RLE swelling and erythema. No cyanosis.   Skin:  no jaundice/lesions      Data Review:     Meds/Labs/Tests reviewed    Current Shift:     Last three shifts:   1901 -  0700  In: 7480 [I.V.:705]  Out: 4965 [Urine:2355; Drains:600]  Recent Labs      18   0330  18   0350   WBC  12.1  11.3   RBC  3.38*  2.98*   HGB  9.4*  8.4*   HCT  29.3*  26.3*   PLT  117*  102*   GRANS   --   89*   LYMPH   --   7*   EOS   --   0       Recent Labs      18   0420  18   1550  18   0330  18   1539  18   0350  05/10/18   2047   BUN  46*   --   58*   --   79*   --    CREA  1.17   --   1.42*   --   2.14*   --    CA  7.6*   --   7.6*   --   7.3*   --    ALB  1.5*   --   1.4*   --   1.3*   --    K  4.0   --   3.3*   --   3.5   --    NA  151*  149*  150*  154*  154*  152*   CL  118*   --   122*   --   126*   --    CO2  22   --   20*   --   20*   --    PHOS  1.8*   --   2.1*   --   2.8   --    GLU  210*   --   200*   --   204*   --         Lab Results   Component Value Date/Time    Glucose 210 (H) 05/13/2018 04:20 AM    Glucose 200 (H) 05/12/2018 03:30 AM    Glucose 204 (H) 05/11/2018 03:50 AM    Glucose 181 (H) 05/10/2018 02:25 AM    Glucose 209 (H) 05/09/2018 03:55 AM          Care coordination with Nursing/Consultants/staff: 15  Prior history, labs, and charting reviewed: 20    Procedures/Imaging:  RK PARK 5/13-p    Total time spent with chart review, patient examination/education, discussion with staff on case,documentation and medication management / adjustment  :  39 Minutes      Dr Arnel Aguilar  [phone]

## 2018-05-13 NOTE — PROGRESS NOTES
In Patient Progress note      Admit Date: 5/8/2018    Impression:     1) nonoliguric SIMIN d/t ischemic atn in a setting of volume depletion/sepsis/resp failure      baseline creat not known , but has been improving , continue tube feeds and free water  2) Hypernatremia. improving free water with tube feeds, increase free water flushes to       300cc q4hrs  If full feelds , may d/c IVF ( noted switched to I/@ NS)   3) hypokalemia ,resolved, recheck in am  3) Mild met acidosis. Monitor at this time. 4) HTN. Continue amlodipine   5) Sepsis. Staph hominis , Ab per ID  /PCCM   6) Lt iatrogenic pneumothorax. improved  7) Resp failure. Improved   8) hypophos , replace , recheck in AM                  Discussed with patients nurse.                   Please call with questions,     Simin Shine MD FASN   Cell [phone]      Current Facility-Administered Medications:     vancomycin (VANCOCIN) 1,000 mg in 0.9% sodium chloride (MBP/ADV) 250 mL adv, 1,000 mg, IntraVENous, Q18H, Lexis Ortega MD, Last Rate: 125 mL/hr at 05/13/18 0949, 1,000 mg at 05/13/18 0949    [START ON 5/15/2018] VANCOMYCIN INFORMATION NOTE, , Other, ONCE, Lexis Ortega MD    famotidine (PEPCID) tablet 20 mg, 20 mg, Oral, BID, Riky Yeboah DO, 20 mg at 05/13/18 0949    insulin glargine (LANTUS) injection 5 Units, 5 Units, SubCUTAneous, Q12H, Riky Yeboah DO    hydrALAZINE (APRESOLINE) 20 mg/mL injection 20 mg, 20 mg, IntraVENous, Q6H PRN, Riky Yeboah DO    [START ON 5/14/2018] hydrocortisone Sod Succ (PF) (SOLU-CORTEF) injection 50 mg, 50 mg, IntraVENous, DAILY, Elder Lopez MD    dextrose 5 % - 0.45% NaCl infusion, 75 mL/hr, IntraVENous, CONTINUOUS, Riky Yeboah DO, Last Rate: 75 mL/hr at 05/13/18 1439, 75 mL/hr at 05/13/18 1439    midazolam (VERSED) injection 1 mg, 1 mg, IntraVENous, Q3H PRN, Riky Yeboah DO    aspirin (ASPIRIN) tablet 325 mg, 325 mg, Oral, DAILY, Elder Lopez MD, 325 mg at 05/13/18 0949    glucose chewable tablet 16 g, 4 Tab, Oral, PRN, Jett Braxton MD    glucagon (GLUCAGEN) injection 1 mg, 1 mg, IntraMUSCular, PRN, Jett Braxton MD    dextrose (D50W) injection syrg 12.5-25 g, 25-50 mL, IntraVENous, PRN, Jett Braxton MD, 12.5 g at 05/10/18 1316    collagenase (SANTYL) 250 unit/gram ointment, , Topical, DAILY, Laina Fairchild MD    insulin lispro (HUMALOG) injection, , SubCUTAneous, Q6H, Jett Braxton MD, 2 Units at 05/13/18 1437    sodium chloride (NS) flush 5-10 mL, 5-10 mL, IntraVENous, PRN, Slava Ricardo MD, 10 mL at 05/10/18 0511  [address] [facility] dosing per level, , Other, Rx Dosing/Monitoring, Slava Ricardo MD    sodium chloride (NS) flush 5-10 mL, 5-10 mL, IntraVENous, Q8H, Jett Braxton MD, 10 mL at 05/13/18 1439    sodium chloride (NS) flush 5-10 mL, 5-10 mL, IntraVENous, PRN, Jett Braxton MD, 10 mL at 05/09/18 2225    heparin (porcine) injection 5,000 Units, 5,000 Units, SubCUTAneous, Q8H, Jett Braxton MD, 5,000 Units at 05/13/18 1438    fentaNYL citrate (PF) injection 50 mcg, 50 mcg, IntraVENous, Q4H PRN, Jonel Perkins MD, 50 mcg at 05/09/18 1545        Objective:     Denies SOB, CP         Visit Vitals    /67    Pulse 86    Temp 97.9 °F (36.6 °C)    Resp 17    Ht 6' (1.829 m)    Wt 79.4 kg (175 lb)    SpO2 98%    BMI 23.73 kg/m2         Intake/Output Summary (Last 24 hours) at 05/13/18 1623  Last data filed at 05/13/18 1100   Gross per 24 hour   Intake             3200 ml   Output             1390 ml   Net             1810 ml       Physical Exam:     gen NAD  HENT mmm  RS AEBE clear  CVS s1 s2 wnl no JVD  GI soft BS +  Ext trace edema LE       Data Review:    Recent Labs      05/12/18   0330   WBC  12.1   RBC  3.38*   HCT  29.3*   MCV  86.7   MCH  27.8   MCHC  32.1   RDW  15.3*     Recent Labs      05/13/18   0420  05/12/18   1550 05/12/18   0330  05/11/18   1539  05/11/18   0350  05/10/18   2047   BUN  46*   --   58*   --   79*   --    CREA  1.17   --   1.42*   --   2.14*   --    CA  7.6*   --   7.6*   --   7.3*   --    ALB  1.5*   --   1.4*   --   1.3*   --    K  4.0   --   3.3*   --   3.5   --    NA  151*  149*  150*  154*  154*  152*   CL  118*   --   122*   --   126*   --    CO2  22   --   20*   --   20*   --    PHOS  1.8*   --   2.1*   --   2.8   --    GLU  210*   --   200*   --   204*   --        Isidro Xiao MD

## 2018-05-13 NOTE — PROGRESS NOTES
PCCM Progress Note                                              I have reviewed the flowsheet and previous days notes. Events, vitals, medications and notes from last 24 hours reviewed. Care plan discussed with staff . Subjective:  5/13/2018   Pt is doing better. He is answering questions even more today. Denies dyspnea or chest pain. Impression and Plan  S/p VDRF - Saturating well on RA  Hx of CVA. MRI brain on 5/9/18 shows subacute and chronic infarcts involving the right cerebral hemisphere, Small chronic infarct left frontal lobe. MRA brain shows No gross abnormality. Neurology note says stroke was not acute. Sepsis - Pt is afebrile today. Blood cx from 5/8/18 shows Staph Hominis. Likely source is skin wounds. ID is now on board. Blood cx from 5/11 shows no growth so far. Pt is on Vanco. Defer to ID. SIMIN - Improving. F/u with nephrology  Hypernatremia - Sodium is slowly coming down. Continue free water flushes  S/p Left small pneumothorax - CT removed by surgery. F/u repeat Cxr today. DVT and GI proph - Cont heparin and pepcid  Nutrition - Cont tube feeds, its at goal today  Hyperglycemia - ISS and lantus. Nutrition consulted  Chronic sawant - Urology was consulted    OTHER:  Glycemic Control. Glucose stabilizer per ICU protocol when on insulin drip. Maintain blood glucose 140-180. Replace electrolytes per ICU electrolyte replacement protocol  HOB >=30 degree elevation all the time. Aggressive pulmonary toileting. Incentive spirometry when appropriate. Aspiration precautions. Sepsis bundle and protocol followed. Deescalate antibiotic when appropriate. PT/OT eval and treat. OOB/IS when appropriate. Quality Care: Stress ulcer prophylaxis, DVT prophylaxis, HOB elevated, Infection control all reviewed and addressed. Events and notes from last 24 hours reviewed. Care plan discussed with nursing.      CC TIME: >35 min     Medication Reviewed:    No Known Allergies   No past medical history on file.   No past surgical history on file. Social History   Substance Use Topics    Smoking status: Not on file    Smokeless tobacco: Not on file    Alcohol use Not on file      No family history on file. Prior to Admission medications    Not on File     Current Facility-Administered Medications   Medication Dose Route Frequency    vancomycin (VANCOCIN) 1,000 mg in 0.9% sodium chloride (MBP/ADV) 250 mL adv  1,000 mg IntraVENous Q18H    [START ON 5/15/2018] VANCOMYCIN INFORMATION NOTE   Other ONCE    famotidine (PEPCID) tablet 20 mg  20 mg Oral BID    insulin glargine (LANTUS) injection 5 Units  5 Units SubCUTAneous Q12H    0.9% sodium chloride infusion  75 mL/hr IntraVENous CONTINUOUS    aspirin (ASPIRIN) tablet 325 mg  325 mg Oral DAILY    hydrocortisone Sod Succ (PF) (SOLU-CORTEF) injection 50 mg  50 mg IntraVENous Q12H    collagenase (SANTYL) 250 unit/gram ointment   Topical DAILY    insulin lispro (HUMALOG) injection   SubCUTAneous Q6H    Vancomyicn pharmacy dosing per level   Other Rx Dosing/Monitoring    sodium chloride (NS) flush 5-10 mL  5-10 mL IntraVENous Q8H    heparin (porcine) injection 5,000 Units  5,000 Units SubCUTAneous Q8H       Lines: All central lines examined by me. No signs of erythema, induration, discharge. Central Venous Catheter:  Triple Lumen 05/08/18 Left Internal jugular (Active)   Central Line Being Utilized Yes 5/9/2018  4:00 AM   Criteria for Appropriate Use Hemodynamically unstable, requiring monitoring lines, vasopressors, or volume resuscitation 5/9/2018  4:00 AM   Site Assessment Clean, dry, & intact 5/9/2018  4:00 AM   Infiltration Assessment 0 5/9/2018  4:00 AM   Affected Extremity/Extremities Color distal to insertion site pink (or appropriate for race); Pulses palpable 5/9/2018  4:00 AM   Date of Last Dressing Change 05/09/18 5/9/2018  4:00 AM   Dressing Status Clean, dry, & intact 5/9/2018  4:00 AM   Dressing Type Tape;Disk with Chlorhexadine gluconate (CHG); Transparent 2018  4:00 AM   Action Taken Open ports on tubing capped 2018  4:00 AM   Proximal Hub Color/Line Status Brown; Infusing 2018  4:00 AM   Positive Blood Return (Medial Site) Yes 2018  4:00 AM   Medial Hub Color/Line Status White; Infusing 2018  4:00 AM   Positive Blood Return (Lateral Site) Yes 2018  4:00 AM   Distal Hub Color/Line Status Blue;Capped 2018  4:00 AM   Positive Blood Return (Site #3) Yes 2018  4:00 AM   Alcohol Cap Used Yes 2018  4:00 AM     Peripheral Intravenous Line:  Peripheral IV 18 Left Antecubital (Active)   Site Assessment Clean, dry, & intact 2018  7:00 AM   Phlebitis Assessment 0 2018  7:00 AM   Infiltration Assessment 0 2018  7:00 AM   Dressing Status Clean, dry, & intact 2018  7:00 AM   Dressing Type Tape;Transparent 2018  4:00 AM   Hub Color/Line Status Green; Infusing 2018  4:00 AM   Action Taken Open ports on tubing capped 2018  4:00 AM   Alcohol Cap Used No 2018  4:00 AM   Drain(s):  PEG/Gastrostomy Tube 18 (Active)   Site Assessment Clean, dry, & intact 2018  4:00 AM   Dressing Status Clean, dry, & intact 2018  4:00 AM   G Port Status Clamped 2018  4:00 AM     Airway:  Airway - Endotracheal Tube 18 Oral (Active)   Insertion Depth (cm) 26 cm 2018  7:14 AM   Line Gee Lips 2018  7:14 AM   Side Secured Left 2018  7:14 AM   Cuff Pressure 28 cmH20 2018  7:14 AM   Site Assessment Clean, dry, & intact 2018  7:14 AM       Objective:  Vital Signs:    Visit Vitals    /69    Pulse 86    Temp 97.5 °F (36.4 °C)    Resp 14    Ht 6' (1.829 m)    Wt 79.4 kg (175 lb)    SpO2 97%    BMI 23.73 kg/m2      O2 Device: Room air  O2 Flow Rate (L/min): 2 l/min  Temp (24hrs), Av.9 °F (36.6 °C), Min:97.5 °F (36.4 °C), Max:98.1 °F (36.7 °C)      Intake/Output:   Last shift:       0701 -  1900  In: 800 [I.V.:250]  Out: 250 [Urine:250]    Last 3 shifts:  1901 - 05/13 0700  In: 7480 [I.V.:705]  Out: 4073 [Urine:2355; Drains:600]      Intake/Output Summary (Last 24 hours) at 05/13/18 1136  Last data filed at 05/13/18 1100   Gross per 24 hour   Intake             4325 ml   Output             1710 ml   Net             2615 ml       Last 3 Recorded Weights in this Encounter    05/10/18 2029 05/11/18 2058 05/12/18 2143   Weight: 74.8 kg (164 lb 12.8 oz) 77.5 kg (170 lb 14.4 oz) 79.4 kg (175 lb)     Physical Exam:     General/Neurology: Awake, answers questions  Head:   Normocephalic, without obvious abnormality  Eye:   PERRL, no scleral icterus, no pallor  Oral:   Mucus membranes moist  Neck:   Supple  Lung:   B/l air entry is decreased, min SQ air present around left CT site  Heart:   Regular rate & rhythm. S1 S2 present.    Abdomen/: Soft, non tender, BS +nt  Extremities:  Min pedal edema  Skin:  Ch stases changes present    Data:      Recent Results (from the past 24 hour(s))   SODIUM    Collection Time: 05/12/18  3:50 PM   Result Value Ref Range    Sodium 149 (H) 136 - 145 mmol/L   GLUCOSE, POC    Collection Time: 05/12/18  5:00 PM   Result Value Ref Range    Glucose (POC) 202 (H) 70 - 110 mg/dL   GLUCOSE, POC    Collection Time: 05/13/18 12:54 AM   Result Value Ref Range    Glucose (POC) 197 (H) 70 - 110 mg/dL   RENAL FUNCTION PANEL    Collection Time: 05/13/18  4:20 AM   Result Value Ref Range    Sodium 151 (H) 136 - 145 mmol/L    Potassium 4.0 3.5 - 5.5 mmol/L    Chloride 118 (H) 100 - 108 mmol/L    CO2 22 21 - 32 mmol/L    Anion gap 11 3.0 - 18 mmol/L    Glucose 210 (H) 74 - 99 mg/dL    BUN 46 (H) 7.0 - 18 MG/DL    Creatinine 1.17 0.6 - 1.3 MG/DL    BUN/Creatinine ratio 39 (H) 12 - 20      GFR est AA >60 >60 ml/min/1.73m2    GFR est non-AA >60 >60 ml/min/1.73m2    Calcium 7.6 (L) 8.5 - 10.1 MG/DL    Phosphorus 1.8 (L) 2.5 - 4.9 MG/DL    Albumin 1.5 (L) 3.4 - 5.0 g/dL   VANCOMYCIN, RANDOM    Collection Time: 05/13/18  4:20 AM   Result Value Ref Range Vancomycin, random 21.2 5.0 - 40.0 UG/ML   GLUCOSE, POC    Collection Time: 05/13/18  6:20 AM   Result Value Ref Range    Glucose (POC) 223 (H) 70 - 110 mg/dL         Chemistry   Recent Labs      05/13/18   0420  05/12/18   1550  05/12/18   0330   05/11/18   0350   GLU  210*   --   200*   --   204*   NA  151*  149*  150*   < >  154*   K  4.0   --   3.3*   --   3.5   CL  118*   --   122*   --   126*   CO2  22   --   20*   --   20*   BUN  46*   --   58*   --   79*   CREA  1.17   --   1.42*   --   2.14*   CA  7.6*   --   7.6*   --   7.3*   PHOS  1.8*   --   2.1*   --   2.8   AGAP  11   --   8   --   8   BUCR  39*   --   41*   --   37*   ALB  1.5*   --   1.4*   --   1.3*    < > = values in this interval not displayed. CBC w/Diff   Recent Labs      05/12/18   0330  05/11/18   0350   WBC  12.1  11.3   RBC  3.38*  2.98*   HGB  9.4*  8.4*   HCT  29.3*  26.3*   PLT  117*  102*   GRANS   --   89*   LYMPH   --   7*   EOS   --   0       ABG    Recent Labs      05/11/18   1104   PHI  7.371   PCO2I  34.1*   PO2I  97   HCO3I  19.7*   FIO2I  25       Micro     Recent Labs      05/11/18   1242  05/11/18   1240   CULT  NO GROWTH AFTER 22 HOURS  NO GROWTH AFTER 22 HOURS     Recent Labs      05/11/18   1242  05/11/18   1240   CULT  NO GROWTH AFTER 22 HOURS  NO GROWTH AFTER 22 HOURS       CT (Most Recent)      Results from [place] encounter on 05/08/18   CT HEAD WO CONT   Narrative EXAM: CT head    INDICATION: Unresponsive    COMPARISON: No prior comparison study. TECHNIQUE: Axial CT imaging of the head was performed without intravenous  contrast.  Additional coronal and sagittal reconstructions were performed. One  or more dose reduction techniques were used on this CT: automated exposure  control, adjustment of the mAs and/or kVp according to patient's size, and  iterative reconstruction techniques.  The specific techniques utilized on this CT  exam have been documented in the patient's electronic medical record.  _______________    FINDINGS:    VENTRICLES/EXTRA-AXIAL SPACES: The ventricles and sulci are normal in their size  and configuration. BRAIN PARENCHYMA: There is a large confluent area of abnormal parenchymal  hypodensity involving much of the right cerebral hemisphere involving both  cortex and white matter. There is involvement of right occipital lobe extending  into the parietal lobe and perhaps portion of the posterior temporal lobe. Some  of this area has suggestion of cortical volume loss, although not diffusely. There may be mild amount of adjacent sulcal effacement in the parietal region. More clearly delineated and cephalized changes are seen more anteriorly in the  right frontotemporal region with cortical atrophy and volume loss and underlying  hypodensity. Elongated hypodensity is seen involving the lateral aspect of the  right basal ganglia involving external capsule, portion of the lentiform  nucleus, and extending into the adjacent corona radiata suggesting chronic  infarct. Discrete small area of encephalomalacia seen in the contralateral posterior left  frontal cortex towards the vertex, such as axial image 26 suggesting small  chronic cortical infarct. Elongated hypodensity is seen involving the right  central diana suggesting axonal degeneration from chronic infarct. There is no evidence of acute intracranial hemorrhage, midline shift, or  herniation. ORBITS: The visualized orbits are unremarkable. PARANASAL SINUSES/MASTOIDS: Visualized paranasal sinuses are clear. Visualized  mastoid air cells are clear. OSSEOUS STRUCTURES: No fracture is seen. OTHER: None.      _______________         Impression IMPRESSION:    1. No acute intracranial hemorrhage, midline shift, or herniation. 2. Right frontotemporal and lateral basal ganglia encephalomalacic changes most  likely from chronic infarct.  Additional small chronic posterior left frontal  cortical infarct was the vertex. 3. Right parieto-occipital parenchymal hypodensity very suspicious for infarct,  although more age indeterminate, suspect portions acute/subacute in age,  although there may be small areas of more chronic infarct present. No prior  comparison study. Please note that noncontrast head CT may be normal in early  acute infarct. 4. Mild nonspecific white matter disease likely representing chronic small  vessel changes. CRITICAL RESULT:    These critical results on this CODE S patient were directly communicated to Dr. Ely Golden at 2:21 PM on 5/8/2018. Results understood and acknowledged. XR (Most Recent). CXR reviewed by me and compared with previous CXR     Results from Hospital Encounter encounter on 05/08/18   XR CHEST PORT   Narrative Chest, single view    Indication: Follow-up left apical pneumothorax, prior chest tube removal    Comparison: 5/11/2018    Findings:  Portable upright AP view of the chest was obtained. Tiny residual  left apical pneumothorax appears essentially unchanged. There is also unchanged  patchy opacity throughout right mid and lower lung zones. Remaining lung  parenchyma appears otherwise adequately aerated. Endotracheal tube is no longer  present. Unchanged left jugular central venous catheter. The cardiomediastinal  silhouette is within normal limits. The pulmonary vasculature is unremarkable. No pleural effusion. No acute osseous abnormality. Impression Impression:    1. Unchanged tiny residual left apical pneumothorax. Interval extubation. 2. Patchy atelectasis/infiltrate in the right lower lobe also appears unchanged.           High complexity decision making was performed during the evaluation of this patient at high risk for decompensation with multiple organ involvement     Above mentioned total time spent on reviewing the case/medical record/data/notes/EMR/patient examination/documentation/coordinating care with nurse/consultants, exclusive of procedures with complex decision making performed and > 50% time spent in face to face evaluation.     Naila Agosto MD  5/13/2018

## 2018-05-14 ENCOUNTER — APPOINTMENT (OUTPATIENT)
Dept: GENERAL RADIOLOGY | Age: 68
DRG: 871 | End: 2018-05-14
Attending: INTERNAL MEDICINE
Payer: MEDICARE

## 2018-05-14 LAB
ALBUMIN SERPL-MCNC: 1.6 G/DL (ref 3.4–5)
ANION GAP SERPL CALC-SCNC: 10 MMOL/L (ref 3–18)
BASOPHILS # BLD: 0 K/UL (ref 0–0.06)
BASOPHILS NFR BLD: 0 % (ref 0–2)
BUN SERPL-MCNC: 36 MG/DL (ref 7–18)
BUN/CREAT SERPL: 41 (ref 12–20)
CALCIUM SERPL-MCNC: 7.3 MG/DL (ref 8.5–10.1)
CHLORIDE SERPL-SCNC: 117 MMOL/L (ref 100–108)
CO2 SERPL-SCNC: 22 MMOL/L (ref 21–32)
CREAT SERPL-MCNC: 0.87 MG/DL (ref 0.6–1.3)
DIFFERENTIAL METHOD BLD: ABNORMAL
EOSINOPHIL # BLD: 0 K/UL (ref 0–0.4)
EOSINOPHIL NFR BLD: 0 % (ref 0–5)
ERYTHROCYTE [DISTWIDTH] IN BLOOD BY AUTOMATED COUNT: 15.4 % (ref 11.6–14.5)
GLUCOSE BLD STRIP.AUTO-MCNC: 140 MG/DL (ref 70–110)
GLUCOSE BLD STRIP.AUTO-MCNC: 155 MG/DL (ref 70–110)
GLUCOSE BLD STRIP.AUTO-MCNC: 156 MG/DL (ref 70–110)
GLUCOSE BLD STRIP.AUTO-MCNC: 193 MG/DL (ref 70–110)
GLUCOSE SERPL-MCNC: 168 MG/DL (ref 74–99)
HCT VFR BLD AUTO: 37.8 % (ref 36–48)
HGB BLD-MCNC: 12.5 G/DL (ref 13–16)
LYMPHOCYTES # BLD: 1.7 K/UL (ref 0.9–3.6)
LYMPHOCYTES NFR BLD: 16 % (ref 21–52)
MCH RBC QN AUTO: 28.2 PG (ref 24–34)
MCHC RBC AUTO-ENTMCNC: 33.1 G/DL (ref 31–37)
MCV RBC AUTO: 85.3 FL (ref 74–97)
MONOCYTES # BLD: 0.8 K/UL (ref 0.05–1.2)
MONOCYTES NFR BLD: 8 % (ref 3–10)
NEUTS SEG # BLD: 7.8 K/UL (ref 1.8–8)
NEUTS SEG NFR BLD: 76 % (ref 40–73)
PHOSPHATE SERPL-MCNC: 2.3 MG/DL (ref 2.5–4.9)
PLATELET # BLD AUTO: 124 K/UL (ref 135–420)
PMV BLD AUTO: 10.6 FL (ref 9.2–11.8)
POTASSIUM SERPL-SCNC: 3.6 MMOL/L (ref 3.5–5.5)
RBC # BLD AUTO: 4.43 M/UL (ref 4.7–5.5)
SODIUM SERPL-SCNC: 149 MMOL/L (ref 136–145)
WBC # BLD AUTO: 10.4 K/UL (ref 4.6–13.2)

## 2018-05-14 PROCEDURE — 77030037878 HC DRSG MEPILEX >48IN BORD MOLN -B

## 2018-05-14 PROCEDURE — 77030020186 HC BOOT HL PROTCT SAGE -B

## 2018-05-14 PROCEDURE — 74011000258 HC RX REV CODE- 258: Performed by: INTERNAL MEDICINE

## 2018-05-14 PROCEDURE — 74011250637 HC RX REV CODE- 250/637: Performed by: INTERNAL MEDICINE

## 2018-05-14 PROCEDURE — 74011250636 HC RX REV CODE- 250/636: Performed by: HOSPITALIST

## 2018-05-14 PROCEDURE — 71045 X-RAY EXAM CHEST 1 VIEW: CPT

## 2018-05-14 PROCEDURE — 74011636637 HC RX REV CODE- 636/637: Performed by: HOSPITALIST

## 2018-05-14 PROCEDURE — 80069 RENAL FUNCTION PANEL: CPT | Performed by: INTERNAL MEDICINE

## 2018-05-14 PROCEDURE — 85025 COMPLETE CBC W/AUTO DIFF WBC: CPT | Performed by: INTERNAL MEDICINE

## 2018-05-14 PROCEDURE — 74011636637 HC RX REV CODE- 636/637: Performed by: FAMILY MEDICINE

## 2018-05-14 PROCEDURE — 74011250636 HC RX REV CODE- 250/636: Performed by: INTERNAL MEDICINE

## 2018-05-14 PROCEDURE — 93971 EXTREMITY STUDY: CPT

## 2018-05-14 PROCEDURE — 77030020253 HC SOL INJ D545NS .05 DEX .45 SAL

## 2018-05-14 PROCEDURE — 36415 COLL VENOUS BLD VENIPUNCTURE: CPT | Performed by: INTERNAL MEDICINE

## 2018-05-14 PROCEDURE — 77030020291 HC FLEXSEAL FMS BMS -C

## 2018-05-14 PROCEDURE — 65270000029 HC RM PRIVATE

## 2018-05-14 PROCEDURE — 74011636637 HC RX REV CODE- 636/637: Performed by: INTERNAL MEDICINE

## 2018-05-14 PROCEDURE — 74011250636 HC RX REV CODE- 250/636: Performed by: FAMILY MEDICINE

## 2018-05-14 PROCEDURE — 82962 GLUCOSE BLOOD TEST: CPT

## 2018-05-14 PROCEDURE — 77030011256 HC DRSG MEPILEX <16IN NO BORD MOLN -A

## 2018-05-14 RX ORDER — ENOXAPARIN SODIUM 100 MG/ML
1 INJECTION SUBCUTANEOUS EVERY 12 HOURS
Status: DISCONTINUED | OUTPATIENT
Start: 2018-05-14 | End: 2018-05-16

## 2018-05-14 RX ORDER — AMLODIPINE BESYLATE 5 MG/1
5 TABLET ORAL DAILY
Status: DISCONTINUED | OUTPATIENT
Start: 2018-05-14 | End: 2018-05-16

## 2018-05-14 RX ORDER — INSULIN GLARGINE 100 [IU]/ML
6 INJECTION, SOLUTION SUBCUTANEOUS EVERY 12 HOURS
Status: DISCONTINUED | OUTPATIENT
Start: 2018-05-14 | End: 2018-05-16 | Stop reason: HOSPADM

## 2018-05-14 RX ORDER — INSULIN GLARGINE 100 [IU]/ML
7 INJECTION, SOLUTION SUBCUTANEOUS EVERY 12 HOURS
Status: DISCONTINUED | OUTPATIENT
Start: 2018-05-14 | End: 2018-05-14

## 2018-05-14 RX ADMIN — AMLODIPINE BESYLATE 5 MG: 5 TABLET ORAL at 09:56

## 2018-05-14 RX ADMIN — FAMOTIDINE 20 MG: 20 TABLET ORAL at 18:09

## 2018-05-14 RX ADMIN — DEXTROSE MONOHYDRATE AND SODIUM CHLORIDE 75 ML/HR: 5; .45 INJECTION, SOLUTION INTRAVENOUS at 02:56

## 2018-05-14 RX ADMIN — INSULIN GLARGINE 6 UNITS: 100 INJECTION, SOLUTION SUBCUTANEOUS at 21:00

## 2018-05-14 RX ADMIN — Medication 10 ML: at 22:40

## 2018-05-14 RX ADMIN — HEPARIN SODIUM 5000 UNITS: 5000 INJECTION, SOLUTION INTRAVENOUS; SUBCUTANEOUS at 11:46

## 2018-05-14 RX ADMIN — Medication 10 ML: at 18:10

## 2018-05-14 RX ADMIN — COLLAGENASE SANTYL: 250 OINTMENT TOPICAL at 11:46

## 2018-05-14 RX ADMIN — Medication 10 ML: at 05:57

## 2018-05-14 RX ADMIN — INSULIN LISPRO 2 UNITS: 100 INJECTION, SOLUTION INTRAVENOUS; SUBCUTANEOUS at 06:34

## 2018-05-14 RX ADMIN — INSULIN GLARGINE 5 UNITS: 100 INJECTION, SOLUTION SUBCUTANEOUS at 09:56

## 2018-05-14 RX ADMIN — HEPARIN SODIUM 5000 UNITS: 5000 INJECTION, SOLUTION INTRAVENOUS; SUBCUTANEOUS at 18:10

## 2018-05-14 RX ADMIN — SODIUM CHLORIDE 1000 MG: 900 INJECTION, SOLUTION INTRAVENOUS at 02:56

## 2018-05-14 RX ADMIN — ASPIRIN 325 MG ORAL TABLET 325 MG: 325 PILL ORAL at 09:56

## 2018-05-14 RX ADMIN — ENOXAPARIN SODIUM 80 MG: 80 INJECTION SUBCUTANEOUS at 22:36

## 2018-05-14 RX ADMIN — HYDROCORTISONE SODIUM SUCCINATE 50 MG: 100 INJECTION, POWDER, FOR SOLUTION INTRAMUSCULAR; INTRAVENOUS at 09:57

## 2018-05-14 RX ADMIN — HEPARIN SODIUM 5000 UNITS: 5000 INJECTION, SOLUTION INTRAVENOUS; SUBCUTANEOUS at 06:14

## 2018-05-14 RX ADMIN — FAMOTIDINE 20 MG: 20 TABLET ORAL at 09:56

## 2018-05-14 NOTE — ANCILLARY DISCHARGE INSTRUCTIONS
Patient and/or next of kin has been given the [facility] Important Message From Medicare About Your Rights\" letter and all questions were answered.

## 2018-05-14 NOTE — PROGRESS NOTES
Assumed care of patient from Lori Patton RN (offgoing nurse). Patient resting in bed, no signs of discomfort or distress noted. Dual skin assessment completed. Bed locked and in lowest position with call bell in reach. 200- Dr. Vidal Muse paged to inform of note from Vascular. Will await call back. 200- Dr. Vidal Muse returned page. Orders received for Lovenox 1mg/kg q12h. RBV.     1925- Bedside and Verbal shift change report given to Lori Patton RN   (oncoming nurse) by Maria A Gamboa (offgoing nurse). Report included the following information SBAR, Kardex, Intake/Output, MAR and Recent Results.

## 2018-05-14 NOTE — PROGRESS NOTES
Pt in bed resting alert and oriented xi to self,  denies pain at the moment. Assessement completed plan of care for the shift explained \ IVF infusing well, peg tube feeding infusing well  No residual. HOB elevated, bed low and in locked position. Call light and frequently used items within reach. Dual skin assessment completed. Pt has multiple skin breakdown as documented. Wound care in place. 0000- Tube feeding tube changed. Residual 0. Pt tolerating feed well, HOB elevated bed low and in locked position. 3719- Pt given a bed bath, FMS irrigated pt turned and repositioned made comfortable in bed.

## 2018-05-14 NOTE — PROGRESS NOTES
Problem: Falls - Risk of  Goal: *Absence of Falls  Document Blanca Fall Risk and appropriate interventions in the flowsheet. Outcome: Progressing Towards Goal  Fall Risk Interventions:     Mentation Interventions: Reorient patient, Bed/chair exit alarm, Door open when patient unattended, Update white board    Medication Interventions: Patient to call before getting OOB    Elimination Interventions: Bed/chair exit alarm, Call light in reach, Patient to call for help with toileting needs     Problem: Pressure Injury - Risk of  Goal: *Prevention of pressure injury  Document Rene Scale and appropriate interventions in the flowsheet. Outcome: Progressing Towards Goal  Pressure Injury Interventions:  Sensory Interventions: Check visual cues for pain, Keep linens dry and wrinkle-free, Monitor skin under medical devices, Pressure redistribution bed/mattress (bed type), Suspension boots, Turn and reposition approx. every two hours (pillows and wedges if needed)    Moisture Interventions: Absorbent underpads    Activity Interventions: Pressure redistribution bed/mattress(bed type)    Mobility Interventions: Assess need for specialty bed, HOB 30 degrees or less, Pressure redistribution bed/mattress (bed type), Suspension boots, Turn and reposition approx.  every two hours(pillow and wedges)    Nutrition Interventions: Document food/fluid/supplement intake    Friction and Shear Interventions: HOB 30 degrees or less, Foam dressings/transparent film/skin sealants

## 2018-05-14 NOTE — PROCEDURES
[facility]  *** FINAL REPORT ***    Name: Bruce Florence  MRN: [id number]    Inpatient  : 09 Mar 1950  HIS Order #: [id number]  [id number] [facility] Visit #: [id number]  Date: 14 May 2018    TYPE OF TEST: Peripheral Venous Testing    REASON FOR TEST  Pain in limb, Limb swelling    Right Leg:-  Deep venous thrombosis:           Yes  Proximal extent of thrombus:      Common Femoral  Superficial venous thrombosis:    Yes  Deep venous insufficiency:        Not examined  Superficial venous insufficiency: Not examined      INTERPRETATION/FINDINGS  Duplex images were obtained using 2-D gray scale, color flow, and  spectral Doppler analysis. Right leg :  1. Deep vein(s) visualized include the common femoral, deep femoral,  proximal femoral, mid femoral, distal femoral, popliteal(above knee),  popliteal(fossa), popliteal(below knee), posterior tibial and peroneal   veins. 2. Acute occlusive deep venous thrombosis identified in the common  femoral, proximal femoral, mid femoral, and distal femoral veins. 3. Subacute occlusive deep venous thrombosis identified in the  popliteal(above knee), popliteal(fossa), popliteal(below knee) and  posterior tibial veins. 4. No evidence of deep vein thrombosis in the contralateral common  femoral vein. 5. Superficial vein(s) visualized include the great saphenous and  small saphenous veins. 6. Superficial venous thrombosis identified in the sapheno-femoral  junction. ADDITIONAL COMMENTS  Patient's nurse, Dale Syed, was notified of positive findings. I have personally reviewed the data relevant to the interpretation of  this  study. TECHNOLOGIST: Tita Severin.  Fellowes, RTR, RVT  Signed: 2018 07:18 PM    PHYSICIAN: Gatito Zarate MD  Signed: 05/15/2018 04:17 PM

## 2018-05-14 NOTE — PROGRESS NOTES
PVL- Right lower extremity venous duplex exam completed. Extensive DVT of right leg. Acute occlusive DVT in the right common femoral, sapheno-femoral junction, proximal/ mid/ distal femoral vein. Subacute occlusive DVT in the right popliteal and posterior tibial veins. Unable to clearly identify the deep femoral or peroneal veins on the right due to patient's positioning. No evidence of DVT in the left contralateral common femoral vein. Patient's nurse, Franki Cuevas, was given a verbal report of positive findings. Reports to follow. Thank you.

## 2018-05-14 NOTE — PROGRESS NOTES
Infectious Disease Follow-up     Admit Date: 5/8/2018    Current abx Prior abx    None 5/14 - 0 Zosyn 5/8-3  Vanco 5/8-6      ASSESSMENT: -- RECOMMENDATIONS       Positive blood culture - 1 of 1 blcx on admission with Staph hominis  - drawn in ED and no sig associated fever, leukocytosis  - 2D echo 5/9: no valve vegetation  - rpt blcx 5/11 - NGTD x 2  - likely contaminant -> dc Vancomycin   Sacral decubitus with eschar  - unable to know exact depth but doesn't appear to be deep  - not infected-appearing - Continue with [address]  - LW per  care recommendations   Acute hypoxic resp failure- s/p intubation 5/8  - CXR with no definite pulm infiltrates/PNA  - [facility]   Doubtful Sepsis on admission  -  AMS, elevated LA, Cr and with severe hypoxia and hypotension on presentation requiring pressors  - could have been UTI but no ucx sent and only blcx 1 of 1 was +ve -> monitor   Encephalopathy- Improving - monitor   H/o CVA - CT brain raised concern for subacute/ chronic infarcts but MRI/MRA not s/o same - persistent left hemiparesis   SIMIN- unclear baseline  - with hypernatremia  - resolved - avoid nephrotoxic meds  - renal following   S/p Left small pneumothorax - suspect sec to central line placement  - s/p requirement of chest tube placement by Dr Sundar Coleman   - [address] removed by surgery on 5/10  - Pt with min Subcut. air around the chest tube site.   - monitor   Hyperglycemia     Chronic sawant cath- neg UA and no Ucx  - looks like sawant was changed on 5/8 - monitor        MICROBIOLOGY:   5/8 blcx x1 Staph hominis   5/11 blcx NGTD x 2     LINES/DRAINS:      Peg  Central line 5/8  Sawant cath     Active Hospital Problems    Diagnosis Date Noted    Bacteremia 05/10/2018    Sepsis ([address] 05/09/2018    Elevated troponin 05/09/2018    Postprocedural pneumothorax 05/09/2018    Hyperglycemia 05/09/2018    Elevated CK 05/09/2018    Respiratory failure ([address] 05/08/2018    Encephalopathy 05/08/2018    Dehydration 2018    Lactic acidosis 2018    Hypernatremia 2018    [facility] 2018    CVA (cerebral vascular accident) ([facility] 2018       Subjective: Interval notes reviewed. Lying in bed. Awake, responds appropriately to questions. Says he has pain in his \"right knee cap\". PVL being done in room - tech verbally reports DVT in right LE. Current Facility-Administered Medications   Medication Dose Route Frequency    amLODIPine (NORVASC) tablet 5 mg  5 mg Oral DAILY    insulin glargine (LANTUS) injection 6 Units  6 Units SubCUTAneous Q12H    famotidine (PEPCID) tablet 20 mg  20 mg Oral BID    hydrALAZINE (APRESOLINE) 20 mg/mL injection 20 mg  20 mg IntraVENous Q6H PRN    hydrocortisone Sod Succ (PF) (SOLU-CORTEF) injection 50 mg  50 mg IntraVENous DAILY    midazolam (VERSED) injection 1 mg  1 mg IntraVENous Q3H PRN    aspirin (ASPIRIN) tablet 325 mg  325 mg Oral DAILY    glucose chewable tablet 16 g  4 Tab Oral PRN    glucagon (GLUCAGEN) injection 1 mg  1 mg IntraMUSCular PRN    dextrose (D50W) injection syrg 12.5-25 g  25-50 mL IntraVENous PRN    collagenase (SANTYL) 250 unit/gram ointment   Topical DAILY    insulin lispro (HUMALOG) injection   SubCUTAneous Q6H    sodium chloride (NS) flush 5-10 mL  5-10 mL IntraVENous PRN    sodium chloride (NS) flush 5-10 mL  5-10 mL IntraVENous Q8H    sodium chloride (NS) flush 5-10 mL  5-10 mL IntraVENous PRN    heparin (porcine) injection 5,000 Units  5,000 Units SubCUTAneous Q8H    fentaNYL citrate (PF) injection 50 mcg  50 mcg IntraVENous Q4H PRN         Objective:     Visit Vitals    /90 (BP 1 Location: Right arm, BP Patient Position: At rest)    Pulse 92    Temp 98 °F (36.7 °C)    Resp 16    Ht 6' (1.829 m)    Wt 79.4 kg (175 lb)    SpO2 98%    BMI 23.73 kg/m2       Temp (24hrs), Av.1 °F (36.7 °C), Min:97.7 °F (36.5 °C), Max:98.4 °F (36.9 °C)     General: Well developed, 76 y.o. year-old, male   HEENT: Normocephalic, anicteric sclerae, Pupils equal, round reactive to light, no oropharyngeal lesions. No sinus tenderness. Neck: Supple, central line+, no lymphadenopathy, masses or thyromegaly  Chest: Symmetrical expansion, dec AE left side,  Lungs: Crackles+ bilaterally, no dullness  Heart: Regular rhythm, no murmurs, rubs or gallops, No JVD  Abdomen: Soft, non-tender,non distended, no organomegaly, BS+, PEG+  Musculoskeletal: 2+ edema. R>L LE   CNS: Awake,alert on vent so limited exam, no NR, able to move rt side on command  SKIN: SAcral ulcer- not examined today    Labs: Results:   Chemistry Recent Labs      05/14/18   0745  05/13/18   0420  05/12/18   1550  05/12/18   0330   GLU  168*  210*   --   200*   NA  149*  151*  149*  150*   K  3.6  4.0   --   3.3*   CL  117*  118*   --   122*   CO2  22  22   --   20*   BUN  36*  46*   --   58*   CREA  0.87  1.17   --   1.42*   CA  7.3*  7.6*   --   7.6*   AGAP  10  11   --   8   BUCR  41*  39*   --   41*   ALB  1.6*  1.5*   --   1.4*      CBC w/Diff Recent Labs      05/14/18   0745  05/12/18   0330   WBC  10.4  12.1   RBC  4.43*  3.38*   HGB  12.5*  9.4*   HCT  37.8  29.3*   PLT  124*  117*   GRANS  76*   --    LYMPH  16*   --    EOS  0   --             Microbiology Results  No results for input(s): SDES, CULT in the last 72 hours.     Ashley Sherman MD  May 14, 2018  [facility]  [phone]

## 2018-05-14 NOTE — PROGRESS NOTES
RENAL PROGRESS NOTE        Sivakumar Bridges         Assessment/Plan:     · SIMIN (ischemic atn in a setting of volume depletion/sepsis/resp failure). Resolving. · Hypernatremia. Improving slowly. Continue free water per peg. D/c ivf. · Mild met acidosis. Resolved. · HTN. Add amlodipine. · Sepsis. Source? Pulm? On abx per ID. · Lt iatrogenic pneumothorax. CT pulled. Subjective:  Patient complaints off: Alert, ox2. Follows simple commands with rt hand. No cp/sob/n/v. Tolerates tf.          Patient Active Problem List   Diagnosis Code    Respiratory failure ([facility] J96.90    Encephalopathy G93.40    Dehydration E86.0    Lactic acidosis E87.2    Hypernatremia E87.0    SIMIN (acute kidney injury) ([facility] N17.9    CVA (cerebral vascular accident) ([facility] I63.9    Sepsis ([facility] A41.9    Elevated troponin R74.8    Postprocedural pneumothorax J95.811    Hyperglycemia R73.9    Elevated CK R74.8    Bacteremia R78.81       Current Facility-Administered Medications   Medication Dose Route Frequency Provider Last Rate Last Dose    vancomycin (VANCOCIN) 1,000 mg in 0.9% sodium chloride (MBP/ADV) 250 mL adv  1,000 mg IntraVENous Q18H Stephanie Mueller  mL/hr at 05/14/18 0256 1,000 mg at 05/14/18 0256    [START ON 5/15/2018] VANCOMYCIN INFORMATION NOTE   Other ONCE Stephanie Mueller MD        famotidine (PEPCID) tablet 20 mg  20 mg Oral BID Angelita Almeida DO   20 mg at 05/13/18 1734    insulin glargine (LANTUS) injection 5 Units  5 Units SubCUTAneous Q12H Angelita Almeida DO   5 Units at 05/13/18 2203    hydrALAZINE (APRESOLINE) 20 mg/mL injection 20 mg  20 mg IntraVENous Q6H PRN Angelita Almeida DO        hydrocortisone Sod Succ (PF) (SOLU-CORTEF) injection 50 mg  50 mg IntraVENous DAILY Santiago Garay MD        dextrose 5 % - 0.45% NaCl infusion  75 mL/hr IntraVENous CONTINUOUS Beth Held, DO 75 mL/hr at 05/14/18 0256 75 mL/hr at 05/14/18 0256    midazolam (VERSED) injection 1 mg  1 mg IntraVENous Q3H PRN Beth Held, DO        aspirin (ASPIRIN) tablet 325 mg  325 mg Oral DAILY Dale Bermudez MD   325 mg at 05/13/18 0949    glucose chewable tablet 16 g  4 Tab Oral PRN Amy Garvin MD        glucagon (GLUCAGEN) injection 1 mg  1 mg IntraMUSCular PRN Amy Garvin MD        dextrose (D50W) injection syrg 12.5-25 g  25-50 mL IntraVENous PRN Laina Pan MD   12.5 g at 05/10/18 1316    collagenase (SANTYL) 250 unit/gram ointment   Topical DAILY Laina Pan MD        insulin lispro (HUMALOG) injection   SubCUTAneous Q6H Amy Garvin MD   2 Units at 05/14/18 4549    sodium chloride (NS) flush 5-10 mL  5-10 mL IntraVENous PRN Kerry Cates MD   10 mL at 05/10/18 0511   [address] dosing per level   Other Rx Dosing/Monitoring Kerry Cates MD        sodium chloride (NS) flush 5-10 mL  5-10 mL IntraVENous Q8H Laina Pan MD   10 mL at 05/14/18 0557    sodium chloride (NS) flush 5-10 mL  5-10 mL IntraVENous PRN Amy Garvin MD   10 mL at 05/09/18 2225    heparin (porcine) injection 5,000 Units  5,000 Units SubCUTAneous Q8H Amy Garvin MD   5,000 Units at 05/14/18 0614    fentaNYL citrate (PF) injection 50 mcg  50 mcg IntraVENous Q4H PRN Dale Bermudez MD   50 mcg at 05/09/18 1545       Objective  Vitals:    05/13/18 1200 05/13/18 1300 05/13/18 2145 05/14/18 0619   BP: 135/67  (!) 166/93 (!) 166/99   Pulse: 86 88 (!) 102 80   Resp:   18 16   Temp: 97.9 °F (36.6 °C) 98 °F (36.7 °C) 97.7 °F (36.5 °C) 98.4 °F (36.9 °C)   SpO2: 98% 98% 98% 98%   Weight:       Height:             Intake/Output Summary (Last 24 hours) at 05/14/18 0929  Last data filed at 05/14/18 0851   Gross per 24 hour   Intake          1806.25 ml   Output 2850 ml   Net         -1043.75 ml           Admission weight: Weight: 72.6 kg (160 lb) (05/08/18 1420)  Last Weight Metrics:  Weight Loss Metrics 5/12/2018 5/8/2018   Today's Wt 175 lb -   BMI - 23.73 kg/m2             Physical Assessment:     General: NAD, alert. Dry oral mucosa. Neck: No jvd. LUNGS: good air entry, bl exp rhonchi. CVS EXM: S1, S2  RRR. Abdomen: soft, non tender. Peg in place, exit site is clear. Lower Extremities:  trace edema. Lab    CBC w/Diff Recent Labs      05/14/18   0745  05/12/18   0330   WBC  10.4  12.1   RBC  4.43*  3.38*   HGB  12.5*  9.4*   HCT  37.8  29.3*   PLT  124*  117*   GRANS  76*   --    LYMPH  16*   --    EOS  0   --         Chemistry Recent Labs      05/14/18   0745  05/13/18   0420  05/12/18   1550  05/12/18   0330   GLU  168*  210*   --   200*   NA  149*  151*  149*  150*   K  3.6  4.0   --   3.3*   CL  117*  118*   --   122*   CO2  22  22   --   20*   BUN  36*  46*   --   58*   CREA  0.87  1.17   --   1.42*   CA  7.3*  7.6*   --   7.6*   AGAP  10  11   --   8   BUCR  41*  39*   --   41*   ALB  1.6*  1.5*   --   1.4*   PHOS  2.3*  1.8*   --   2.1*         No results found for: IRON, FE, TIBC, IBCT, PSAT, FERR   Lab Results   Component Value Date/Time    Calcium 7.3 (L) 05/14/2018 07:45 AM    Phosphorus 2.3 (L) 05/14/2018 07:45 AM        Saul Marinelli M.D.   [facility]  Phone

## 2018-05-14 NOTE — PROGRESS NOTES
Pulmonary progress note  History 45-year-old male admitted to the hospital on May 8, 2018 secondary to being found unconscious at his nursing home. Patient was found to be severely hypoxic. He has a history of stroke with left hemiparesis status post G-tube. Labs showed hypernatremia and acute renal failure secondary to dehydration. Neurology evaluation found [NO] evidence of a recent right MCA territory stroke. Some of his injury may be related to dehydration and renal impairment. Nephrology consultation led to rehydration. Sodium of 158 on admission has now improved to 149. Creatinine has improved from 3.56 to 0.87. Albumin dropped to as low as 1.3. Blood cultures have grown staph hominis. This is an uncertain pathogen. He has been treated with vancomycin since admission. Central line placement was complicated by small left pneumothorax. Chest tube was placed and has subsequently been removed. Right lower lobe infiltrate versus atelectasis is improving. Left pneumothorax has resolved. He was extubated May 11. Today the patient answers questions but is hard to understand. He appears to deny pain, and express frustration at his NH care. Blood pressure (!) 138/94, pulse 93, temperature 98.2 °F (36.8 °C), resp. rate 18, height 6' (1.829 m), weight 79.4 kg (175 lb), SpO2 99 %. CTA  RRR  Soft  No left arm movement  Right leg and arm move  Pedal but no leg edema    Labs: WBC is 10.4 with no bands. Hemoglobin is 12.5. Platelets are 996. Sodium 149. BUN 36. creatinine 0.87. Albumin 1.6    Portable chest x-ray from today by my interpretation: There is resolution of the left pneumothorax. Improving right lower lobe infiltrate versus atelectasis small left pleural effusion    Assessment  Unresponsive, resolved  Dehydration with hypernatremia and acute renal failure resolving  Iatrogenic left pneumothorax, resolved  Hypoalbuminemia  Questionable staph hominis bacteremia on vancomycin. Subsequent blood cultures after initial set have been negative. No evidence of acute stroke by neurology evaluation. Metabolic factors most likely cause of possible stroke symptoms on admission    Plan  Continue rehydration  Follow chest x-ray  Duration of vancomycin therapy per infectious disease. This is my first patient visit and summarization of his hospital course to date.

## 2018-05-14 NOTE — PROGRESS NOTES
[facility]  Hospitalist Division        Inpatient Daily Progress Note    Daily progress Note    Patient: Dontrell Bentley MRN: [id number]  CSN: [id number]    YOB: 1950  Age: 76 y.o. Sex: male    DOA: 5/8/2018 LOS:  LOS: 6 days                    Chief Complaint:  Encephalopathy    Interval History: 76 y.o.  male who presented to the ED on 5/8/2018 via EMS unresponsive. Patient  is a resident at [facility] with hx stroke with Left hemiparesis and PEG tube. Oxygen Sats 40's in the field and was intubated by EMS PTA. In the ED code S was called. Seen by tele neurology. CT head was suspect for acute/subacute infarct, however MRI brain does not show acute infarct. Chest tube for small left pneumothorax, surgery removed chest tube on 5/10/2018. He began to improve and able to follow some commands. Pt was extubated on 5/11/2018. SIMIN present, slowly improving with IVF- nephrology following. Blood cultures showing Staph Hominis, ID consulted on 1/15/8407- per note, complicated situation as unclear significance of bacteremia as only one blood culture set drawn, could also be skin justin, source could be sacral decubitus or just a contaminant. Echo without any valve vegetation. Pt was already on IV Vancomycin. Repeat blood cultures obtained 5/11/2018 NGTD x 3 days, IV Vancomycin dc'd on 5/14/2018 as recommended per ID note. Hypernatremia present, slowly improving with IVF and free water flushes. Pt was transferred to the floor on 5/13/2018. Unclear of patient's baseline mental status.       Assessment/Plan:     Patient Active Problem List   Diagnosis Code    Respiratory failure ([facility] J96.90    Encephalopathy G93.40    Dehydration E86.0    Lactic acidosis E87.2    Hypernatremia E87.0    SIMIN (acute kidney injury) ([address] N17.9    CVA (cerebral vascular accident) ([address] I63.9    Sepsis ([address] A41.9    Elevated troponin R74.8    Postprocedural pneumothorax J95.811    Hyperglycemia R73.9    Elevated CK R74.8    Bacteremia R78.81       A/P:  RLE edema   - PVL x1 pending      Encephalopathy   - metabolic, resolving   - MRI brain negative for acute infarct  - Neurology following      Respiratory Failure   - intubated PTA   - Intensivist consulted   - extubated 5/11, wean 02 as tolerated      Bacteremia   - blood culture x 1 set collected 5/8/2018 with staph hominis  - ID following, appreciate input  - Echo 5/9/2018 negative for valve vegetation  - repeat blood cultures collected 5/11/2018 NGTD x 3 days  - per ID note, could be contaminant or   - IV Vancomycin dc'd today 5/14      Hypernatremia   - improving, continue to monitor BMP  - free water flushes via PEG  - nephrology following, appreciate input      Lactic acidosis   - resolved      SIMIN  - nephrology following, appreciate input  - improving, continue to monitor      Elevated troponin  - demand       Hx stroke   - MRI brain shows chronic infarction . No acute infarct  -  mg daily      Pneumothorax  - small left pneumothorax, chest tube removed on 5/10/2018  - repeat CXR 5/14/2018 subcutaneous emphysema along left chest wall, no pneumothorax identified      Diabetes  - accuchecks, correctional SSI, Lantus 6 units BID    DVT Prophylaxis  - Heparin subcutaneously TID      Byers , ROBBIE-RICHARDSON [address]  Pager:  [phone]  Office:  [phone]        Subjective:      No events overnight. Interval notes reviewed.     Objective:      Visit Vitals    BP (!) 166/99 (BP 1 Location: Right arm, BP Patient Position: At rest;Supine)    Pulse 80    Temp 98.4 °F (36.9 °C)    Resp 16    Ht 6' (1.829 m)    Wt 79.4 kg (175 lb)    SpO2 98%    BMI 23.73 kg/m2           Physical Exam:  General appearance: no acute distress noted  Head: Normocephalic, without obvious abnormality, atraumatic  Lungs: clear to auscultation bilaterally  Heart: regular rate and rhythm, S1, S2 normal, no murmur, click, rub or gallop  Abdomen: soft, non tender, non distended. Normoactive bowel sounds. Extremities: 2+ edema BLE, no cyanosis, 1+ edema LUE  Skin: Skin color, texture, turgor normal. No rashes or lesions  Neurologic: A/O to self, follows simple commands, contracted LUE      Intake and Output:  Current Shift:     Last three shifts:  05/12 1901 - 05/14 0700  In: 3076.3 [I.V.:351.3]  Out: 1405 [Urine:1305; Drains:100]    Recent Results (from the past 24 hour(s))   GLUCOSE, POC    Collection Time: 05/13/18  2:00 PM   Result Value Ref Range    Glucose (POC) 172 (H) 70 - 110 mg/dL   GLUCOSE, POC    Collection Time: 05/13/18  4:09 PM   Result Value Ref Range    Glucose (POC) 154 (H) 70 - 110 mg/dL   GLUCOSE, POC    Collection Time: 05/13/18 11:15 PM   Result Value Ref Range    Glucose (POC) 212 (H) 70 - 110 mg/dL   GLUCOSE, POC    Collection Time: 05/14/18  6:13 AM   Result Value Ref Range    Glucose (POC) 193 (H) 70 - 110 mg/dL           Lab Results   Component Value Date/Time    Glucose 210 (H) 05/13/2018 04:20 AM    Glucose 200 (H) 05/12/2018 03:30 AM    Glucose 204 (H) 05/11/2018 03:50 AM    Glucose 181 (H) 05/10/2018 02:25 AM    Glucose 209 (H) 05/09/2018 03:55 AM        Imaging:  No results found.     Medication List Reviewed:  Current Facility-Administered Medications   Medication Dose Route Frequency    vancomycin (VANCOCIN) 1,000 mg in 0.9% sodium chloride (MBP/ADV) 250 mL adv  1,000 mg IntraVENous Q18H    [START ON 5/15/2018] VANCOMYCIN INFORMATION NOTE   Other ONCE    famotidine (PEPCID) tablet 20 mg  20 mg Oral BID    insulin glargine (LANTUS) injection 5 Units  5 Units SubCUTAneous Q12H    hydrALAZINE (APRESOLINE) 20 mg/mL injection 20 mg  20 mg IntraVENous Q6H PRN    hydrocortisone Sod Succ (PF) (SOLU-CORTEF) injection 50 mg  50 mg IntraVENous DAILY    dextrose 5 % - 0.45% NaCl infusion  75 mL/hr IntraVENous CONTINUOUS    midazolam (VERSED) injection 1 mg  1 mg IntraVENous Q3H PRN    aspirin (ASPIRIN) tablet 325 mg  325 mg Oral DAILY    glucose chewable tablet 16 g  4 Tab Oral PRN    glucagon (GLUCAGEN) injection 1 mg  1 mg IntraMUSCular PRN    dextrose (D50W) injection syrg 12.5-25 g  25-50 mL IntraVENous PRN    collagenase (SANTYL) 250 unit/gram ointment   Topical DAILY    insulin lispro (HUMALOG) injection   SubCUTAneous Q6H    sodium chloride (NS) flush 5-10 mL  5-10 mL IntraVENous PRN    Vancomyicn pharmacy dosing per level   Other Rx Dosing/Monitoring    sodium chloride (NS) flush 5-10 mL  5-10 mL IntraVENous Q8H    sodium chloride (NS) flush 5-10 mL  5-10 mL IntraVENous PRN    heparin (porcine) injection 5,000 Units  5,000 Units SubCUTAneous Q8H    fentaNYL citrate (PF) injection 50 mcg  50 mcg IntraVENous Q4H PRN

## 2018-05-14 NOTE — PROGRESS NOTES
attempted to complete a  follow up visit with patient in room 2115 and a Spiritual assessment of patient was done Patient says that he is trying to cope with this news and the new life plans from here on out. . .  Chaplains will continue to follow and will provide pastoral care on an as needed/requested basis    Chaplain Matthew Abernathy   Board Certified [address] [place] [address]   [phone]

## 2018-05-14 NOTE — ROUTINE PROCESS
Bedside and Verbal shift change report given to JAEL shah (oncoming nurse) by Jocelyn Cazares RN (offgoing nurse). Report included the following information SBAR, Kardex, Intake/Output, MAR and Recent Results. Dual skin assesment done with the oncoming nurse.

## 2018-05-15 LAB
ALBUMIN SERPL-MCNC: 1.5 G/DL (ref 3.4–5)
ANION GAP SERPL CALC-SCNC: 6 MMOL/L (ref 3–18)
BASOPHILS # BLD: 0 K/UL (ref 0–0.06)
BASOPHILS NFR BLD: 0 % (ref 0–2)
BUN SERPL-MCNC: 30 MG/DL (ref 7–18)
BUN/CREAT SERPL: 36 (ref 12–20)
CALCIUM SERPL-MCNC: 7.8 MG/DL (ref 8.5–10.1)
CHLORIDE SERPL-SCNC: 116 MMOL/L (ref 100–108)
CO2 SERPL-SCNC: 28 MMOL/L (ref 21–32)
CREAT SERPL-MCNC: 0.84 MG/DL (ref 0.6–1.3)
DIFFERENTIAL METHOD BLD: ABNORMAL
EOSINOPHIL # BLD: 0.4 K/UL (ref 0–0.4)
EOSINOPHIL NFR BLD: 3 % (ref 0–5)
ERYTHROCYTE [DISTWIDTH] IN BLOOD BY AUTOMATED COUNT: 15.8 % (ref 11.6–14.5)
GLUCOSE BLD STRIP.AUTO-MCNC: 101 MG/DL (ref 70–110)
GLUCOSE BLD STRIP.AUTO-MCNC: 137 MG/DL (ref 70–110)
GLUCOSE BLD STRIP.AUTO-MCNC: 179 MG/DL (ref 70–110)
GLUCOSE SERPL-MCNC: 98 MG/DL (ref 74–99)
HCT VFR BLD AUTO: 34.8 % (ref 36–48)
HGB BLD-MCNC: 11.4 G/DL (ref 13–16)
LYMPHOCYTES # BLD: 1.9 K/UL (ref 0.9–3.6)
LYMPHOCYTES NFR BLD: 17 % (ref 21–52)
MAGNESIUM SERPL-MCNC: 2.2 MG/DL (ref 1.6–2.6)
MCH RBC QN AUTO: 27.7 PG (ref 24–34)
MCHC RBC AUTO-ENTMCNC: 32.8 G/DL (ref 31–37)
MCV RBC AUTO: 84.7 FL (ref 74–97)
MONOCYTES # BLD: 1 K/UL (ref 0.05–1.2)
MONOCYTES NFR BLD: 9 % (ref 3–10)
NEUTS SEG # BLD: 8 K/UL (ref 1.8–8)
NEUTS SEG NFR BLD: 71 % (ref 40–73)
PHOSPHATE SERPL-MCNC: 1.9 MG/DL (ref 2.5–4.9)
PLATELET # BLD AUTO: 134 K/UL (ref 135–420)
PMV BLD AUTO: 10.4 FL (ref 9.2–11.8)
POTASSIUM SERPL-SCNC: 3.6 MMOL/L (ref 3.5–5.5)
RBC # BLD AUTO: 4.11 M/UL (ref 4.7–5.5)
SODIUM SERPL-SCNC: 150 MMOL/L (ref 136–145)
WBC # BLD AUTO: 11.2 K/UL (ref 4.6–13.2)

## 2018-05-15 PROCEDURE — 74011250637 HC RX REV CODE- 250/637: Performed by: FAMILY MEDICINE

## 2018-05-15 PROCEDURE — 83735 ASSAY OF MAGNESIUM: CPT | Performed by: NURSE PRACTITIONER

## 2018-05-15 PROCEDURE — 77030018798 HC PMP KT ENTRL FED COVD -A

## 2018-05-15 PROCEDURE — 74011636637 HC RX REV CODE- 636/637: Performed by: FAMILY MEDICINE

## 2018-05-15 PROCEDURE — 77030037878 HC DRSG MEPILEX >48IN BORD MOLN -B

## 2018-05-15 PROCEDURE — 74011250637 HC RX REV CODE- 250/637: Performed by: INTERNAL MEDICINE

## 2018-05-15 PROCEDURE — 82962 GLUCOSE BLOOD TEST: CPT

## 2018-05-15 PROCEDURE — 65270000029 HC RM PRIVATE

## 2018-05-15 PROCEDURE — 36415 COLL VENOUS BLD VENIPUNCTURE: CPT | Performed by: INTERNAL MEDICINE

## 2018-05-15 PROCEDURE — 80069 RENAL FUNCTION PANEL: CPT | Performed by: INTERNAL MEDICINE

## 2018-05-15 PROCEDURE — 74011250636 HC RX REV CODE- 250/636: Performed by: HOSPITALIST

## 2018-05-15 PROCEDURE — 85025 COMPLETE CBC W/AUTO DIFF WBC: CPT | Performed by: INTERNAL MEDICINE

## 2018-05-15 PROCEDURE — 76450000000

## 2018-05-15 PROCEDURE — 77030011256 HC DRSG MEPILEX <16IN NO BORD MOLN -A

## 2018-05-15 PROCEDURE — 74011250636 HC RX REV CODE- 250/636: Performed by: INTERNAL MEDICINE

## 2018-05-15 PROCEDURE — 74011636637 HC RX REV CODE- 636/637: Performed by: HOSPITALIST

## 2018-05-15 RX ORDER — ACETAMINOPHEN 325 MG/1
650 TABLET ORAL
Status: DISCONTINUED | OUTPATIENT
Start: 2018-05-15 | End: 2018-05-16 | Stop reason: HOSPADM

## 2018-05-15 RX ADMIN — ASPIRIN 325 MG ORAL TABLET 325 MG: 325 PILL ORAL at 09:04

## 2018-05-15 RX ADMIN — HYDROCORTISONE SODIUM SUCCINATE 50 MG: 100 INJECTION, POWDER, FOR SOLUTION INTRAMUSCULAR; INTRAVENOUS at 09:03

## 2018-05-15 RX ADMIN — ACETAMINOPHEN 650 MG: 325 TABLET ORAL at 18:05

## 2018-05-15 RX ADMIN — AMLODIPINE BESYLATE 5 MG: 5 TABLET ORAL at 09:08

## 2018-05-15 RX ADMIN — COLLAGENASE SANTYL: 250 OINTMENT TOPICAL at 09:05

## 2018-05-15 RX ADMIN — ENOXAPARIN SODIUM 80 MG: 80 INJECTION SUBCUTANEOUS at 09:04

## 2018-05-15 RX ADMIN — Medication 10 ML: at 09:08

## 2018-05-15 RX ADMIN — Medication 10 ML: at 14:29

## 2018-05-15 RX ADMIN — INSULIN LISPRO 2 UNITS: 100 INJECTION, SOLUTION INTRAVENOUS; SUBCUTANEOUS at 12:12

## 2018-05-15 RX ADMIN — INSULIN LISPRO 2 UNITS: 100 INJECTION, SOLUTION INTRAVENOUS; SUBCUTANEOUS at 00:53

## 2018-05-15 RX ADMIN — FAMOTIDINE 20 MG: 20 TABLET ORAL at 18:05

## 2018-05-15 RX ADMIN — INSULIN GLARGINE 6 UNITS: 100 INJECTION, SOLUTION SUBCUTANEOUS at 21:25

## 2018-05-15 RX ADMIN — ACETAMINOPHEN 650 MG: 325 TABLET ORAL at 21:25

## 2018-05-15 RX ADMIN — FAMOTIDINE 20 MG: 20 TABLET ORAL at 09:03

## 2018-05-15 RX ADMIN — INSULIN GLARGINE 6 UNITS: 100 INJECTION, SOLUTION SUBCUTANEOUS at 09:38

## 2018-05-15 RX ADMIN — ENOXAPARIN SODIUM 80 MG: 80 INJECTION SUBCUTANEOUS at 21:25

## 2018-05-15 NOTE — CDMP QUERY
Wound care nurse has documented pressure ulcers present on admit on 5-9-18, for coding purposes , please document that this pt has     =>Decubitus ulcers to Sacrum, scrotum , thigh and ankle with wound care assessment and treatment   =>Other Explanation of clinical findings  =>Unable to Determine (no explanation of clinical findings)    The medical record reflects the following:    Risk: pt with hx of Cva with hemiparesis, NH resident     Clinical Indicators:  wound care notes docuement Pressure ulcers in various stages     Treatment:   wound cares     Please clarify and document your clinical opinion in the progress notes and discharge summary including the definitive and/or presumptive diagnosis, (suspected or probable), related to the above clinical findings. Please include clinical findings supporting your diagnosis. If you DECLINE this query or would like to communicate with [facility], please utilize the \"[facility] message box\" at the TOP of the Progress Note on the right.       Thank you,  [facility] Ivania RN/CCDS  [phone]

## 2018-05-15 NOTE — PROGRESS NOTES
Pulmonary progress note  History 80-year-old male admitted to the hospital on May 8, 2018 secondary to being found unconscious at his nursing home. Patient was found to be severely hypoxic. He has a history of stroke with left hemiparesis status post G-tube. Labs showed hypernatremia and acute renal failure secondary to dehydration. Neurology evaluation found no evidence of a recent right MCA territory stroke. Some of his injury may be related to dehydration and renal impairment. Nephrology consultation led to rehydration. Sodium of 158 on admission has now improved. Creatinine has improved from 3.56 to normal.  Albumin dropped to as low as 1.3. Blood cultures have grown staph hominis. This is an uncertain pathogen. He has been treated with vancomycin since admission. Central line placement was complicated by small left pneumothorax. Chest tube was placed and has subsequently been removed. Right lower lobe infiltrate versus atelectasis is improving. Left pneumothorax has resolved. He was extubated May 11. Today the patient did not respond to questions. Blood pressure 134/90, pulse 92, temperature 98 °F (36.7 °C), resp. rate 16, height 6' (1.829 m), weight 79.4 kg (175 lb), SpO2 96 %. CTA  RRR  Soft  Pedal but no leg edema, right greater than left    Labs: WBC is 11.2 with no bands   Sodium 150. BUN 30. creatinine 0.84. Albumin 1.5    Right lower extremity ultrasound shows acute DVT throughout the right leg. Assessment  Dehydration with hypernatremia and acute renal failure resolving  Hypoalbuminemia  Questionable staph hominis bacteremia s/p vancomycin therapy. Right lower extremity DVT    Plan  Continue rehydration  Continue Lovenox twice daily   Follow chest x-ray  Increase protein intake    Plans for palliative care meeting tomorrow noted.

## 2018-05-15 NOTE — PROGRESS NOTES
I put note in cc link that patient may dc late   tomorrow and called Jessie there. I let daughter know also, but I said she still had a meeting 1400 tomorrow as arranged. I will fill out \"will call\" form for patient to be dc'd tomorrow to [facility]. Jesse Masters PBibi Hawkins [address] Management  [phone], beeper [phone]

## 2018-05-15 NOTE — PROGRESS NOTES
RENAL PROGRESS NOTE        Lyric Sherman         Assessment/Plan:     · SIMIN (ischemic atn in a setting of volume depletion/sepsis/resp failure). Resolving. · Hypernatremia. Increase free water per peg, give as a continues infusion. · HTN. Better controlled, continue amlodipine. · Sepsis. Source? Pulm? On abx per ID. · Lt iatrogenic pneumothorax. CT pulled. Subjective:  Patient complaints off: Alert, ox2. Follows simple commands with rt hand. No cp/sob/n/v. States he is ok. Thirsty. Tolerates tf.          Patient Active Problem List   Diagnosis Code    Respiratory failure ([facility] J96.90    Encephalopathy G93.40    Dehydration E86.0    Lactic acidosis E87.2    Hypernatremia E87.0    SIMIN (acute kidney injury) ([facility] N17.9    CVA (cerebral vascular accident) ([facility] I63.9    Sepsis ([facility] A41.9    Elevated troponin R74.8    Postprocedural pneumothorax J95.811    Hyperglycemia R73.9    Elevated CK R74.8    Bacteremia R78.81       Current Facility-Administered Medications   Medication Dose Route Frequency Provider Last Rate Last Dose    amLODIPine (NORVASC) tablet 5 mg  5 mg Oral DAILY Parisa JACINTO MD   5 mg at 05/15/18 0908    insulin glargine (LANTUS) injection 6 Units  6 Units SubCUTAneous Q12H Gregory Kee, DO   6 Units at 05/15/18 7657    enoxaparin (LOVENOX) injection 80 mg  1 mg/kg SubCUTAneous Q12H Gregory Kee, DO   80 mg at 05/15/18 2431    famotidine (PEPCID) tablet 20 mg  20 mg Oral BID Riky Yeboah DO   20 mg at 05/15/18 1720    hydrALAZINE (APRESOLINE) 20 mg/mL injection 20 mg  20 mg IntraVENous Q6H PRN Riky Yeboah DO        hydrocortisone Sod Succ (PF) (SOLU-CORTEF) injection 50 mg  50 mg IntraVENous DAILY Elder Lopez MD   50 mg at 05/15/18 0903    midazolam (VERSED) injection 1 mg  1 mg IntraVENous Q3H PRN Catherin Hearing Joan Mar DO        aspirin (ASPIRIN) tablet 325 mg  325 mg Oral DAILY Mili Garcia MD   325 mg at 05/15/18 2160    glucose chewable tablet 16 g  4 Tab Oral PRN Marck Brown MD        glucagon (GLUCAGEN) injection 1 mg  1 mg IntraMUSCular PRN Marck Brown MD        dextrose (D50W) injection syrg 12.5-25 g  25-50 mL IntraVENous PRN Marck Brown MD   12.5 g at 05/10/18 1316    collagenase (SANTYL) 250 unit/gram ointment   Topical DAILY Laina Hayes MD        insulin lispro (HUMALOG) injection   SubCUTAneous Q6H Marck Brown MD   Stopped at 05/15/18 0600    sodium chloride (NS) flush 5-10 mL  5-10 mL IntraVENous PRN Fadi Davila MD   10 mL at 05/10/18 0511    sodium chloride (NS) flush 5-10 mL  5-10 mL IntraVENous Q8H Marck Brown MD   10 mL at 05/15/18 0908    sodium chloride (NS) flush 5-10 mL  5-10 mL IntraVENous PRN Marck Brown MD   10 mL at 05/09/18 2225    fentaNYL citrate (PF) injection 50 mcg  50 mcg IntraVENous Q4H PRN Mili Garcia MD   50 mcg at 05/09/18 1545       Objective  Vitals:    05/14/18 1346 05/14/18 2036 05/15/18 0546 05/15/18 0908   BP: 148/90 157/77 (!) 143/92 119/72   Pulse: 92 88 86 83   Resp: 16 18 16 16   Temp: 98 °F (36.7 °C) 98.7 °F (37.1 °C) 98.5 °F (36.9 °C) 98.3 °F (36.8 °C)   SpO2: 98% 92% 95% 94%   Weight:       Height:             Intake/Output Summary (Last 24 hours) at 05/15/18 1008  Last data filed at 05/15/18 0740   Gross per 24 hour   Intake             1200 ml   Output             2500 ml   Net            -1300 ml           Admission weight: Weight: 72.6 kg (160 lb) (05/08/18 1420)  Last Weight Metrics:  Weight Loss Metrics 5/12/2018 5/8/2018   Today's Wt 175 lb -   BMI - 23.73 kg/m2             Physical Assessment:     General: NAD, alert. Dry oral mucosa. Neck: No jvd. LUNGS: good air entry, bl exp rhonchi. CVS EXM: S1, S2  RRR.   Abdomen: soft, non tender. Peg in place. Lower Extremities:  trace edema. Lab    CBC w/Diff Recent Labs      05/15/18   0534  05/14/18   0745   WBC  11.2  10.4   RBC  4.11*  4.43*   HGB  11.4*  12.5*   HCT  34.8*  37.8   PLT  134*  124*   GRANS  71  76*   LYMPH  17*  16*   EOS  3  0        Chemistry Recent Labs      05/15/18   0534  05/14/18   0745  05/13/18   0420   GLU  98  168*  210*   NA  150*  149*  151*   K  3.6  3.6  4.0   CL  116*  117*  118*   CO2  28  22  22   BUN  30*  36*  46*   CREA  0.84  0.87  1.17   CA  7.8*  7.3*  7.6*   AGAP  6  10  11   BUCR  36*  41*  39*   ALB  1.5*  1.6*  1.5*   PHOS  1.9*  2.3*  1.8*         No results found for: IRON, FE, TIBC, IBCT, PSAT, FERR   Lab Results   Component Value Date/Time    Calcium 7.8 (L) 05/15/2018 05:34 AM    Phosphorus 1.9 (L) 05/15/2018 05:34 AM        Fred Pardo M.D.   [facility]  Phone

## 2018-05-15 NOTE — PROGRESS NOTES
Problem: Falls - Risk of  Goal: *Absence of Falls  Document Blanca Fall Risk and appropriate interventions in the flowsheet. Outcome: Progressing Towards Goal  Fall Risk Interventions:  Mobility Interventions: Communicate number of staff needed for ambulation/transfer, Patient to call before getting OOB    Mentation Interventions: Bed/chair exit alarm, Door open when patient unattended    Medication Interventions: Patient to call before getting OOB    Elimination Interventions: Call light in reach, Patient to call for help with toileting needs             Problem: Pressure Injury - Risk of  Goal: *Prevention of pressure injury  Document Rene Scale and appropriate interventions in the flowsheet.    Outcome: Progressing Towards Goal  Pressure Injury Interventions:  Sensory Interventions: Assess changes in LOC, Assess need for specialty bed, Float heels, Keep linens dry and wrinkle-free, Pressure redistribution bed/mattress (bed type)    Moisture Interventions: Apply protective barrier, creams and emollients, Absorbent underpads, Internal/External fecal devices, Internal/External urinary devices    Activity Interventions: Pressure redistribution bed/mattress(bed type)    Mobility Interventions: HOB 30 degrees or less, Pressure redistribution bed/mattress (bed type)    Nutrition Interventions: Document food/fluid/supplement intake    Friction and Shear Interventions: Foam dressings/transparent film/skin sealants, HOB 30 degrees or less

## 2018-05-15 NOTE — PROGRESS NOTES
Infectious Disease Follow-up     Admit Date: 5/8/2018    Current abx Prior abx    None 5/14 - 1 Zosyn 5/8-3  Vanco 5/8-6      ASSESSMENT: -- RECOMMENDATIONS       NEW DVT RLE - extensive  - PVL 5/14: Acute occlusive DVT R CF, deep femoral, prox femoral, mid femoral, distal femoral, popliteal, post tibial, peroneal -> per primary team   Positive blood culture - 1 of 1 blcx on admission with Staph hominis  - drawn in ED and no sig associated fever, leukocytosis  - 2D echo 5/9: no valve vegetation  - rpt blcx 5/11 - NGTD x 2  - likely contaminant but received 6 days IV vancomycin -> maintain off abx  -> will be available as needed   Sacral decubitus with eschar  - unable to know exact depth but doesn't appear to be deep  - not infected-appearing - Continue with [address]  - LW per  care recommendations   Acute hypoxic resp failure- s/p intubation 5/8  - CXR with no definite pulm infiltrates/PNA  - extubated 5/11 -> per PCCM   Doubtful Sepsis on admission  -  AMS, elevated LA, Cr and with severe hypoxia and hypotension on presentation requiring pressors  - could have been UTI but no ucx sent and only blcx 1 of 1 was +ve -> monitor   Encephalopathy- Improving - monitor   H/o CVA - CT brain raised concern for subacute/ chronic infarcts but MRI/MRA not s/o same - persistent left hemiparesis   SIMIN- unclear baseline  - with hypernatremia  - resolved - avoid nephrotoxic meds  - renal following   S/p Left small pneumothorax - suspect sec to central line placement  - s/p requirement of chest tube placement by Dr Bhardwaj Hem   - CT removed by surgery on 5/10  - Pt with min Subcut. air around the chest tube site.   - monitor   Hyperglycemia     Chronic sawant cath- neg UA and no Ucx  - looks like sawant was changed on 5/8 - monitor        MICROBIOLOGY:   5/8 blcx x1 Staph hominis   5/11 blcx NGTD x 2     LINES/DRAINS:      Peg  Central line 5/8  Sawant cath     Active Hospital Problems    Diagnosis Date Noted    Bacteremia 05/10/2018  Sepsis ([facility] 05/09/2018    Elevated troponin 05/09/2018    Postprocedural pneumothorax 05/09/2018    Hyperglycemia 05/09/2018    Elevated CK 05/09/2018    Respiratory failure ([facility] 05/08/2018    Encephalopathy 05/08/2018    Dehydration 05/08/2018    Lactic acidosis 05/08/2018    Hypernatremia 05/08/2018    SIMIN (acute kidney injury) ([facility] 05/08/2018    CVA (cerebral vascular accident) ([facility] 05/08/2018       Subjective: Interval notes reviewed. Asleep. Easily awakened. Aphasic. Says knee hurts.   Afebrile WBC count normal    Current Facility-Administered Medications   Medication Dose Route Frequency    amLODIPine (NORVASC) tablet 5 mg  5 mg Oral DAILY    insulin glargine (LANTUS) injection 6 Units  6 Units SubCUTAneous Q12H    enoxaparin (LOVENOX) injection 80 mg  1 mg/kg SubCUTAneous Q12H    famotidine (PEPCID) tablet 20 mg  20 mg Oral BID    hydrALAZINE (APRESOLINE) 20 mg/mL injection 20 mg  20 mg IntraVENous Q6H PRN    hydrocortisone Sod Succ (PF) (SOLU-CORTEF) injection 50 mg  50 mg IntraVENous DAILY    midazolam (VERSED) injection 1 mg  1 mg IntraVENous Q3H PRN    aspirin (ASPIRIN) tablet 325 mg  325 mg Oral DAILY    glucose chewable tablet 16 g  4 Tab Oral PRN    glucagon (GLUCAGEN) injection 1 mg  1 mg IntraMUSCular PRN    dextrose (D50W) injection syrg 12.5-25 g  25-50 mL IntraVENous PRN    collagenase (SANTYL) 250 unit/gram ointment   Topical DAILY    insulin lispro (HUMALOG) injection   SubCUTAneous Q6H    sodium chloride (NS) flush 5-10 mL  5-10 mL IntraVENous PRN    sodium chloride (NS) flush 5-10 mL  5-10 mL IntraVENous Q8H    sodium chloride (NS) flush 5-10 mL  5-10 mL IntraVENous PRN    fentaNYL citrate (PF) injection 50 mcg  50 mcg IntraVENous Q4H PRN         Objective:     Visit Vitals    /72 (BP 1 Location: Right arm, BP Patient Position: At rest)    Pulse 83    Temp 98.3 °F (36.8 °C)    Resp 16    Ht 6' (1.829 m)    Wt 79.4 kg (175 lb)    SpO2 94%  BMI 23.73 kg/m2       Temp (24hrs), Av.4 °F (36.9 °C), Min:98 °F (36.7 °C), Max:98.7 °F (37.1 °C)     General: Well developed, 76y.o. year-old, male   HEENT: Normocephalic, anicteric sclerae, Pupils equal, round reactive to light, no oropharyngeal lesions. No sinus tenderness. Neck: Supple, central line+, no lymphadenopathy, masses or thyromegaly  Chest: Symmetrical expansion, dec AE left side,  Lungs: Crackles+ bilaterally, no dullness  Heart: Regular rhythm, no murmurs, rubs or gallops, No JVD  Abdomen: Soft, non-tender,non distended, no organomegaly, BS+, PEG+  Musculoskeletal: 2+ edema. R>L LE   CNS: Awake,alert on vent so limited exam, no NR, able to move rt side on command  SKIN: SAcral ulcer- not examined today    Labs: Results:   Chemistry Recent Labs      05/15/18   0534  18   0745  18   0420   GLU  98  168*  210*   NA  150*  149*  151*   K  3.6  3.6  4.0   CL  116*  117*  118*   CO2  28  22  22   BUN  30*  36*  46*   CREA  0.84  0.87  1.17   CA  7.8*  7.3*  7.6*   AGAP  6  10  11   BUCR  36*  41*  39*   ALB  1.5*  1.6*  1.5*      CBC w/Diff Recent Labs      05/15/18   0534  18   0745   WBC  11.2  10.4   RBC  4.11*  4.43*   HGB  11.4*  12.5*   HCT  34.8*  37.8   PLT  134*  124*   GRANS  71  76*   LYMPH  17*  16*   EOS  3  0            Microbiology Results  No results for input(s): SDES, CULT in the last 72 hours.     Serina Dasilva MD  May 15, 2018  [facility] AT THE [facility]  [phone]

## 2018-05-15 NOTE — PALLIATIVE CARE
Thank you for consult. Have arranged family conference/goals of care discussion with patient's daughter Ms. Rafael Ambrose at 1400 tomorrow May 16th.

## 2018-05-15 NOTE — PROGRESS NOTES
Bedside report given by Edilia Gupta RN. Pt in bed sleeping no s/s of distress or pain noted. Pt has peg tube feeding infusing osmolite 1.0 neno @75 ml/hr. FMS in place draining. Skin assesment done with Edilia Gupta Rn  Dressings intact. HOB elevated bed low and in locked position. Right extremity has +3-4 pitting edema. 2100- FMS leaking pt cleaned and made comfortable in bed    0500 Pt given abed bath wound care done as ordered pt turned and repositioned made comfortable in in bed. Mouth care done. Harish Jacob 5727- Bedside and Verbal shift change report given to Edilia Gupta RN (oncoming nurse) by Clare Suarez RN (offgoing nurse). Report included the following information SBAR, Kardex, Intake/Output, MAR and Recent Results.

## 2018-05-15 NOTE — PROGRESS NOTES
Assumed care of patient from Matthew Stevenson RN (offgoing nurse). Patient resting in bed, no signs of distress or discomfort noted at this time. Patient turned with offgoing nurse. Bed locked and in lowest position with call bell in reach. 1940- Bedside and Verbal shift change report given to Nallely Booker RN   (oncoming nurse) by Haydee Holley (offgoing nurse). Report included the following information SBAR, Kardex, Intake/Output, MAR and Recent Results.

## 2018-05-15 NOTE — PROGRESS NOTES
Problem: Falls - Risk of  Goal: *Absence of Falls  Document Blanca Fall Risk and appropriate interventions in the flowsheet. Outcome: Progressing Towards Goal  Fall Risk Interventions:       Mentation Interventions: Door open when patient unattended    Medication Interventions: Patient to call before getting OOB    Elimination Interventions: Call light in reach, Patient to call for help with toileting needs             Problem: Pressure Injury - Risk of  Goal: *Prevention of pressure injury  Document Rene Scale and appropriate interventions in the flowsheet.    Outcome: Progressing Towards Goal  Pressure Injury Interventions:  Sensory Interventions: Assess changes in LOC, Keep linens dry and wrinkle-free, Suspension boots    Moisture Interventions: Absorbent underpads, Apply protective barrier, creams and emollients, Check for incontinence Q2 hours and as needed, Internal/External fecal devices, Internal/External urinary devices    Activity Interventions: Pressure redistribution bed/mattress(bed type)    Mobility Interventions: HOB 30 degrees or less, Pressure redistribution bed/mattress (bed type)    Nutrition Interventions: Document food/fluid/supplement intake    Friction and Shear Interventions: HOB 30 degrees or less

## 2018-05-15 NOTE — PROGRESS NOTES
05/15/18 Chart reviewed. Plan is for patient to return to [facility] [place], will need us to arrange for transportation. Case management to follow. Rishabh Ponces.  [address] Management  [phone], beeper [phone]

## 2018-05-15 NOTE — PROGRESS NOTES
Patient has [facility], transportation goes thru [facility] [facility], unable to put on \"will call\". We have to call at dc and they will tell us they have a 3 hour window to pick patient up. Transport form placed on chart and copy given to Angy Oh, Care management Specialist. She can call for transport when we tell her patient is dc'd.

## 2018-05-15 NOTE — PROGRESS NOTES
[facility]  Hospitalist Division        Inpatient Daily Progress Note    Daily progress Note    Patient: Mirela Houston MRN: [id number]  CSN: [id number]    YOB: 1950  Age: 76 y.o. Sex: male    DOA: 5/8/2018 LOS:  LOS: 7 days                    Chief Complaint:  Encephalopathy    Interval History: 76 y.o.  male who presented to the ED on 5/8/2018 via EMS unresponsive. Patient  is a resident at [facility] with hx stroke with Left hemiparesis and PEG tube. Oxygen Sats 40's in the field and was intubated by EMS PTA. In the ED code S was called. Seen by tele neurology. CT head was suspect for acute/subacute infarct, however MRI brain does not show acute infarct. Chest tube for small left pneumothorax, surgery removed chest tube on 5/10/2018. He began to improve and able to follow some commands. Pt was extubated on 5/11/2018. SIMIN present, slowly improving with IVF- nephrology following. Blood cultures showing Staph Hominis, ID consulted on 3/43/6598- per note, complicated situation as unclear significance of bacteremia as only one blood culture set drawn, could also be skin justin, source could be sacral decubitus or just a contaminant. Echo without any valve vegetation. Pt was already on IV Vancomycin. Repeat blood cultures obtained 5/11/2018 NGTD x 3 days, IV Vancomycin dc'd on 5/14/2018 as recommended per ID note. Hypernatremia present, slowly improving with IVF and free water flushes. Pt was transferred to the floor on 5/13/2018. Unclear of patient's baseline mental status.       Assessment/Plan:     Patient Active Problem List   Diagnosis Code    Respiratory failure ([facility] J96.90    Encephalopathy G93.40    Dehydration E86.0    Lactic acidosis E87.2    Hypernatremia E87.0    SIMIN (acute kidney injury) ([address] N17.9    CVA (cerebral vascular accident) ([address] I63.9    Sepsis ([address] A41.9    Elevated troponin R74.8    Postprocedural pneumothorax J95.811    Hyperglycemia R73.9    Elevated CK R74.8    Bacteremia R78.81       A/P:  DVT RLE  - PVL's 5/15/2018 with DVT noted right common femoral, proximal femoral, mid femoral and distal femoral veins, subacute occlusive DVT in popliteal above knee, popliteal fossa, below knee, posterior tibial veins   - Lovenox BID      Encephalopathy   - metabolic, resolving   - MRI brain negative for acute infarct  - Neurology following      Respiratory Failure   - intubated PTA   - Intensivist consulted   - extubated 5/11, wean 02 as tolerated      Bacteremia   - blood culture x 1 set collected 5/8/2018 with staph hominis  - ID following, appreciate input  - Echo 5/9/2018 negative for valve vegetation  - repeat blood cultures collected 5/11/2018 NGTD x 3 days  - per ID note, could be contaminant or   - IV Vancomycin dc'd 5/14  - ID signed off on 5/15/2018      Hypernatremia   - improving, continue to monitor BMP  - free water flushes via PEG  - nephrology following, appreciate input      Lactic acidosis   - resolved      SIMIN  - nephrology following, appreciate input  - improving, continue to monitor      Elevated troponin  - demand       Hx stroke   - MRI brain shows chronic infarction . No acute infarct  -  mg daily      Pneumothorax  - small left pneumothorax, chest tube removed on 5/10/2018  - repeat CXR 5/14/2018 subcutaneous emphysema along left chest wall, no pneumothorax identified      Diabetes  - accuchecks, correctional SSI, Lantus 6 units BID    DVT Prophylaxis  - Heparin subcutaneously TID    Disposition  - Palliative care consulted today, plans to meet with daughter on 5/16 @2pm  - plan to return to [address] [place] [address]  Pager:  [phone]  Office:  [phone]        Subjective:      DVT in RLE, started on Lovenox BID. No events overnight. Denies chest pain or SOB.      Objective:      Visit Vitals    /90 (BP 1 Location: Right arm, BP Patient Position: At rest)    Pulse 92    Temp 98 °F (36.7 °C)    Resp 16    Ht 6' (1.829 m)    Wt 79.4 kg (175 lb)    SpO2 96%    BMI 23.73 kg/m2           Physical Exam:  General appearance: no acute distress noted  Head: Normocephalic, without obvious abnormality, atraumatic  Lungs: clear to auscultation bilaterally  Heart: regular rate and rhythm, S1, S2 normal, no murmur, click, rub or gallop  Abdomen: soft, non tender, non distended. Normoactive bowel sounds.    Extremities: 2+ edema RLE, 1+ edema LLE, no cyanosis, 1+ edema LUE  Skin: Skin color, texture, turgor normal. No rashes or lesions  Neurologic: A/O to self, follows simple commands, contracted LUE      Intake and Output:  Current Shift:  05/15 0701 - 05/15 1900  In: -   Out: 300 [Urine:300]  Last three shifts:  05/13 1901 - 05/15 0700  In: 2505 [I.V.:1305]  Out: 4400 [Urine:3000; Drains:1400]    Recent Results (from the past 24 hour(s))   GLUCOSE, POC    Collection Time: 05/14/18  5:42 PM   Result Value Ref Range    Glucose (POC) 140 (H) 70 - 110 mg/dL   GLUCOSE, POC    Collection Time: 05/14/18 11:54 PM   Result Value Ref Range    Glucose (POC) 155 (H) 70 - 110 mg/dL   RENAL FUNCTION PANEL    Collection Time: 05/15/18  5:34 AM   Result Value Ref Range    Sodium 150 (H) 136 - 145 mmol/L    Potassium 3.6 3.5 - 5.5 mmol/L    Chloride 116 (H) 100 - 108 mmol/L    CO2 28 21 - 32 mmol/L    Anion gap 6 3.0 - 18 mmol/L    Glucose 98 74 - 99 mg/dL    BUN 30 (H) 7.0 - 18 MG/DL    Creatinine 0.84 0.6 - 1.3 MG/DL    BUN/Creatinine ratio 36 (H) 12 - 20      GFR est AA >60 >60 ml/min/1.73m2    GFR est non-AA >60 >60 ml/min/1.73m2    Calcium 7.8 (L) 8.5 - 10.1 MG/DL    Phosphorus 1.9 (L) 2.5 - 4.9 MG/DL    Albumin 1.5 (L) 3.4 - 5.0 g/dL   CBC WITH AUTOMATED DIFF    Collection Time: 05/15/18  5:34 AM   Result Value Ref Range    WBC 11.2 4.6 - 13.2 K/uL    RBC 4.11 (L) 4.70 - 5.50 M/uL    HGB 11.4 (L) 13.0 - 16.0 g/dL    HCT 34.8 (L) 36.0 - 48.0 %    MCV 84.7 74.0 - 97.0 FL    MCH 27.7 24.0 - 34.0 PG    MCHC 32.8 31.0 - 37.0 g/dL    RDW 15.8 (H) 11.6 - 14.5 %    PLATELET 008 (L) 182 - 420 K/uL    MPV 10.4 9.2 - 11.8 FL    NEUTROPHILS 71 40 - 73 %    LYMPHOCYTES 17 (L) 21 - 52 %    MONOCYTES 9 3 - 10 %    EOSINOPHILS 3 0 - 5 %    BASOPHILS 0 0 - 2 %    ABS. NEUTROPHILS 8.0 1.8 - 8.0 K/UL    ABS. LYMPHOCYTES 1.9 0.9 - 3.6 K/UL    ABS. MONOCYTES 1.0 0.05 - 1.2 K/UL    ABS. EOSINOPHILS 0.4 0.0 - 0.4 K/UL    ABS. BASOPHILS 0.0 0.0 - 0.06 K/UL    DF AUTOMATED     MAGNESIUM    Collection Time: 05/15/18  5:34 AM   Result Value Ref Range    Magnesium 2.2 1.6 - 2.6 mg/dL   GLUCOSE, POC    Collection Time: 05/15/18  6:20 AM   Result Value Ref Range    Glucose (POC) 101 70 - 110 mg/dL   GLUCOSE, POC    Collection Time: 05/15/18 11:25 AM   Result Value Ref Range    Glucose (POC) 179 (H) 70 - 110 mg/dL           Lab Results   Component Value Date/Time    Glucose 98 05/15/2018 05:34 AM    Glucose 168 (H) 05/14/2018 07:45 AM    Glucose 210 (H) 05/13/2018 04:20 AM    Glucose 200 (H) 05/12/2018 03:30 AM    Glucose 204 (H) 05/11/2018 03:50 AM        Imaging:  No results found.     Medication List Reviewed:  Current Facility-Administered Medications   Medication Dose Route Frequency    acetaminophen (TYLENOL) tablet 650 mg  650 mg Oral AC&HS    amLODIPine (NORVASC) tablet 5 mg  5 mg Oral DAILY    insulin glargine (LANTUS) injection 6 Units  6 Units SubCUTAneous Q12H    enoxaparin (LOVENOX) injection 80 mg  1 mg/kg SubCUTAneous Q12H    famotidine (PEPCID) tablet 20 mg  20 mg Oral BID    hydrALAZINE (APRESOLINE) 20 mg/mL injection 20 mg  20 mg IntraVENous Q6H PRN    hydrocortisone Sod Succ (PF) (SOLU-CORTEF) injection 50 mg  50 mg IntraVENous DAILY    midazolam (VERSED) injection 1 mg  1 mg IntraVENous Q3H PRN    aspirin (ASPIRIN) tablet 325 mg  325 mg Oral DAILY    glucose chewable tablet 16 g  4 Tab Oral PRN    glucagon (GLUCAGEN) injection 1 mg  1 mg IntraMUSCular PRN    dextrose (D50W) injection syrg 12.5-25 g  25-50 mL IntraVENous PRN    collagenase (SANTYL) 250 unit/gram ointment   Topical DAILY    insulin lispro (HUMALOG) injection   SubCUTAneous Q6H    sodium chloride (NS) flush 5-10 mL  5-10 mL IntraVENous PRN    sodium chloride (NS) flush 5-10 mL  5-10 mL IntraVENous Q8H    sodium chloride (NS) flush 5-10 mL  5-10 mL IntraVENous PRN    fentaNYL citrate (PF) injection 50 mcg  50 mcg IntraVENous Q4H PRN

## 2018-05-16 VITALS
WEIGHT: 175 LBS | HEIGHT: 72 IN | OXYGEN SATURATION: 97 % | SYSTOLIC BLOOD PRESSURE: 137 MMHG | TEMPERATURE: 97.7 F | HEART RATE: 82 BPM | DIASTOLIC BLOOD PRESSURE: 70 MMHG | RESPIRATION RATE: 15 BRPM | BODY MASS INDEX: 23.7 KG/M2

## 2018-05-16 LAB
ALBUMIN SERPL-MCNC: 1.4 G/DL (ref 3.4–5)
ANION GAP SERPL CALC-SCNC: 7 MMOL/L (ref 3–18)
BASOPHILS # BLD: 0 K/UL (ref 0–0.06)
BASOPHILS NFR BLD: 0 % (ref 0–2)
BUN SERPL-MCNC: 25 MG/DL (ref 7–18)
BUN/CREAT SERPL: 33 (ref 12–20)
CALCIUM SERPL-MCNC: 7.5 MG/DL (ref 8.5–10.1)
CHLORIDE SERPL-SCNC: 110 MMOL/L (ref 100–108)
CO2 SERPL-SCNC: 27 MMOL/L (ref 21–32)
CREAT SERPL-MCNC: 0.76 MG/DL (ref 0.6–1.3)
DIFFERENTIAL METHOD BLD: ABNORMAL
EOSINOPHIL # BLD: 0.3 K/UL (ref 0–0.4)
EOSINOPHIL NFR BLD: 3 % (ref 0–5)
ERYTHROCYTE [DISTWIDTH] IN BLOOD BY AUTOMATED COUNT: 16.4 % (ref 11.6–14.5)
GLUCOSE BLD STRIP.AUTO-MCNC: 114 MG/DL (ref 70–110)
GLUCOSE BLD STRIP.AUTO-MCNC: 139 MG/DL (ref 70–110)
GLUCOSE BLD STRIP.AUTO-MCNC: 142 MG/DL (ref 70–110)
GLUCOSE BLD STRIP.AUTO-MCNC: 72 MG/DL (ref 70–110)
GLUCOSE SERPL-MCNC: 66 MG/DL (ref 74–99)
HCT VFR BLD AUTO: 35.4 % (ref 36–48)
HGB BLD-MCNC: 11.8 G/DL (ref 13–16)
LYMPHOCYTES # BLD: 1.9 K/UL (ref 0.9–3.6)
LYMPHOCYTES NFR BLD: 20 % (ref 21–52)
MCH RBC QN AUTO: 28.3 PG (ref 24–34)
MCHC RBC AUTO-ENTMCNC: 33.3 G/DL (ref 31–37)
MCV RBC AUTO: 84.9 FL (ref 74–97)
MONOCYTES # BLD: 0.7 K/UL (ref 0.05–1.2)
MONOCYTES NFR BLD: 7 % (ref 3–10)
NEUTS SEG # BLD: 6.8 K/UL (ref 1.8–8)
NEUTS SEG NFR BLD: 70 % (ref 40–73)
PHOSPHATE SERPL-MCNC: 2.3 MG/DL (ref 2.5–4.9)
PLATELET # BLD AUTO: 135 K/UL (ref 135–420)
PMV BLD AUTO: 10.3 FL (ref 9.2–11.8)
POTASSIUM SERPL-SCNC: 3.5 MMOL/L (ref 3.5–5.5)
RBC # BLD AUTO: 4.17 M/UL (ref 4.7–5.5)
SODIUM SERPL-SCNC: 144 MMOL/L (ref 136–145)
WBC # BLD AUTO: 9.7 K/UL (ref 4.6–13.2)

## 2018-05-16 PROCEDURE — 74011636637 HC RX REV CODE- 636/637: Performed by: HOSPITALIST

## 2018-05-16 PROCEDURE — 85025 COMPLETE CBC W/AUTO DIFF WBC: CPT | Performed by: INTERNAL MEDICINE

## 2018-05-16 PROCEDURE — 74011250637 HC RX REV CODE- 250/637: Performed by: INTERNAL MEDICINE

## 2018-05-16 PROCEDURE — 77030018798 HC PMP KT ENTRL FED COVD -A

## 2018-05-16 PROCEDURE — 74011250636 HC RX REV CODE- 250/636: Performed by: INTERNAL MEDICINE

## 2018-05-16 PROCEDURE — 80069 RENAL FUNCTION PANEL: CPT | Performed by: INTERNAL MEDICINE

## 2018-05-16 PROCEDURE — 36415 COLL VENOUS BLD VENIPUNCTURE: CPT | Performed by: INTERNAL MEDICINE

## 2018-05-16 PROCEDURE — 74011250637 HC RX REV CODE- 250/637: Performed by: FAMILY MEDICINE

## 2018-05-16 PROCEDURE — 74011250636 HC RX REV CODE- 250/636: Performed by: HOSPITALIST

## 2018-05-16 PROCEDURE — 82962 GLUCOSE BLOOD TEST: CPT

## 2018-05-16 RX ORDER — PREDNISONE 20 MG/1
40 TABLET ORAL
Qty: 10 TAB | Refills: 0 | Status: SHIPPED | OUTPATIENT
Start: 2018-05-16 | End: 2018-05-21

## 2018-05-16 RX ORDER — AMLODIPINE BESYLATE 10 MG/1
10 TABLET ORAL DAILY
Status: DISCONTINUED | OUTPATIENT
Start: 2018-05-17 | End: 2018-05-16 | Stop reason: HOSPADM

## 2018-05-16 RX ORDER — ASPIRIN 325 MG
325 TABLET ORAL DAILY
Qty: 30 TAB | Refills: 0 | Status: SHIPPED
Start: 2018-05-17

## 2018-05-16 RX ORDER — AMLODIPINE BESYLATE 10 MG/1
10 TABLET ORAL DAILY
Qty: 30 TAB | Refills: 0 | Status: SHIPPED
Start: 2018-05-17

## 2018-05-16 RX ADMIN — FAMOTIDINE 20 MG: 20 TABLET ORAL at 10:33

## 2018-05-16 RX ADMIN — Medication 10 ML: at 01:28

## 2018-05-16 RX ADMIN — ASPIRIN 325 MG ORAL TABLET 325 MG: 325 PILL ORAL at 10:33

## 2018-05-16 RX ADMIN — COLLAGENASE SANTYL: 250 OINTMENT TOPICAL at 10:39

## 2018-05-16 RX ADMIN — HYDROCORTISONE SODIUM SUCCINATE 50 MG: 100 INJECTION, POWDER, FOR SOLUTION INTRAMUSCULAR; INTRAVENOUS at 10:33

## 2018-05-16 RX ADMIN — Medication 10 ML: at 15:09

## 2018-05-16 RX ADMIN — ENOXAPARIN SODIUM 80 MG: 80 INJECTION SUBCUTANEOUS at 10:33

## 2018-05-16 RX ADMIN — INSULIN GLARGINE 6 UNITS: 100 INJECTION, SOLUTION SUBCUTANEOUS at 10:32

## 2018-05-16 RX ADMIN — AMLODIPINE BESYLATE 5 MG: 5 TABLET ORAL at 10:33

## 2018-05-16 RX ADMIN — ACETAMINOPHEN 650 MG: 325 TABLET ORAL at 10:33

## 2018-05-16 NOTE — PROGRESS NOTES
Spoke to [facility], she states pt ready to return to snf. Notified adriana at [employer], left message for pt's dtr. Sharon Andrade to set up transport.

## 2018-05-16 NOTE — PROGRESS NOTES
Problem: Falls - Risk of  Goal: *Absence of Falls  Document Blanca Fall Risk and appropriate interventions in the flowsheet. Outcome: Progressing Towards Goal  Fall Risk Interventions:  Mobility Interventions: Communicate number of staff needed for ambulation/transfer    Mentation Interventions: Bed/chair exit alarm    Medication Interventions: Patient to call before getting OOB    Elimination Interventions: Call light in reach             Problem: Pressure Injury - Risk of  Goal: *Prevention of pressure injury  Document Rene Scale and appropriate interventions in the flowsheet.    Outcome: Progressing Towards Goal  Pressure Injury Interventions:  Sensory Interventions: Assess changes in LOC    Moisture Interventions: Apply protective barrier, creams and emollients    Activity Interventions: Pressure redistribution bed/mattress(bed type)    Mobility Interventions: HOB 30 degrees or less    Nutrition Interventions: Document food/fluid/supplement intake    Friction and Shear Interventions: Foam dressings/transparent film/skin sealants

## 2018-05-16 NOTE — INTERDISCIPLINARY ROUNDS
IDR Summary      Patient: Eli Tan MRN: [id number]    Age: 76 y.o.  : 1950     Admit Diagnosis: Encephalopathy  Respiratory failure ([facility]  Encephalopathy  Respiratory failure (HCC)  Hypernatremia  SIMIN (acute kidney injury) ([facility]  Dehydration  Lactic acidosis        DIET status: Tube feed     Lines/Tubes:   IV: YES   Needed: YES  Flowers: NO  Needed:NO  Central Line: NO Needed: NO      VTE Prophylaxis: Chemical    Mobility needs: Yes     PT ordered:  NO PT eval on chart: NO    OT ordered:  NO OT eval on chart: NO      ST ordered:  NO ST eval on chart:  NO     Disposition/Care Management:  Discharge plan: [place] ordered? NO     Recommended DME from PT/OT:      DME ordered? NO     SNF- has patient been matched? YES    Accepting bed? YES   Does patient require insurance auth?   NO        Barriers to discharge:   Financial concerns:No   PCP: Rosa Ernst MD    : NO  Interventions:       LOS: 7 days     Expected days until discharge: 1-2 days            Signed:     Kenn Spurling, FNP-BC  [address]  Hospitalist Division  Pager:  [phone]  Office:  [phone]

## 2018-05-16 NOTE — ROUTINE PROCESS
Bedside and Verbal shift change report given to Walt Nguyen RN (oncoming nurse) by Keya Long RN, BSN (offgoing nurse). Report given with SBAR, Kardex, Intake/Output, MAR and Recent Results.

## 2018-05-16 NOTE — PROGRESS NOTES
Care Management Interventions  PCP Verified by CM:  Yes  Palliative Care Criteria Met (RRAT>21 & CHF Dx)?: No  Mode of Transport at Discharge: [facility]  Transition of Care Consult (CM Consult): [address] [place] [address] ([facility])  MyChart Signup: No  Discharge Durable Medical Equipment: No  Health Maintenance Reviewed: Yes  Physical Therapy Consult: No  Occupational Therapy Consult: No  Speech Therapy Consult: No  Current Support Network: Nursing Facility  Confirm Follow Up Transport: Other (see comment) (s transport)  Plan discussed with Pt/Family/Caregiver: Yes  [place] Resource Information Provided?: No  Discharge Location  Discharge Placement: [address] [place] [address]

## 2018-05-16 NOTE — PROGRESS NOTES
Pulmonary progress note  History 19-year-old male admitted to the hospital on May 8, 2018 secondary to being found unconscious at his nursing home. Patient was found to be severely hypoxic. He has a history of stroke with left hemiparesis status post G-tube. Labs showed hypernatremia and acute renal failure secondary to dehydration. Neurology evaluation found no evidence of a recent right MCA territory stroke. Some of his injury may be related to dehydration and renal impairment. Nephrology consultation led to rehydration. I mentioned to the patient today that there will be a discussion this afternoon addressing his subsequent plans for aggressive versus palliative care. I explained that the discussion will revolve around whether or not he is happy with his current life and medical therapy, or would he wish for his care to be less aggressive and more towards simply keeping him comfortable. I did not ask for the patient's answer, but did ask him if he understood what I was telling him. In the most clear voice that he has used since I have seen him, he said that he did understand. Blood pressure 137/70, pulse 82, temperature 97.7 °F (36.5 °C), resp. rate 15, height 6' (1.829 m), weight 79.4 kg (175 lb), SpO2 97 %. CTA  RRR  Soft  Pedal but no leg edema, right greater than left    Assessment  Dehydration with hypernatremia and acute renal failure   Hypoalbuminemia  Right lower extremity DVT    Plan  Continue rehydration  Continue Lovenox twice daily   Increase protein intake    Plans for discharge noted. Pulmonary will sign off and be available as necessary.

## 2018-05-16 NOTE — PROGRESS NOTES
RENAL PROGRESS NOTE        Mirela Houston         Assessment/Plan:     · SIMIN (ischemic atn in a setting of volume depletion/sepsis/resp failure). Resolved. · Hypernatremia. Resolving. Change free water per peg to 200 ml every 4 hours, give as a continues infusion. · HTN. Incease amlodipine. · Sepsis. Source? Pulm? On abx per ID. · Will s/o. Please call if any questions. Subjective:  Patient complaints off: Alert, ox2. Follows simple commands with rt hand. No cp/sob/n/v. States he is ok. Thirsty. Tolerates tf.          Patient Active Problem List   Diagnosis Code    Respiratory failure ([facility] J96.90    Encephalopathy G93.40    Dehydration E86.0    Lactic acidosis E87.2    Hypernatremia E87.0    SIMIN (acute kidney injury) ([facility] N17.9    CVA (cerebral vascular accident) ([facility] I63.9    Sepsis ([facility] A41.9    Elevated troponin R74.8    Postprocedural pneumothorax J95.811    Hyperglycemia R73.9    Elevated CK R74.8    Bacteremia R78.81       Current Facility-Administered Medications   Medication Dose Route Frequency Provider Last Rate Last Dose    acetaminophen (TYLENOL) tablet 650 mg  650 mg Oral AC&HS Beatriz Daly MD   650 mg at 05/16/18 1033    amLODIPine (NORVASC) tablet 5 mg  5 mg Oral DAILY You JACINTO MD   5 mg at 05/16/18 1033    insulin glargine (LANTUS) injection 6 Units  6 Units SubCUTAneous Q12H Ryan Schrader DO   6 Units at 05/16/18 1032    enoxaparin (LOVENOX) injection 80 mg  1 mg/kg SubCUTAneous Q12H Ryan Schrader DO   80 mg at 05/16/18 1033    famotidine (PEPCID) tablet 20 mg  20 mg Oral BID Timothy Greene DO   20 mg at 05/16/18 1033    hydrALAZINE (APRESOLINE) 20 mg/mL injection 20 mg  20 mg IntraVENous Q6H PRN Timothy Greene DO        hydrocortisone Sod Succ (PF) (SOLU-CORTEF) injection 50 mg  50 mg IntraVENous DAILY Naila Agosto MD   50 mg at 05/16/18 1033    midazolam (VERSED) injection 1 mg  1 mg IntraVENous Q3H PRN Rebel Connors DO        aspirin (ASPIRIN) tablet 325 mg  325 mg Oral DAILY Naila Agosto MD   325 mg at 05/16/18 1033    glucose chewable tablet 16 g  4 Tab Oral PRN Arina Gallegos MD        glucagon (GLUCAGEN) injection 1 mg  1 mg IntraMUSCular PRN Arina Gallegos MD        dextrose (D50W) injection syrg 12.5-25 g  25-50 mL IntraVENous PRN Arina Gallegos MD   12.5 g at 05/10/18 1316    collagenase (SANTYL) 250 unit/gram ointment   Topical DAILY Ifherbert Howe MD        insulin lispro (HUMALOG) injection   SubCUTAneous Q6H Arina Gallegos MD   Stopped at 05/15/18 1800    sodium chloride (NS) flush 5-10 mL  5-10 mL IntraVENous PRN Haris Olson MD   10 mL at 05/10/18 0511    sodium chloride (NS) flush 5-10 mL  5-10 mL IntraVENous Q8H Arina Gallegos MD   10 mL at 05/16/18 0128    sodium chloride (NS) flush 5-10 mL  5-10 mL IntraVENous PRN Arina Gallegos MD   10 mL at 05/09/18 2225    fentaNYL citrate (PF) injection 50 mcg  50 mcg IntraVENous Q4H PRN Naila Agosto MD   50 mcg at 05/09/18 1545       Objective  Vitals:    05/15/18 1828 05/15/18 2230 05/16/18 0554 05/16/18 1026   BP: 166/78 135/85 173/78 133/75   Pulse: 92 85 63 87   Resp: 16 16 16 16   Temp: 98.3 °F (36.8 °C) 98 °F (36.7 °C) 98 °F (36.7 °C) 98 °F (36.7 °C)   SpO2: 96% 98% 95% 99%   Weight:       Height:             Intake/Output Summary (Last 24 hours) at 05/16/18 1147  Last data filed at 05/16/18 0400   Gross per 24 hour   Intake             1075 ml   Output             1400 ml   Net             -325 ml           Admission weight: Weight: 72.6 kg (160 lb) (05/08/18 1420)  Last Weight Metrics:  Weight Loss Metrics 5/12/2018 5/8/2018   Today's Wt 175 lb -   BMI - 23.73 kg/m2             Physical Assessment:     General: NAD, alert.  Dry oral mucosa. Neck: No jvd. LUNGS: good air entry, bl exp rhonchi. CVS EXM: S1, S2  RRR. Abdomen: soft, non tender. Peg in place. Lower Extremities:  trace edema. Lab    CBC w/Diff Recent Labs      05/16/18   0650  05/15/18   0534  05/14/18   0745   WBC  9.7  11.2  10.4   RBC  4.17*  4.11*  4.43*   HGB  11.8*  11.4*  12.5*   HCT  35.4*  34.8*  37.8   PLT  135  134*  124*   GRANS  70  71  76*   LYMPH  20*  17*  16*   EOS  3  3  0        Chemistry Recent Labs      05/16/18   0650  05/15/18   0534  05/14/18   0745   GLU  66*  98  168*   NA  144  150*  149*   K  3.5  3.6  3.6   CL  110*  116*  117*   CO2  27  28  22   BUN  25*  30*  36*   CREA  0.76  0.84  0.87   CA  7.5*  7.8*  7.3*   AGAP  7  6  10   BUCR  33*  36*  41*   ALB  1.4*  1.5*  1.6*   PHOS  2.3*  1.9*  2.3*         No results found for: IRON, FE, TIBC, IBCT, PSAT, FERR   Lab Results   Component Value Date/Time    Calcium 7.5 (L) 05/16/2018 06:50 AM    Phosphorus 2.3 (L) 05/16/2018 06:50 AM        Abel Langston M.D.   [facility]  Phone

## 2018-05-16 NOTE — PROGRESS NOTES
Transition of Care (SHAAN) Plan:      SHAAN Transportation:  Snf/LTC at [address] [place] [address]     How is patient being transported at discharge? BLS     When? 5/16/18 5pm     Is transport scheduled? yes    Follow-up appointment and transportation:     PCP? 3-5 days     Specialist?     Time/Date? Who is transporting to the follow-up appointment? na      Is transport for follow up appointment scheduled? na    Communication plan (with patient/family): Who is being called? Patient or Next of Kin? Responsible party? Notified dtr mrs guy      What number(s) is to be used? What service provider is calling for [facility] services? When are they calling? Readmission Risk?   (Green/Low; Yellow/Moderate; Red/High): yellow moderate
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
## 2018-05-16 NOTE — PROGRESS NOTES
Problem: Falls - Risk of  Goal: *Absence of Falls  Document Blanca Fall Risk and appropriate interventions in the flowsheet. Outcome: Progressing Towards Goal  Fall Risk Interventions:  Mobility Interventions: Communicate number of staff needed for ambulation/transfer    Mentation Interventions: Adequate sleep, hydration, pain control    Medication Interventions: Evaluate medications/consider consulting pharmacy    Elimination Interventions: Call light in reach             Problem: Pressure Injury - Risk of  Goal: *Prevention of pressure injury  Document Rene Scale and appropriate interventions in the flowsheet.    Outcome: Progressing Towards Goal  Pressure Injury Interventions:  Sensory Interventions: Assess changes in LOC, Assess need for specialty bed, Float heels, Keep linens dry and wrinkle-free    Moisture Interventions: Absorbent underpads, Apply protective barrier, creams and emollients, Assess need for specialty bed    Activity Interventions: Pressure redistribution bed/mattress(bed type)    Mobility Interventions: HOB 30 degrees or less, Pressure redistribution bed/mattress (bed type)    Nutrition Interventions: Document food/fluid/supplement intake    Friction and Shear Interventions: Apply protective barrier, creams and emollients, Foam dressings/transparent film/skin sealants, HOB 30 degrees or less

## 2018-05-16 NOTE — PROGRESS NOTES
Transport set for 5pm . Pcs completed. Envelope in nurses station. Dc summary, scripts, placed in envelope. Notified pt, nurse and dtr mrs guy.

## 2018-05-16 NOTE — PROGRESS NOTES
Transportation at Discharge: 5/16/2018    Transport Company: [employer] (pts ins/they are required to arrange transport. [facility] has been requested for transport.       Reference number:[id number]    Estimated pick-up time: 5:00p    Method of Transport: Stretcher / BLS    Insurance Info: Medicare / [employer] [facility]  Authorization: [id number]    [facility] transportation arrangements at the request of [facility], [address] [facility] ext [phone]

## 2018-05-16 NOTE — PALLIATIVE CARE
Palliative medicine  Miriam Holloway ,and Tiffany Staley NP facilitated Advance Care Planning Discussion with Mr Fitch Schooling daughter Murali SIMMONS [facility]) bedside. Mr Andres Javier does not appear to have capacity to make complex medical decisions for himself at this time. Pt has not completed an advance directive in the past . Pt is ,he has three children. Surrogate MPOA would default to his three children however  two children have reportedly disowned him, leaving daughter Murali SIMMONS [facility]) ([phone]) as surrogate decision maker for pt. Goals of Care ;are to continue with present aggressive best supportive care to allow pt time to improve ,and \"to allow him time to make peace with the family . \"    Code status remains FULL CODE. Disposition ,daughter expects pt to return to [facility] for more rehabilitation, she is hoping the pt will be able to return to [facility] to live . Full consult note  to be completed.        Tiffany Garcia; [phone]

## 2018-05-16 NOTE — DISCHARGE SUMMARY
[facility]    Discharge Summary    Patient: Joan Amador MRN: [id number]  CSN: [id number]    YOB: 1950  Age: 76 y.o. Sex: male    DOA: 5/8/2018 LOS:  LOS: 8 days   Discharge Date:      Admission Diagnoses: Encephalopathy  Respiratory failure ([facility]  Encephalopathy  Respiratory failure ([facility]  Hypernatremia  SIMIN (acute kidney injury) ([facility]  Dehydration  Lactic acidosis    Discharge Diagnoses:    Problem List as of 5/16/2018  Never Reviewed          Codes Class Noted - Resolved    Bacteremia ICD-10-CM: R78.81  ICD-9-CM: 790.7  5/10/2018 - Present        Sepsis ([facility] ICD-10-CM: A41.9  ICD-9-CM: 038.9, 995.91  5/9/2018 - Present        Elevated troponin ICD-10-CM: R74.8  ICD-9-CM: 790.6  5/9/2018 - Present        Postprocedural pneumothorax ICD-10-CM: J95.811  ICD-9-CM: 512.1  5/9/2018 - Present        Hyperglycemia ICD-10-CM: R73.9  ICD-9-CM: 790.29  5/9/2018 - Present        Elevated CK ICD-10-CM: R74.8  ICD-9-CM: 790.5  5/9/2018 - Present        Respiratory failure ([facility] ICD-10-CM: J96.90  ICD-9-CM: 518.81  5/8/2018 - Present        * (Principal)Encephalopathy ICD-10-CM: G93.40  ICD-9-CM: 348.30  5/8/2018 - Present        Dehydration ICD-10-CM: E86.0  ICD-9-CM: 276.51  5/8/2018 - Present        Lactic acidosis ICD-10-CM: E87.2  ICD-9-CM: 276.2  5/8/2018 - Present        Hypernatremia ICD-10-CM: E87.0  ICD-9-CM: 276.0  5/8/2018 - Present        SIMIN (acute kidney injury) ([facility] ICD-10-CM: N17.9  ICD-9-CM: 584.9  5/8/2018 - Present        CVA (cerebral vascular accident) [facility]) ICD-10-CM: I63.9  ICD-9-CM: 434.91  5/8/2018 - Present              Discharge Condition: Stable    Discharge To: SNF    Consults: General Surgery, Hospitalist, Infectious Disease, Nephrology, Pulmonary/Critical Care and Safia Barclay 1137 Course: 76 y.o.  male who presented to the ED on 5/8/2018 via EMS unresponsive. Patient  is a resident at [facility] with hx stroke with Left hemiparesis and PEG tube. Oxygen Sats 40's in the field and was intubated by EMS PTA. In the ED code S was called. Seen by tele neurology. CT head was suspect for acute/subacute infarct, however MRI brain does not show acute infarct. Chest tube for small left pneumothorax, surgery removed chest tube on 5/10/2018. He began to improve and able to follow some commands. Pt was extubated on 5/11/2018. SIMIN present, slowly improving with IVF- nephrology following. Blood cultures showing Staph Hominis, ID consulted on 3/53/1581- per note, complicated situation as unclear significance of bacteremia as only one blood culture set drawn, could also be skin justin, source could be sacral decubitus or just a contaminant. Echo without any valve vegetation. Pt was already on IV Vancomycin. Repeat blood cultures obtained 5/11/2018 NGTD x 3 days, IV Vancomycin dc'd on 5/14/2018 as recommended per ID note. Hypernatremia present, slowly improving with IVF and free water flushes. Pt was transferred to the floor on 5/13/2018. Unclear of patient's baseline mental status. PVL RLE on 5/14/2018 with DVT noted right common femoral, proximal femoral, mid femoral and distal femoral veins, subacute occlusive DVT in popliteal above knee, popliteal fossa, below knee, posterior tibial veins. He was started on Lovenox BID then transitioned to Eliquis 10 mg BID on 5/16/2018 x 7 days, then he will need to take Eliquis 5 mg BID thereafter. Palliative care consulted and met with patient's daughter, wishes for pt to continue with aggressive therapy at this time and to remain a full code. Sodium levels returned to normal this morning. He is appropriate for discharge back to [facility], follow up with PCP within 3-5 days, have BMP repeated.       Physical Exam:  General appearance: no acute distress noted  Head: Normocephalic, without obvious abnormality, atraumatic  Lungs: clear to auscultation bilaterally  Heart: regular rate and rhythm, S1, S2 normal, no murmur, click, rub or gallop  Abdomen: soft, non tender, non distended. Normoactive bowel sounds. Extremities: 2+ edema RLE, 1+ edema LLE, no cyanosis, 1+ edema LUE  Skin: Skin color, texture, turgor normal. No rashes or lesions  Neurologic: A/O to self, follows simple commands, contracted LUE    Significant Diagnostic Studies: labs:   Recent Results (from the past 24 hour(s))   GLUCOSE, POC    Collection Time: 05/15/18  5:22 PM   Result Value Ref Range    Glucose (POC) 137 (H) 70 - 110 mg/dL   GLUCOSE, POC    Collection Time: 05/16/18 12:17 AM   Result Value Ref Range    Glucose (POC) 114 (H) 70 - 110 mg/dL   RENAL FUNCTION PANEL    Collection Time: 05/16/18  6:50 AM   Result Value Ref Range    Sodium 144 136 - 145 mmol/L    Potassium 3.5 3.5 - 5.5 mmol/L    Chloride 110 (H) 100 - 108 mmol/L    CO2 27 21 - 32 mmol/L    Anion gap 7 3.0 - 18 mmol/L    Glucose 66 (L) 74 - 99 mg/dL    BUN 25 (H) 7.0 - 18 MG/DL    Creatinine 0.76 0.6 - 1.3 MG/DL    BUN/Creatinine ratio 33 (H) 12 - 20      GFR est AA >60 >60 ml/min/1.73m2    GFR est non-AA >60 >60 ml/min/1.73m2    Calcium 7.5 (L) 8.5 - 10.1 MG/DL    Phosphorus 2.3 (L) 2.5 - 4.9 MG/DL    Albumin 1.4 (L) 3.4 - 5.0 g/dL   CBC WITH AUTOMATED DIFF    Collection Time: 05/16/18  6:50 AM   Result Value Ref Range    WBC 9.7 4.6 - 13.2 K/uL    RBC 4.17 (L) 4.70 - 5.50 M/uL    HGB 11.8 (L) 13.0 - 16.0 g/dL    HCT 35.4 (L) 36.0 - 48.0 %    MCV 84.9 74.0 - 97.0 FL    MCH 28.3 24.0 - 34.0 PG    MCHC 33.3 31.0 - 37.0 g/dL    RDW 16.4 (H) 11.6 - 14.5 %    PLATELET 031 168 - 038 K/uL    MPV 10.3 9.2 - 11.8 FL    NEUTROPHILS 70 40 - 73 %    LYMPHOCYTES 20 (L) 21 - 52 %    MONOCYTES 7 3 - 10 %    EOSINOPHILS 3 0 - 5 %    BASOPHILS 0 0 - 2 %    ABS. NEUTROPHILS 6.8 1.8 - 8.0 K/UL    ABS. LYMPHOCYTES 1.9 0.9 - 3.6 K/UL    ABS. MONOCYTES 0.7 0.05 - 1.2 K/UL    ABS. EOSINOPHILS 0.3 0.0 - 0.4 K/UL    ABS.  BASOPHILS 0.0 0.0 - 0.06 K/UL    DF AUTOMATED     GLUCOSE, POC    Collection Time: 05/16/18  6:57 AM Result Value Ref Range    Glucose (POC) 72 70 - 110 mg/dL   GLUCOSE, POC    Collection Time: 05/16/18  7:55 AM   Result Value Ref Range    Glucose (POC) 139 (H) 70 - 110 mg/dL   GLUCOSE, POC    Collection Time: 05/16/18 11:32 AM   Result Value Ref Range    Glucose (POC) 142 (H) 70 - 110 mg/dL         Discharge Medications:     Current Discharge Medication List      START taking these medications    Details   apixaban (ELIQUIS) 5 mg tablet Take 10 mg BID x 7 days then 5 mg BID  Qty: 28 Tab, Refills: 0      predniSONE (DELTASONE) 20 mg tablet Take 2 Tabs by mouth daily (with breakfast) for 5 days. Qty: 10 Tab, Refills: 0      amLODIPine (NORVASC) 10 mg tablet Take 1 Tab by mouth daily. Qty: 30 Tab, Refills: 0      aspirin (ASPIRIN) 325 mg tablet Take 1 Tab by mouth daily. Qty: 30 Tab, Refills: 0      collagenase (SANTYL) 250 unit/gram ointment Apply  to affected area daily. Qty: 15 g, Refills: 0             Activity: Activity as tolerated    Diet: PEG feeding, Osmolite 1 neno @75 ml/hr, free water flushes 200 ml Q4H    Wound Care: Wound Care/Dressing change to Left Lateral Ankle and Right Posterior Thigh:  Cleanse wound with normal saline or dermal wound cleanser,  cover with Mepilex, may secure with hypafix tape. Apply zinc oxide to Left Scrotum after skin cleansing, Please perform each shift and PRN. Follow-up: Follow up with PCP within 3-5 days, have BMP repeated to monitor sodium.       Discharge time: > 35 mins  Andrew Rollins NP  5/16/2018, 2:38 PM

## 2018-05-16 NOTE — DISCHARGE INSTRUCTIONS
DISCHARGE SUMMARY from Nurse    PATIENT INSTRUCTIONS:    After general anesthesia or intravenous sedation, for 24 hours or while taking prescription Narcotics:  · Limit your activities  · Do not drive and operate hazardous machinery  · Do not make important personal or business decisions  · Do  not drink alcoholic beverages  · If you have not urinated within 8 hours after discharge, please contact your surgeon on call. Report the following to your surgeon:  · Excessive pain, swelling, redness or odor of or around the surgical area  · Temperature over 100.5  · Nausea and vomiting lasting longer than 4 hours or if unable to take medications  · Any signs of decreased circulation or nerve impairment to extremity: change in color, persistent  numbness, tingling, coldness or increase pain  · Any questions    What to do at Home:  Recommended activity: Activity as tolerated,     If you experience any of the following symptoms as listed under \" When should I call for help? \" in your discharge teaching  , please follow up with your Doctor and/ or call 911. *  Please give a list of your current medications to your Primary Care Provider. *  Please update this list whenever your medications are discontinued, doses are      changed, or new medications (including over-the-counter products) are added. *  Please carry medication information at all times in case of emergency situations. These are general instructions for a healthy lifestyle:    No smoking/ No tobacco products/ Avoid exposure to second hand smoke  Surgeon General's Warning:  Quitting smoking now greatly reduces serious risk to your health.     Obesity, smoking, and sedentary lifestyle greatly increases your risk for illness    A healthy diet, regular physical exercise & weight monitoring are important for maintaining a healthy lifestyle    You may be retaining fluid if you have a history of heart failure or if you experience any of the following symptoms:  Weight gain of 3 pounds or more overnight or 5 pounds in a week, increased swelling in our hands or feet or shortness of breath while lying flat in bed. Please call your doctor as soon as you notice any of these symptoms; do not wait until your next office visit. Recognize signs and symptoms of STROKE:    F-face looks uneven    A-arms unable to move or move unevenly    S-speech slurred or non-existent    T-time-call 911 as soon as signs and symptoms begin-DO NOT go       Back to bed or wait to see if you get better-TIME IS BRAIN. Warning Signs of HEART ATTACK     Call 911 if you have these symptoms:   Chest discomfort. Most heart attacks involve discomfort in the center of the chest that lasts more than a few minutes, or that goes away and comes back. It can feel like uncomfortable pressure, squeezing, fullness, or pain.  Discomfort in other areas of the upper body. Symptoms can include pain or discomfort in one or both arms, the back, neck, jaw, or stomach.  Shortness of breath with or without chest discomfort.  Other signs may include breaking out in a cold sweat, nausea, or lightheadedness. Don't wait more than five minutes to call 911 - MINUTES MATTER! Fast action can save your life. Calling 911 is almost always the fastest way to get lifesaving treatment. Emergency Medical Services staff can begin treatment when they arrive -- up to an hour sooner than if someone gets to the hospital by car. The discharge information has been reviewed with the patient. The patient verbalize understanding but no signs of learning. Discharge medications reviewed with the patient and appropriate educational materials and side effects teaching were provided. Patient armband removed and shredded        Altered Mental Status: Care Instructions  Your Care Instructions  Altered mental status is a change in how well your brain is working.  As a result, you may be confused, be less alert than usual, or act in odd ways. This may include seeing or hearing things that aren't really there (hallucinations). A mental status change has many possible causes. For example, it may be the result of an infection, an imbalance of chemicals in the body, or a chronic disease such as diabetes or COPD. It can also be caused by things such as a head injury, taking certain medicines, or using alcohol or drugs. The doctor may do tests to look for the cause. These tests may include urine tests, blood tests, and imaging tests such as a CT scan. Sometimes a clear cause isn't found. But tests can help the doctor rule out a serious cause of your symptoms. A change in mental status can be scary. But mental status will often return to normal when the cause is treated. So it is important to get any follow-up testing or treatment the doctor has suggested. The doctor has checked you carefully, but problems can develop later. If you notice any problems or new symptoms, get medical treatment right away. Follow-up care is a key part of your treatment and safety. Be sure to make and go to all appointments, and call your doctor if you are having problems. It's also a good idea to know your test results and keep a list of the medicines you take. How can you care for yourself at home? · Be safe with medicines. Take your medicines exactly as prescribed. Call your doctor if you think you are having a problem with your medicine. · Have another adult stay with you until you are better. This can help keep you safe. Ask that person to watch for signs that your mental status is getting worse. When should you call for help? Call 911 anytime you think you may need emergency care. For example, call if:  · You passed out (lost consciousness). Call your doctor now or seek immediate medical care if:  · Your mental status is getting worse. · You have new symptoms, such as a fever, chills, or shortness of breath. · You do not feel safe.   Watch closely for changes in your health, and be sure to contact your doctor if:  · You do not get better as expected. Where can you learn more? Go to [email or web address]  Enter J452 in the search box to learn more about \"Altered Mental Status: Care Instructions. \"   © 6191-3262 Healthwise, Incorporated. Care instructions adapted under license by [employer] (which disclaims liability or warranty for this information). This care instruction is for use with your licensed healthcare professional. If you have questions about a medical condition or this instruction, always ask your healthcare professional. [address] any warranty or liability for your use of this information. Content Version: 80.5.678807; Current as of: November 20, 2015             Dehydration: Care Instructions  Your Care Instructions  Dehydration happens when your body loses too much fluid. This might happen when you do not drink enough water or you lose large amounts of fluids from your body because of diarrhea, vomiting, or sweating. Severe dehydration can be life-threatening. Water and minerals called electrolytes help put your body fluids back in balance. Learn the early signs of fluid loss, and drink more fluids to prevent dehydration. Follow-up care is a key part of your treatment and safety. Be sure to make and go to all appointments, and call your doctor if you are having problems. It's also a good idea to know your test results and keep a list of the medicines you take. How can you care for yourself at home? · To prevent dehydration, drink plenty of fluids, enough so that your urine is light yellow or clear like water. Choose water and other caffeine-free clear liquids until you feel better. If you have kidney, heart, or liver disease and have to limit fluids, talk with your doctor before you increase the amount of fluids you drink.   · If you do not feel like eating or drinking, try taking small sips of water, sports drinks, or other rehydration drinks. · Get plenty of rest.  To prevent dehydration  · Add more fluids to your diet and daily routine, unless your doctor has told you not to. · During hot weather, drink more fluids. Drink even more fluids if you exercise a lot. Stay away from drinks with alcohol or caffeine. · Watch for the symptoms of dehydration. These include:  ¨ A dry, sticky mouth. ¨ Dark yellow urine, and not much of it. ¨ Dry and sunken eyes. ¨ Feeling very tired. · Learn what problems can lead to dehydration. These include:  ¨ Diarrhea, fever, and vomiting. ¨ Any illness with a fever, such as pneumonia or the flu. ¨ Activities that cause heavy sweating, such as endurance races and heavy outdoor work in hot or humid weather. ¨ Alcohol or drug abuse or withdrawal.  ¨ Certain medicines, such as cold and allergy pills (antihistamines), diet pills (diuretics), and laxatives. ¨ Certain diseases, such as diabetes, cancer, and heart or kidney disease. When should you call for help? Call 911 anytime you think you may need emergency care. For example, call if:  ? · You passed out (lost consciousness). ?Call your doctor now or seek immediate medical care if:  ? · You are confused and cannot think clearly. ? · You are dizzy or lightheaded, or you feel like you may faint. ? · You have signs of needing more fluids. You have sunken eyes and a dry mouth, and you pass only a little dark urine. ? · You cannot keep fluids down. ? Watch closely for changes in your health, and be sure to contact your doctor if:  ? · You are not making tears. ? · Your skin is very dry and sags slowly back into place after you pinch it. ? · Your mouth and eyes are very dry. Where can you learn more? Go to http://roxy-paulino.info/. Enter U802 in the search box to learn more about \"Dehydration: Care Instructions. \"  Current as of: March 20, 2017  Content Version: 11.4  © 2246-0491 Healthwise, Incorporated. Care instructions adapted under license by [employer] (which disclaims liability or warranty for this information). If you have questions about a medical condition or this instruction, always ask your healthcare professional. [address] any warranty or liability for your use of this information. Electrolyte Imbalance: Care Instructions  Your Care Instructions  Electrolytes are minerals in your blood. They include sodium, potassium, calcium, and magnesium. When they are not at the right levels, you can feel very ill. You may not know what is causing it, but you know something is wrong. You may feel weak or numb, have muscle spasms, or twitch. Your heart may beat fast. Symptoms are different with each mineral. Too much is as bad as too little. Minerals help keep your body working as it should. Vomiting, diarrhea, and fever can cause you to lose minerals. A problem with your kidneys can tip a mineral out of balance. So can taking certain medicines. Your doctor may do more tests. He or she may change your medicine and diet. If you are low in one or more minerals, they may be given through a tube into your vein (IV). Your doctor may have you take or drink special fluids or pills. If your kidneys are failing, your blood may be filtered. This is called dialysis. It can put the minerals back in balance. Follow-up care is a key part of your treatment and safety. Be sure to make and go to all appointments, and call your doctor if you are having problems. It's also a good idea to know your test results and keep a list of the medicines you take. How can you care for yourself at home? · Take your medicines exactly as prescribed. Call your doctor if you have any problems with your medicines. You will get more details on the specific medicines your doctor prescribes.   · Do not take any medicine without talking to your doctor first. This includes prescription, over-the-counter, and herbal medicines. · If you have kidney, heart, or liver disease and have to limit fluids, talk with your doctor before you increase the amount of fluids you drink. · Your doctor or dietitian may give you a diet plan to help balance your minerals. Follow the diet carefully. When should you call for help? Call 911 anytime you think you may need emergency care. For example, call if:  ? · You passed out (lost consciousness). ? · Your heartbeat seems to be irregular. It might be speeding up and then slowing down or skipping beats. ?Call your doctor now or seek immediate medical care if:  ? · You have muscle aches. ? · You feel very weak. ? · You are confused or cannot think clearly. ? Watch closely for changes in your health, and be sure to contact your doctor if:  ? · You do not get better as expected. Where can you learn more? Go to http://roxyAsl [employer]yticalpaulino.info/. Enter I559 in the search box to learn more about \"Electrolyte Imbalance: Care Instructions. \"  Current as of: May 12, 2017  Content Version: 11.4  © 1386-9373 [employer]. Care instructions adapted under license by [employer] (which disclaims liability or warranty for this information). If you have questions about a medical condition or this instruction, always ask your healthcare professional. [address] any warranty or liability for your use of this information. Hyponatremia: Care Instructions  Your Care Instructions    Hyponatremia (say \"wo-bq-xan-TREE-ian-uh\") means that you don't have enough sodium in your blood. It can cause nausea, vomiting, and headaches. Or you may not feel hungry. In serious cases, it can cause seizures, a coma, or even death. Hyponatremia is not a disease. It is a problem caused by something else, such as medicines or exercising for a long time in hot weather.   You can get hyponatremia if you lose a lot of fluids and then you drink a lot of water or other liquids that don't have much sodium. You can also get it if you have kidney, liver, heart, or other health problems. Treatment is focused on getting your sodium levels back to normal.  Follow-up care is a key part of your treatment and safety. Be sure to make and go to all appointments, and call your doctor if you are having problems. It's also a good idea to know your test results and keep a list of the medicines you take. How can you care for yourself at home? · If your doctor recommends it, drink fluids that have sodium. Sports drinks are a good choice. Or you can eat salty foods. · If your doctor recommends it, limit the amount of water you drink. And limit fluids that are mostly water. These include tea, coffee, and juice. · Take your medicines exactly as prescribed. Call your doctor if you have any problems with your medicine. · Get your sodium levels tested when your doctor tells you to. When should you call for help? Call 911 anytime you think you may need emergency care. For example, call if:  ? · You have a seizure. ? · You passed out (lost consciousness). ?Call your doctor now or seek immediate medical care if:  ? · You are confused or it is hard to focus. ? · You have little or no appetite. ? · You feel sick to your stomach or you vomit. ? · You have a headache. ? · You have mood changes. ? · You feel more tired than usual.   ? Watch closely for changes in your health, and be sure to contact your doctor if:  ? · You do not get better as expected. Where can you learn more? Go to http://roxy-paulino.info/. Enter P770 in the search box to learn more about \"Hyponatremia: Care Instructions. \"  Current as of: October 14, 2016  Content Version: 11.4  © 3873-7260 Healthwise, Incorporated. Care instructions adapted under license by [employer] (which disclaims liability or warranty for this information).  If you have questions about a medical condition or this instruction, always ask your healthcare professional. [address] any warranty or liability for your use of this information. Learning About an Ischemic Stroke  What is an ischemic stroke? An ischemic (say \"wyt-ZDJ-xfja\") stroke occurs when a blood clot blocks a blood vessel in the brain. This means that blood cannot flow to some part of the brain. Without blood and the oxygen it carries, this part of the brain starts to die. The part of the body controlled by the damaged area of the brain cannot work properly. This is different from a hemorrhagic (say \"ogb-skd-VW-jick\") stroke, which happens when a blood vessel in the brain has burst open or has started to leak. The brain damage from a stroke starts within minutes. Quick treatment can help limit damage to the brain and make recovery more likely. People who have had a stroke may have a hard time talking, understanding things, and making decisions. They may have to relearn daily activities, such as how to eat, bathe, and dress. How well someone recovers from a stroke depends on how quickly the person gets to the hospital, where in the brain the stroke happened, and how severe it was. Training and therapy also make a difference in how well people recover. What are the symptoms? If you have any of these symptoms, call 911 or other emergency services right away:  · You have symptoms of a stroke. These may include:  ¨ Sudden numbness, tingling, weakness, or loss of movement in your face, arm, or leg, especially on only one side of your body. ¨ Sudden vision changes. ¨ Sudden trouble speaking. ¨ Sudden confusion or trouble understanding simple statements. ¨ Sudden problems with walking or balance. ¨ A sudden, severe headache that is different from past headaches. See your doctor if you have symptoms that seem like a stroke, even if they go away quickly.  You may have had a transient ischemic attack (TIA), sometimes called a mini-stroke. A TIA is a warning that a stroke may happen soon. Getting early treatment for a TIA can help prevent a stroke. What causes an ischemic stroke? An ischemic stroke is caused by a blood clot that blocks blood flow in the brain. The most common causes of blood clots include:  · Hardening of the arteries (atherosclerosis). This is caused by high blood pressure, diabetes, high cholesterol, or smoking. · Atrial fibrillation. How is ischemic stroke treated? You may have to take several medicines, depending on what caused your stroke. Ask your doctor if a stroke rehab program is right for you. Rehab increases your chances of getting back some of the abilities you lost.  How can you prevent another stroke? · Work with your doctor to treat any health problems you have. High blood pressure, high cholesterol, atrial fibrillation, and diabetes all raise your chances of having a stroke. · Be safe with medicines. Take your medicine exactly as prescribed. Call your doctor if you think you are having a problem with your medicine. · Have a healthy lifestyle. ¨ Do not smoke or allow others to smoke around you. If you need help quitting, talk to your doctor about stop-smoking programs and medicines. These can increase your chances of quitting for good. Smoking makes a stroke more likely. ¨ Limit alcohol to 2 drinks a day for men and 1 drink a day for women. ¨ Lose weight if you need to. A healthy weight will help you keep your heart and body healthy. ¨ Be active. Ask your doctor what type and level of activity is safe for you. ¨ Eat heart-healthy foods, like fruits, vegetables, and high-fiber foods. Follow-up care is a key part of your treatment and safety. Be sure to make and go to all appointments, and call your doctor if you are having problems. It's also a good idea to know your test results and keep a list of the medicines you take.   Where can you learn more? Go to http://roxy-paulino.info/. Enter D161 in the search box to learn more about \"Learning About an Ischemic Stroke. \"  Current as of: March 20, 2017  Content Version: 11.4  © 0808-5863 [employer]. Care instructions adapted under license by [employer] (which disclaims liability or warranty for this information). If you have questions about a medical condition or this instruction, always ask your healthcare professional. [place] [address] any warranty or liability for your use of this information. Acute Kidney Injury: Care Instructions  Your Care Instructions    Acute kidney injury (SIMIN) is a sudden decrease in kidney function. This can happen over a period of hours, days or, in some cases, weeks. SIMIN used to be called acute renal failure, but kidney failure doesn't always happen with SIMIN. Common causes of SIMIN are dehydration, blood loss, and medicines. When SIMIN happens, the kidneys have trouble removing waste and excess fluids from the body. The waste and fluids build up and become harmful. Kidney function may return to normal if the cause of SIMIN is treated quickly. Your chance of a full recovery depends on what caused the problem, how quickly the cause was treated, and what other medical problems you have. A machine may be used to help your kidneys remove waste and fluids for a short period of time. This is called dialysis. Follow-up care is a key part of your treatment and safety. Be sure to make and go to all appointments, and call your doctor if you are having problems. It's also a good idea to know your test results and keep a list of the medicines you take. How can you care for yourself at home? · Talk to your doctor about how much fluid you should drink. · Eat a balanced diet. Talk to your doctor or a dietitian about what type of diet may be best for you. You may need to limit sodium, potassium, and phosphorus.   · If you need dialysis, follow the instructions and schedule for dialysis that your doctor gives you. · Do not smoke. Smoking can make your condition worse. If you need help quitting, talk to your doctor about stop-smoking programs and medicines. These can increase your chances of quitting for good. · Do not drink alcohol. · Review all of your medicines with your doctor. Do not take any medicines, including nonsteroidal anti-inflammatory drugs (NSAIDs) such as ibuprofen (Advil, Motrin) or naproxen (Aleve), unless your doctor says it is safe for you to do so. · Make sure that anyone treating you for any health problem knows that you have had SIMIN. When should you call for help? Call 911 anytime you think you may need emergency care. For example, call if:  ? · You passed out (lost consciousness). ?Call your doctor now or seek immediate medical care if:  ? · You have new or worse nausea and vomiting. ? · You have much less urine than normal, or you have no urine. ? · You are feeling confused or cannot think clearly. ? · You have new or more blood in your urine. ? · You have new swelling. ? · You are dizzy or lightheaded, or feel like you may faint. ? Watch closely for changes in your health, and be sure to contact your doctor if:  ? · You do not get better as expected. Where can you learn more? Go to http://roxy-paulino.info/. Enter X449 in the search box to learn more about \"Acute Kidney Injury: Care Instructions. \"  Current as of: May 12, 2017  Content Version: 11.4  © 4398-6085 Healthwise, Incorporated. Care instructions adapted under license by [employer] (which disclaims liability or warranty for this information). If you have questions about a medical condition or this instruction, always ask your healthcare professional. [address] any warranty or liability for your use of this information.          Learning About Long-Term Care for Stroke  What is long-term care for stroke? Long-term care for stroke is care for people who are leaving the hospital after a stroke but who still need some medical care or other help while they work on getting better. Choosing the right type of long-term care is a very personal decision. It's important to look carefully at your options. You want to be sure that the level of care is right and that you will feel comfortable. Your doctor or other members of your medical team can help you find which type of long-term care would be best for you. What are the types of long-term care for stroke patients? Each type of care center offers a different level of care. These centers may have shared or private rooms. An assisted living center or residential care center:  · Has a range of services. These may include meals, cleaning, and laundry. And they may offer help with personal needs like bathing, grooming, and dressing. · Often includes oversight by a nurse. You may be able to get help with basic care, such as getting medicines. A skilled nursing center:  · Offers nursing care up to 24 hours a day. · Provides meals and laundry. · Provides help with dressing, bathing, using the toilet, and other daily tasks. · Offers rehab therapy. A long-term acute care hospital:  · Is for stroke patients who have special medical problems. This may include things like chronic pain or not being able to breathe on your own. Follow-up care is a key part of your treatment and safety. Be sure to make and go to all appointments, and call your doctor if you are having problems. It's also a good idea to know your test results and keep a list of the medicines you take. Where can you learn more? Go to http://roxy-paulino.info/. Enter Y884 in the search box to learn more about \"Learning About Long-Term Care for Stroke. \"  Current as of: March 20, 2017  Content Version: 11.4  © 1447-6317 Healthwise, Incorporated.  Care instructions adapted under license by [employer] (which disclaims liability or warranty for this information). If you have questions about a medical condition or this instruction, always ask your healthcare professional. [address] any warranty or liability for your use of this information.     ___________________________________________________________________________________________________________________________________

## 2018-05-16 NOTE — PROGRESS NOTES
[address]  pt care from [facility]. Pt in bed, alert and oriented x 1. Not in any form of distress. Denies pain. Frequent use items and call bell within reach. Verbalized understanding to call for assistance. Bed locked in lowest position. Dual skin assessment completed. 0400 - Bed bath and mouth care completed. Linen, gown, bed pad, Mepilex dressings changed. Santyl applied on the sacral pressure ulcer. Pt's upper lip bleeding. Suspected wound from his lower tooth puncturing it. 0825 - Bedside and Verbal shift change report given to Fatemeh Cantor RN (oncoming nurse) by Antonia Lozada RN (offgoing nurse). Report included the following information SBAR, Kardex, Intake/Output, MAR and Recent Results.
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
## 2018-05-16 NOTE — PROGRESS NOTES
Patient received in bed awake. Alert to self; reoriented to place, time and situation. Behavior indicators negative. No distress noted. Frequently use items within reach. Bed locked in low position. Call bell within reach and Patient verbalized understanding of use for assistance and needs. [phone]- Dr. Pranay Sosa to nursing station V.O. Received to remove FMS and sawant catheter to remain for Comfort Care (RBV).

## 2018-05-17 LAB
BACTERIA SPEC CULT: NORMAL
BACTERIA SPEC CULT: NORMAL
SERVICE CMNT-IMP: NORMAL
SERVICE CMNT-IMP: NORMAL

## 2018-05-21 ENCOUNTER — PATIENT OUTREACH (OUTPATIENT)
Dept: CASE MANAGEMENT | Age: 68
End: 2018-05-21

## 2018-05-23 ENCOUNTER — PATIENT OUTREACH (OUTPATIENT)
Dept: CASE MANAGEMENT | Age: 68
End: 2018-05-23

## 2018-05-23 RX ORDER — CEPHALEXIN 250 MG/5ML
POWDER, FOR SUSPENSION ORAL 4 TIMES DAILY
COMMUNITY

## 2018-05-23 NOTE — PROGRESS NOTES
NN contacted Ms. Aileen Lundy LPN at [facility]. Medication reconciliation performed, Nasim reports that patient is now being treated for pneumonia with oral suspension Keflex, also states t5hat she has reported patient's edema of bilateral lower extremities (right has edema up to thigh) to patient's SNF physician. Nasim reports that patient is now in LTC and Dr. George Rosenthal is the physician on record.      NN will resolve this episode

## 2020-09-03 NOTE — PROGRESS NOTES
Pharmacy Dosing Services: Vancomycin    Indication: BSI    Day of therapy: 4    Other Antimicrobials (Include dose, start day & day of therapy):  None    Previous Antimicrobials:  Piperacillin-Tazobactam 3.375 grams every 6 hours, 2018    Loading dose (date given): 1,750 mg  Current Maintenance dose: Dosing per level    Goal Vancomycin Level: 15-20 mcg/mL  (Trough 15-20 for most infections, 20 for meningitis/osteomyelitis, pre-HD level ~25)    Vancomycin Level (if drawn):   - 14.8 (~24 hours post-dose) - ACTION:  given 1250 mg  5/10 - 21.8 (~14 hours post dose) - ACTION: given 1000 mg   - 24 (~13 hours post-dose)  -23.9 (~14.5 hrs post dose)   - 21.2 (~15 hour post dose)    Significant Cultures:    Blood - Staph hominis    Renal function stable? (unstable defined as SCr increase of 0.5 mg/dL or > 50% increase from baseline, whichever is greater) (Y/N): N  (but significantly improving)    CAPD, Hemodialysis or Renal Replacement Therapy (Y/N): N     Recent Labs      18   0420  18   0330  18   0350   CREA  1.17  1.42*  2.14*   BUN  46*  58*  79*   WBC   --   12.1  11.3     Temp (24hrs), Av.8 °F (36.6 °C), Min:97 °F (36.1 °C), Max:98.1 °F (36.7 °C)    Creatinine Clearance (Creatinine Clearance (ml/min)): 66 ml/min    Regimen assessment:   - Renal function slowly improving     Maintenance dose: 1000 mg IV every 18 hours    Next scheduled level: 5/15 at 1430       Pharmacy will follow daily and adjust medications as appropriate for renal function and/or serum levels.     Thank you,  Albert Cast, PHARMD [phone]

## 2022-02-24 NOTE — ROUTINE PROCESS
Bedside and Verbal shift change report given to Fatemeh   (oncoming nurse) by Lawrence Olivares RN   (offgoing nurse). Report included the following information SBAR, Kardex, Intake/Output, MAR and Accordion. Mood disorder/Alcohol/Substance Use disorders

## 2025-01-29 NOTE — Clinical Note
Dl sent for clarification.           naproxen 500 MG Oral Tab  Take 1 tablet (500 mg total) by mouth 2 (two) times daily with meals. Dispense: 60 tablet, Refills: 0 ordered        12/10/2024 -- [id number] - [address], [phone], [phone] --  -- -- Report     Patient not taking. Reported on 1/23/2025  Edit  Cancel Reorder      Status[de-identified] Inpatient [101] Type of Bed: Intensive Care [6] Inpatient Hospitalization Certified Necessary for the Following Reasons: 3. Patient receiving treatment that can only be provided in an inpatient setting (further clarification in H&P documentation) Admitting Diagnosis: Encephalopathy [032528] Admitting Diagnosis: Respiratory failure ([facility] [id number] Admitting Physician: Job Faye Attending Physician: Job Faye Estimated Length of Stay: 2 Midnights Discharge Plan[de-identified] Home with Office Follow-up

## 2025-05-02 NOTE — PROGRESS NOTES
VENTILATOR Care plan    Problem: Ventilator Management  Goal: *Adequate oxygenation/ ventilation/ and extubation      Patient:        Lisa Fitzpatrick     76 y.o.   male     5/10/2018  9:36 AM  Patient Active Problem List   Diagnosis Code    Respiratory failure ([facility] J96.90    Encephalopathy G93.40    Dehydration E86.0    Lactic acidosis E87.2    Hypernatremia E87.0    SIMIN (acute kidney injury) ([facility] N17.9    CVA (cerebral vascular accident) ([facility] I63.9    Sepsis (HCC) A41.9    Elevated troponin R74.8    Postprocedural pneumothorax J95.811    Hyperglycemia R73.9    Elevated CK R74.8    Bacteremia R78.81       Encephalopathy  Respiratory failure (HCC)  Encephalopathy  Respiratory failure (HCC)  Hypernatremia  SIMIN (acute kidney injury) (HCC)  Dehydration  Lactic acidosis    Reason patient intubated: Maintain airway protection. Patient found unresponsive.     Ventilator day: 3     Ventilator settings: ACVC+, RATE 15, , PEEP 5, FiO2 30%     ETT Size/Placement: 7mm, 26 @ lip      Date:5/10/2018  Lab Results   Component Value Date/Time    PHI 7.364 05/10/2018 06:16 AM    PCO2I 32.0 (L) 05/10/2018 06:16 AM    PO2I 89 05/10/2018 06:16 AM    HCO3I 18.5 (L) 05/10/2018 06:16 AM    FIO2I 30 05/10/2018 06:16 AM       Chest X-ray:  Date:5/10/2018    Results from Hospital Encounter encounter on 05/08/18   XR CHEST PORT   Narrative Chest, single view    Indication: Respiratory failure, left pneumothorax with chest tube placement. Chest tube currently on waterseal.    Comparison: Several prior chest radiographs, most recently May 10, 2018    Findings:  Portable upright AP view of the chest was obtained. There is an  endotracheal tube which projects approximately 3.5 cm above the jerson. Left IJ  approach central venous catheter projects in similar position, tip over the  distal brachiocephalic vein. Left-sided chest tube in unchanged position. There  is a tiny left apical pneumothorax present.  Trace amount of subcutaneous  emphysema along the left lateral chest wall, stable. No focal pneumonic opacity  or pleural effusion. Cardiac size and mediastinal contours are stable. Impression IMPRESSION:  1. Endotracheal tube, left-sided central line, and left-sided chest tube in  position as above. 2. Tiny left apical pneumothorax.          Lab Test:  Date:5/10/2018  WBC:   Lab Results   Component Value Date/Time    WBC 10.3 05/10/2018 02:25 AM   HGB: Lab Results   Component Value Date/Time    HGB 9.1 (L) 05/10/2018 02:25 AM    PLTS: Lab Results   Component Value Date/Time    PLATELET 566 (L) 90/79/8289 02:25 AM       SaO2%/flow:   SpO2 Readings from Last 1 Encounters:   05/10/18 100%       Vital Signs:   Patient Vitals for the past 8 hrs:   Temp Pulse Resp BP SpO2   05/10/18 0816 - 76 17 - 100 %   05/10/18 0800 (!) 36.1 °F (2.3 °C) 86 20 118/57 100 %   05/10/18 0745 (!) 35.9 °F (2.2 °C) 81 17 140/59 -   05/10/18 0730 (!) 35.8 °F (2.1 °C) 84 18 153/60 97 %   05/10/18 0715 - 74 18 129/58 -   05/10/18 0713 - 74 16 - 100 %   05/10/18 0701 - - - - 100 %   05/10/18 0700 - 76 16 139/64 100 %   05/10/18 0645 - 72 16 136/60 99 %   05/10/18 0630 - 75 15 149/68 100 %   05/10/18 0623 - 76 16 - 100 %   05/10/18 0616 - - - - 100 %   05/10/18 0615 - 77 15 146/67 99 %   05/10/18 0600 - 78 22 149/80 100 %   05/10/18 0554 - 78 19 - 100 %   05/10/18 0545 - 77 21 139/82 (!) 86 %   05/10/18 0530 - 73 16 142/68 100 %   05/10/18 0515 - 72 17 128/64 100 %   05/10/18 0500 - 69 16 121/63 98 %   05/10/18 0445 - 69 15 127/61 100 %   05/10/18 0430 - 70 18 130/59 100 %   05/10/18 0415 - 74 14 128/61 (!) 56 %   05/10/18 0409 96.3 °F (35.7 °C) 72 17 127/61 100 %   05/10/18 0400 - 70 16 127/61 100 %   05/10/18 0345 - 68 18 120/58 100 %   05/10/18 0330 - 67 17 117/63 100 %   05/10/18 0315 - 68 16 115/58 100 %   05/10/18 0300 - 69 18 109/58 100 %   05/10/18 0245 - 71 16 125/56 100 %   05/10/18 0230 - 72 17 124/63 100 %   05/10/18 0215 - 72 18 128/62 100 % 05/10/18 0200 - 73 17 129/62 100 %   05/10/18 0145 - 74 18 123/68 100 %       Wean Screen Pass (Yes or No): Yes. Started @ 8186. Duration of Weaning Trial:    Additional Comments: initiated vent wean today. MD notified and aware. Patient seems more awake and alert today. ABG on SBT. PLAN OF CARE: Oxgenation/Ventilation/Maintain airway       GOAL: Extubation and vent liberation. OUTCOME: Tolerated SBT. Ended at 1400. [Alert] : alert [Normocephalic] : normocephalic [Flat Open Anterior [place]] : flat open anterior fontanelle [Red Reflex] : red reflex bilateral [PERRL] : PERRL [Normally Placed Ears] : normally placed ears [Auricles Well Formed] : auricles well formed [Clear Tympanic membranes] : clear tympanic membranes [Light reflex present] : light reflex present [Bony landmarks visible] : bony landmarks visible [Nares Patent] : nares patent [Palate Intact] : palate intact [Uvula Midline] : uvula midline [Tooth Eruption] : tooth eruption [Supple, full passive range of motion] : supple, full passive range of motion [Symmetric Chest Rise] : symmetric chest rise [Clear to Auscultation Bilaterally] : clear to auscultation bilaterally [Regular Rate and Rhythm] : regular rate and rhythm [S1, S2 present] : S1, S2 present [+2 Femoral Pulses] : (+) 2 femoral pulses [Soft] : soft [Bowel Sounds] : bowel sounds present [Normal External Genitalia] : normal external genitalia [Normal Vaginal Introitus] : normal vaginal introitus [No Abnormal Lymph Nodes Palpated] : no abnormal lymph nodes palpated [Symmetric abduction and rotation of hips] : symmetric abduction and rotation of hips [Straight] : straight [Cranial Nerves Grossly Intact] : cranial nerves grossly intact [Acute Distress] : no acute distress [Excessive Tearing] : no excessive tearing [Discharge] : no discharge [Palpable Masses] : no palpable masses [Murmurs] : no murmurs [Tender] : nontender [Distended] : nondistended [Hepatomegaly] : no hepatomegaly [Splenomegaly] : no splenomegaly [Clitoromegaly] : no clitoromegaly [Allis Sign] : negative Allis sign [Rash or Lesions] : no rash/lesions